# Patient Record
Sex: MALE | Race: WHITE | NOT HISPANIC OR LATINO | Employment: OTHER | URBAN - METROPOLITAN AREA
[De-identification: names, ages, dates, MRNs, and addresses within clinical notes are randomized per-mention and may not be internally consistent; named-entity substitution may affect disease eponyms.]

---

## 2017-06-20 ENCOUNTER — TRANSCRIBE ORDERS (OUTPATIENT)
Dept: ADMINISTRATIVE | Facility: HOSPITAL | Age: 62
End: 2017-06-20

## 2017-06-20 DIAGNOSIS — R68.89 EXERCISE INTOLERANCE: Primary | ICD-10-CM

## 2017-06-21 ENCOUNTER — LAB REQUISITION (OUTPATIENT)
Dept: LAB | Facility: HOSPITAL | Age: 62
End: 2017-06-21

## 2017-06-21 DIAGNOSIS — C43.59 MALIGNANT MELANOMA OF OTHER PART OF TRUNK (HCC): ICD-10-CM

## 2017-06-21 LAB — POTASSIUM BLDA-SCNC: 3.81 MMOL/L (ref 3.5–5.3)

## 2017-06-21 PROCEDURE — 88305 TISSUE EXAM BY PATHOLOGIST: CPT | Performed by: SURGERY

## 2017-06-21 PROCEDURE — 84132 ASSAY OF SERUM POTASSIUM: CPT | Performed by: SURGERY

## 2017-06-28 ENCOUNTER — APPOINTMENT (OUTPATIENT)
Dept: CARDIOLOGY | Facility: HOSPITAL | Age: 62
End: 2017-06-28

## 2017-06-29 ENCOUNTER — HOSPITAL ENCOUNTER (OUTPATIENT)
Dept: CARDIOLOGY | Facility: HOSPITAL | Age: 62
Discharge: HOME | End: 2017-06-29
Admitting: GENERAL PRACTICE

## 2017-06-29 VITALS
HEIGHT: 69 IN | HEART RATE: 85 BPM | WEIGHT: 198 LBS | BODY MASS INDEX: 29.33 KG/M2 | DIASTOLIC BLOOD PRESSURE: 80 MMHG | SYSTOLIC BLOOD PRESSURE: 158 MMHG | OXYGEN SATURATION: 97 %

## 2017-06-29 DIAGNOSIS — R68.89 EXERCISE INTOLERANCE: ICD-10-CM

## 2017-06-29 LAB
BH CV STRESS BP STAGE 1: NORMAL
BH CV STRESS DURATION MIN STAGE 1: 3
BH CV STRESS DURATION MIN STAGE 2: 2
BH CV STRESS DURATION SEC STAGE 1: 0
BH CV STRESS DURATION SEC STAGE 2: 0
BH CV STRESS GRADE STAGE 1: 10
BH CV STRESS GRADE STAGE 2: 12
BH CV STRESS HR STAGE 1: 121
BH CV STRESS HR STAGE 2: 137
BH CV STRESS METS STAGE 1: 5
BH CV STRESS METS STAGE 2: 7.5
BH CV STRESS O2 STAGE 1: 97
BH CV STRESS O2 STAGE 2: 97
BH CV STRESS PROTOCOL 1: NORMAL
BH CV STRESS RECOVERY BP: NORMAL MMHG
BH CV STRESS RECOVERY HR: 97 BPM
BH CV STRESS RECOVERY O2: 97 %
BH CV STRESS SPEED STAGE 1: 1.7
BH CV STRESS SPEED STAGE 2: 2.5
BH CV STRESS STAGE 1: 1
BH CV STRESS STAGE 2: 2
MAXIMAL PREDICTED HEART RATE: 160 BPM
PERCENT MAX PREDICTED HR: 85.63 %
STRESS BASELINE BP: NORMAL MMHG
STRESS BASELINE HR: 85 BPM
STRESS O2 SAT REST: 97 %
STRESS PERCENT HR: 101 %
STRESS POST ESTIMATED WORKLOAD: 7 METS
STRESS POST EXERCISE DUR MIN: 5 MIN
STRESS POST EXERCISE DUR SEC: 0 SEC
STRESS POST O2 SAT PEAK: 97 %
STRESS POST PEAK BP: NORMAL MMHG
STRESS POST PEAK HR: 137 BPM
STRESS TARGET HR: 136 BPM

## 2017-06-29 PROCEDURE — 93018 CV STRESS TEST I&R ONLY: CPT | Performed by: INTERNAL MEDICINE

## 2017-06-29 PROCEDURE — 93017 CV STRESS TEST TRACING ONLY: CPT

## 2017-06-29 RX ORDER — FLUOXETINE HYDROCHLORIDE 20 MG/1
20 CAPSULE ORAL DAILY
COMMUNITY
End: 2020-05-01

## 2017-06-29 RX ORDER — OXYCODONE AND ACETAMINOPHEN 10; 325 MG/1; MG/1
1 TABLET ORAL EVERY 6 HOURS PRN
COMMUNITY
End: 2017-10-02

## 2017-06-29 RX ORDER — DOXEPIN HYDROCHLORIDE 100 MG/1
150 CAPSULE ORAL NIGHTLY
COMMUNITY
End: 2017-07-12

## 2017-06-29 RX ORDER — GABAPENTIN 600 MG/1
600 TABLET ORAL 3 TIMES DAILY
COMMUNITY
End: 2020-05-01

## 2017-06-29 RX ORDER — TRIAMTERENE CAPSULES 50 MG/1
50 CAPSULE ORAL 2 TIMES DAILY
COMMUNITY
End: 2017-07-12

## 2017-06-29 RX ORDER — TIZANIDINE 4 MG/1
4 TABLET ORAL 3 TIMES DAILY
COMMUNITY
End: 2020-05-01

## 2017-06-30 LAB
CYTO UR: NORMAL
LAB AP CASE REPORT: NORMAL
LAB AP CLINICAL INFORMATION: NORMAL
LAB AP DIAGNOSIS COMMENT: NORMAL
Lab: NORMAL
PATH REPORT.FINAL DX SPEC: NORMAL
PATH REPORT.GROSS SPEC: NORMAL

## 2017-07-12 ENCOUNTER — CONSULT (OUTPATIENT)
Dept: CARDIOLOGY | Facility: CLINIC | Age: 62
End: 2017-07-12

## 2017-07-12 VITALS
WEIGHT: 199.6 LBS | BODY MASS INDEX: 29.56 KG/M2 | DIASTOLIC BLOOD PRESSURE: 90 MMHG | SYSTOLIC BLOOD PRESSURE: 140 MMHG | HEART RATE: 72 BPM | HEIGHT: 69 IN

## 2017-07-12 DIAGNOSIS — R00.2 PALPITATIONS: ICD-10-CM

## 2017-07-12 DIAGNOSIS — R55 SYNCOPE, UNSPECIFIED SYNCOPE TYPE: Primary | ICD-10-CM

## 2017-07-12 DIAGNOSIS — Z72.0 NICOTINE ABUSE: ICD-10-CM

## 2017-07-12 PROCEDURE — 99214 OFFICE O/P EST MOD 30 MIN: CPT | Performed by: INTERNAL MEDICINE

## 2017-07-12 PROCEDURE — 93000 ELECTROCARDIOGRAM COMPLETE: CPT | Performed by: INTERNAL MEDICINE

## 2017-07-12 RX ORDER — TRIAMTERENE AND HYDROCHLOROTHIAZIDE 37.5; 25 MG/1; MG/1
TABLET ORAL AS NEEDED
COMMUNITY
Start: 2017-06-28 | End: 2018-01-23 | Stop reason: SDUPTHER

## 2017-07-12 RX ORDER — TEMAZEPAM 30 MG/1
CAPSULE ORAL DAILY
COMMUNITY
Start: 2017-06-20 | End: 2020-05-01

## 2017-07-12 RX ORDER — DOXEPIN HYDROCHLORIDE 150 MG/1
CAPSULE ORAL DAILY
COMMUNITY
Start: 2017-05-04 | End: 2020-03-17

## 2017-07-12 RX ORDER — FLUTICASONE PROPIONATE 50 MCG
SPRAY, SUSPENSION (ML) NASAL AS NEEDED
COMMUNITY
Start: 2017-04-23 | End: 2020-05-01

## 2017-07-12 RX ORDER — BETAMETHASONE DIPROPIONATE 0.5 MG/ML
LOTION, AUGMENTED TOPICAL 2 TIMES DAILY PRN
COMMUNITY
Start: 2017-07-01 | End: 2020-05-01
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
## 2017-08-15 DIAGNOSIS — R55 SYNCOPE, UNSPECIFIED SYNCOPE TYPE: Primary | ICD-10-CM

## 2017-10-02 ENCOUNTER — OFFICE VISIT (OUTPATIENT)
Dept: CARDIOLOGY | Facility: CLINIC | Age: 62
End: 2017-10-02

## 2017-10-02 VITALS
BODY MASS INDEX: 28.85 KG/M2 | HEIGHT: 68 IN | SYSTOLIC BLOOD PRESSURE: 116 MMHG | HEART RATE: 85 BPM | WEIGHT: 190.4 LBS | DIASTOLIC BLOOD PRESSURE: 78 MMHG

## 2017-10-02 DIAGNOSIS — I47.1 SVT (SUPRAVENTRICULAR TACHYCARDIA) (HCC): ICD-10-CM

## 2017-10-02 DIAGNOSIS — R55 SYNCOPE, UNSPECIFIED SYNCOPE TYPE: Primary | ICD-10-CM

## 2017-10-02 DIAGNOSIS — Z72.0 NICOTINE ABUSE: ICD-10-CM

## 2017-10-02 PROBLEM — I47.10 SVT (SUPRAVENTRICULAR TACHYCARDIA): Status: ACTIVE | Noted: 2017-10-02

## 2017-10-02 PROCEDURE — 93000 ELECTROCARDIOGRAM COMPLETE: CPT | Performed by: INTERNAL MEDICINE

## 2017-10-02 PROCEDURE — 99243 OFF/OP CNSLTJ NEW/EST LOW 30: CPT | Performed by: INTERNAL MEDICINE

## 2017-10-02 RX ORDER — FLECAINIDE ACETATE 50 MG/1
50 TABLET ORAL 2 TIMES DAILY
Qty: 60 TABLET | Refills: 6 | Status: SHIPPED | OUTPATIENT
Start: 2017-10-02 | End: 2017-11-27 | Stop reason: SDUPTHER

## 2017-10-02 RX ORDER — HYDROCODONE BITARTRATE AND ACETAMINOPHEN 10; 325 MG/1; MG/1
0.5 TABLET ORAL EVERY 6 HOURS PRN
COMMUNITY
End: 2020-05-01
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
## 2017-10-04 ENCOUNTER — CLINICAL SUPPORT (OUTPATIENT)
Dept: CARDIOLOGY | Facility: CLINIC | Age: 62
End: 2017-10-04

## 2017-10-04 DIAGNOSIS — R55 SYNCOPE, UNSPECIFIED SYNCOPE TYPE: Primary | ICD-10-CM

## 2017-10-04 PROCEDURE — 93000 ELECTROCARDIOGRAM COMPLETE: CPT | Performed by: INTERNAL MEDICINE

## 2017-11-27 RX ORDER — FLECAINIDE ACETATE 50 MG/1
50 TABLET ORAL 2 TIMES DAILY
Qty: 60 TABLET | Refills: 6 | Status: SHIPPED | OUTPATIENT
Start: 2017-11-27 | End: 2018-01-08 | Stop reason: SDUPTHER

## 2018-01-08 RX ORDER — FLECAINIDE ACETATE 50 MG/1
50 TABLET ORAL EVERY 12 HOURS SCHEDULED
Qty: 60 TABLET | Refills: 6 | Status: SHIPPED | OUTPATIENT
Start: 2018-01-08 | End: 2018-01-23 | Stop reason: SDUPTHER

## 2018-01-23 RX ORDER — FLECAINIDE ACETATE 50 MG/1
50 TABLET ORAL EVERY 12 HOURS SCHEDULED
Qty: 60 TABLET | Refills: 6 | Status: SHIPPED | OUTPATIENT
Start: 2018-01-23 | End: 2018-12-11 | Stop reason: SDUPTHER

## 2018-01-23 RX ORDER — TRIAMTERENE AND HYDROCHLOROTHIAZIDE 37.5; 25 MG/1; MG/1
1 TABLET ORAL AS NEEDED
Qty: 30 TABLET | Refills: 6 | Status: SHIPPED | OUTPATIENT
Start: 2018-01-23 | End: 2021-12-16 | Stop reason: SDUPTHER

## 2018-12-11 RX ORDER — FLECAINIDE ACETATE 50 MG/1
50 TABLET ORAL EVERY 12 HOURS SCHEDULED
Qty: 60 TABLET | Refills: 6 | Status: SHIPPED | OUTPATIENT
Start: 2018-12-11 | End: 2019-01-10 | Stop reason: SDUPTHER

## 2019-01-10 RX ORDER — FLECAINIDE ACETATE 50 MG/1
50 TABLET ORAL EVERY 12 HOURS SCHEDULED
Qty: 60 TABLET | Refills: 6 | Status: SHIPPED | OUTPATIENT
Start: 2019-01-10 | End: 2020-05-01

## 2020-03-16 ENCOUNTER — TELEPHONE (OUTPATIENT)
Dept: CARDIOLOGY | Facility: CLINIC | Age: 65
End: 2020-03-16

## 2020-03-16 NOTE — TELEPHONE ENCOUNTER
Patient called requesting refills on Metoprolol and Flecainide. He has not been seen since 2017 by . I told him that he would have to schedule an appointment for refills.

## 2020-03-17 ENCOUNTER — HOSPITAL ENCOUNTER (EMERGENCY)
Facility: HOSPITAL | Age: 65
Discharge: HOME | End: 2020-03-17
Attending: EMERGENCY MEDICINE | Admitting: EMERGENCY MEDICINE

## 2020-03-17 ENCOUNTER — APPOINTMENT (OUTPATIENT)
Dept: GENERAL RADIOLOGY | Facility: HOSPITAL | Age: 65
End: 2020-03-17

## 2020-03-17 VITALS
TEMPERATURE: 98.1 F | DIASTOLIC BLOOD PRESSURE: 87 MMHG | BODY MASS INDEX: 19.99 KG/M2 | SYSTOLIC BLOOD PRESSURE: 139 MMHG | HEIGHT: 69 IN | WEIGHT: 135 LBS | RESPIRATION RATE: 18 BRPM | HEART RATE: 85 BPM | OXYGEN SATURATION: 97 %

## 2020-03-17 DIAGNOSIS — R00.2 PALPITATIONS: Primary | ICD-10-CM

## 2020-03-17 DIAGNOSIS — Z76.0 ENCOUNTER FOR MEDICATION REFILL: ICD-10-CM

## 2020-03-17 DIAGNOSIS — F41.0 ANXIETY ATTACK: ICD-10-CM

## 2020-03-17 DIAGNOSIS — I47.1 SVT (SUPRAVENTRICULAR TACHYCARDIA) (HCC): ICD-10-CM

## 2020-03-17 LAB
ALBUMIN SERPL-MCNC: 4.7 G/DL (ref 3.5–5.2)
ALBUMIN/GLOB SERPL: 1.3 G/DL
ALP SERPL-CCNC: 97 U/L (ref 39–117)
ALT SERPL W P-5'-P-CCNC: 30 U/L (ref 1–41)
ANION GAP SERPL CALCULATED.3IONS-SCNC: 14 MMOL/L (ref 5–15)
AST SERPL-CCNC: 44 U/L (ref 1–40)
BASOPHILS # BLD AUTO: 0.13 10*3/MM3 (ref 0–0.2)
BASOPHILS NFR BLD AUTO: 1.3 % (ref 0–1.5)
BILIRUB SERPL-MCNC: 0.3 MG/DL (ref 0.2–1.2)
BUN BLD-MCNC: 7 MG/DL (ref 8–23)
BUN/CREAT SERPL: 9 (ref 7–25)
CALCIUM SPEC-SCNC: 9.1 MG/DL (ref 8.6–10.5)
CHLORIDE SERPL-SCNC: 99 MMOL/L (ref 98–107)
CO2 SERPL-SCNC: 24 MMOL/L (ref 22–29)
CREAT BLD-MCNC: 0.78 MG/DL (ref 0.76–1.27)
DEPRECATED RDW RBC AUTO: 45.5 FL (ref 37–54)
EOSINOPHIL # BLD AUTO: 0.23 10*3/MM3 (ref 0–0.4)
EOSINOPHIL NFR BLD AUTO: 2.3 % (ref 0.3–6.2)
ERYTHROCYTE [DISTWIDTH] IN BLOOD BY AUTOMATED COUNT: 12.7 % (ref 12.3–15.4)
GFR SERPL CREATININE-BSD FRML MDRD: 101 ML/MIN/1.73
GLOBULIN UR ELPH-MCNC: 3.6 GM/DL
GLUCOSE BLD-MCNC: 144 MG/DL (ref 65–99)
HCT VFR BLD AUTO: 46.9 % (ref 37.5–51)
HGB BLD-MCNC: 15.7 G/DL (ref 13–17.7)
HOLD SPECIMEN: NORMAL
HOLD SPECIMEN: NORMAL
IMM GRANULOCYTES # BLD AUTO: 0.04 10*3/MM3 (ref 0–0.05)
IMM GRANULOCYTES NFR BLD AUTO: 0.4 % (ref 0–0.5)
LYMPHOCYTES # BLD AUTO: 2.84 10*3/MM3 (ref 0.7–3.1)
LYMPHOCYTES NFR BLD AUTO: 28.1 % (ref 19.6–45.3)
MAGNESIUM SERPL-MCNC: 2 MG/DL (ref 1.6–2.4)
MCH RBC QN AUTO: 32.8 PG (ref 26.6–33)
MCHC RBC AUTO-ENTMCNC: 33.5 G/DL (ref 31.5–35.7)
MCV RBC AUTO: 97.9 FL (ref 79–97)
MONOCYTES # BLD AUTO: 0.89 10*3/MM3 (ref 0.1–0.9)
MONOCYTES NFR BLD AUTO: 8.8 % (ref 5–12)
NEUTROPHILS # BLD AUTO: 5.99 10*3/MM3 (ref 1.7–7)
NEUTROPHILS NFR BLD AUTO: 59.1 % (ref 42.7–76)
NRBC BLD AUTO-RTO: 0 /100 WBC (ref 0–0.2)
NT-PROBNP SERPL-MCNC: 172.5 PG/ML (ref 5–900)
PLATELET # BLD AUTO: 227 10*3/MM3 (ref 140–450)
PMV BLD AUTO: 9.9 FL (ref 6–12)
POTASSIUM BLD-SCNC: 3.4 MMOL/L (ref 3.5–5.2)
PROT SERPL-MCNC: 8.3 G/DL (ref 6–8.5)
RBC # BLD AUTO: 4.79 10*6/MM3 (ref 4.14–5.8)
SODIUM BLD-SCNC: 137 MMOL/L (ref 136–145)
TROPONIN T SERPL-MCNC: <0.01 NG/ML (ref 0–0.03)
TSH SERPL DL<=0.05 MIU/L-ACNC: 0.32 UIU/ML (ref 0.27–4.2)
WBC NRBC COR # BLD: 10.12 10*3/MM3 (ref 3.4–10.8)
WHOLE BLOOD HOLD SPECIMEN: NORMAL
WHOLE BLOOD HOLD SPECIMEN: NORMAL

## 2020-03-17 PROCEDURE — 80053 COMPREHEN METABOLIC PANEL: CPT | Performed by: EMERGENCY MEDICINE

## 2020-03-17 PROCEDURE — 83880 ASSAY OF NATRIURETIC PEPTIDE: CPT | Performed by: EMERGENCY MEDICINE

## 2020-03-17 PROCEDURE — 84484 ASSAY OF TROPONIN QUANT: CPT | Performed by: EMERGENCY MEDICINE

## 2020-03-17 PROCEDURE — 84443 ASSAY THYROID STIM HORMONE: CPT | Performed by: EMERGENCY MEDICINE

## 2020-03-17 PROCEDURE — 83735 ASSAY OF MAGNESIUM: CPT | Performed by: EMERGENCY MEDICINE

## 2020-03-17 PROCEDURE — 93005 ELECTROCARDIOGRAM TRACING: CPT

## 2020-03-17 PROCEDURE — 71045 X-RAY EXAM CHEST 1 VIEW: CPT

## 2020-03-17 PROCEDURE — 93005 ELECTROCARDIOGRAM TRACING: CPT | Performed by: EMERGENCY MEDICINE

## 2020-03-17 PROCEDURE — 99284 EMERGENCY DEPT VISIT MOD MDM: CPT

## 2020-03-17 PROCEDURE — 85025 COMPLETE CBC W/AUTO DIFF WBC: CPT | Performed by: EMERGENCY MEDICINE

## 2020-03-17 RX ORDER — SODIUM CHLORIDE 0.9 % (FLUSH) 0.9 %
10 SYRINGE (ML) INJECTION AS NEEDED
Status: DISCONTINUED | OUTPATIENT
Start: 2020-03-17 | End: 2020-03-17 | Stop reason: HOSPADM

## 2020-03-17 RX ORDER — HYDROXYZINE PAMOATE 50 MG/1
50 CAPSULE ORAL NIGHTLY PRN
Qty: 30 CAPSULE | Refills: 0 | Status: SHIPPED | OUTPATIENT
Start: 2020-03-17 | End: 2020-05-01

## 2020-03-17 NOTE — ED PROVIDER NOTES
"Subjective   Stepan Gaines is a 64 y.o.male who presents to the emergency department with complaints of rapid heart rate. The patient confirms that he has SVT (\"2-3 times a week since ") and multiple anxiety attacks (not diagnosed with anxiety attacks). His accompanying symptoms include sleep disturbance and appetite change. He denies shortness of breath or cough. The patient notes that his wife  in February and has \"been down\" since then. He has stopped taking his hypertension medications because \"I stopped going to the office\". He usually takes Flecainide and Metoprolol. He additionally notes that he has a history of taking Doxepin. The patient denies a history of coronary artery disease, but confirms a history of COPD. He notes that he was smoking one pack of cigarettes a day, but has cut down to two cigarettes a day. In addition to the hypertension and COPD, his past medical history includes melanoma and arthritis. His past surgical history includes arthroscopy of right knee, left elbow, and right shoulder. He does smoke and does not drink alcohol. The patient does not have any additional acute complaints at this time.      History provided by:  Patient  Palpitations   Palpitations quality:  Fast  Onset quality:  Sudden  Duration: \"This morning\"  Timing:  Constant  Progression:  Unchanged  Chronicity:  Recurrent  Context: anxiety    Associated symptoms: no cough and no shortness of breath        Review of Systems   Constitutional: Positive for appetite change.   Respiratory: Negative for cough and shortness of breath.    Cardiovascular: Positive for palpitations.   Psychiatric/Behavioral: Positive for sleep disturbance. The patient is nervous/anxious.    All other systems reviewed and are negative.      Past Medical History:   Diagnosis Date   • Anxiety    • Arthritis    • Cancer (CMS/HCC)     melanoma back   • COPD (chronic obstructive pulmonary disease) (CMS/HCC)    • Fainting    • " Hypertension        No Known Allergies    Past Surgical History:   Procedure Laterality Date   • CATARACT EXTRACTION      right eye   • ELBOW ARTHROSCOPY Left     left elbow twice   • KNEE ARTHROSCOPY Right    • SHOULDER ARTHROSCOPY Right     left and right but had left shoulder surgery twice   • TOE ARTHROPLASTY Right        Family History   Problem Relation Age of Onset   • Hyperlipidemia Mother    • Lung cancer Father    • Diabetes Father    • Kidney failure Father        Social History     Socioeconomic History   • Marital status:      Spouse name: Not on file   • Number of children: Not on file   • Years of education: Not on file   • Highest education level: Not on file   Tobacco Use   • Smoking status: Current Every Day Smoker     Packs/day: 1.00   • Smokeless tobacco: Never Used   Substance and Sexual Activity   • Alcohol use: No   • Drug use: No   • Sexual activity: Defer         Objective   Physical Exam   Constitutional: He is oriented to person, place, and time. He appears well-developed and well-nourished. No distress.   HENT:   Head: Normocephalic and atraumatic.   Eyes: Conjunctivae are normal. No scleral icterus.   Neck: Normal range of motion. Neck supple.   Cardiovascular: Regular rhythm and normal heart sounds. Tachycardia present.   No murmur heard.  Heart rate is borderline tachycardic. (90s)   Pulmonary/Chest: Effort normal and breath sounds normal. No respiratory distress.   Abdominal: Soft. Bowel sounds are normal. There is no tenderness. There is no rebound and no guarding.   Musculoskeletal: Normal range of motion. He exhibits no edema.   Neurological: He is alert and oriented to person, place, and time.   Skin: Skin is warm and dry.   Psychiatric: He has a normal mood and affect. His behavior is normal.   Nursing note and vitals reviewed.      Procedures         ED Course     Recent Results (from the past 24 hour(s))   Comprehensive Metabolic Panel    Collection Time: 03/17/20 11:21  AM   Result Value Ref Range    Glucose 144 (H) 65 - 99 mg/dL    BUN 7 (L) 8 - 23 mg/dL    Creatinine 0.78 0.76 - 1.27 mg/dL    Sodium 137 136 - 145 mmol/L    Potassium 3.4 (L) 3.5 - 5.2 mmol/L    Chloride 99 98 - 107 mmol/L    CO2 24.0 22.0 - 29.0 mmol/L    Calcium 9.1 8.6 - 10.5 mg/dL    Total Protein 8.3 6.0 - 8.5 g/dL    Albumin 4.70 3.50 - 5.20 g/dL    ALT (SGPT) 30 1 - 41 U/L    AST (SGOT) 44 (H) 1 - 40 U/L    Alkaline Phosphatase 97 39 - 117 U/L    Total Bilirubin 0.3 0.2 - 1.2 mg/dL    eGFR Non African Amer 101 >60 mL/min/1.73    Globulin 3.6 gm/dL    A/G Ratio 1.3 g/dL    BUN/Creatinine Ratio 9.0 7.0 - 25.0    Anion Gap 14.0 5.0 - 15.0 mmol/L   Magnesium    Collection Time: 03/17/20 11:21 AM   Result Value Ref Range    Magnesium 2.0 1.6 - 2.4 mg/dL   Troponin    Collection Time: 03/17/20 11:21 AM   Result Value Ref Range    Troponin T <0.010 0.000 - 0.030 ng/mL   TSH    Collection Time: 03/17/20 11:21 AM   Result Value Ref Range    TSH 0.315 0.270 - 4.200 uIU/mL   BNP    Collection Time: 03/17/20 11:21 AM   Result Value Ref Range    proBNP 172.5 5.0 - 900.0 pg/mL   Light Blue Top    Collection Time: 03/17/20 11:21 AM   Result Value Ref Range    Extra Tube hold for add-on    Green Top (Gel)    Collection Time: 03/17/20 11:21 AM   Result Value Ref Range    Extra Tube Hold for add-ons.    Lavender Top    Collection Time: 03/17/20 11:21 AM   Result Value Ref Range    Extra Tube hold for add-on    Gold Top - SST    Collection Time: 03/17/20 11:21 AM   Result Value Ref Range    Extra Tube Hold for add-ons.    CBC Auto Differential    Collection Time: 03/17/20 11:21 AM   Result Value Ref Range    WBC 10.12 3.40 - 10.80 10*3/mm3    RBC 4.79 4.14 - 5.80 10*6/mm3    Hemoglobin 15.7 13.0 - 17.7 g/dL    Hematocrit 46.9 37.5 - 51.0 %    MCV 97.9 (H) 79.0 - 97.0 fL    MCH 32.8 26.6 - 33.0 pg    MCHC 33.5 31.5 - 35.7 g/dL    RDW 12.7 12.3 - 15.4 %    RDW-SD 45.5 37.0 - 54.0 fl    MPV 9.9 6.0 - 12.0 fL    Platelets 227 140  "- 450 10*3/mm3    Neutrophil % 59.1 42.7 - 76.0 %    Lymphocyte % 28.1 19.6 - 45.3 %    Monocyte % 8.8 5.0 - 12.0 %    Eosinophil % 2.3 0.3 - 6.2 %    Basophil % 1.3 0.0 - 1.5 %    Immature Grans % 0.4 0.0 - 0.5 %    Neutrophils, Absolute 5.99 1.70 - 7.00 10*3/mm3    Lymphocytes, Absolute 2.84 0.70 - 3.10 10*3/mm3    Monocytes, Absolute 0.89 0.10 - 0.90 10*3/mm3    Eosinophils, Absolute 0.23 0.00 - 0.40 10*3/mm3    Basophils, Absolute 0.13 0.00 - 0.20 10*3/mm3    Immature Grans, Absolute 0.04 0.00 - 0.05 10*3/mm3    nRBC 0.0 0.0 - 0.2 /100 WBC     Note: In addition to lab results from this visit, the labs listed above may include labs taken at another facility or during a different encounter within the last 24 hours. Please correlate lab times with ED admission and discharge times for further clarification of the services performed during this visit.    XR Chest 1 View   Final Result   Chronic changes seen within the lung fields bilaterally with   no evidence of acute parenchymal disease.       D:  03/17/2020   E:  03/17/2020       This report was finalized on 3/17/2020 12:33 PM by Dr. Sobia Villavicencio MD.            Vitals:    03/17/20 1118 03/17/20 1202 03/17/20 1203 03/17/20 1230   BP: 132/92 150/93  139/87   Pulse:   94 85   Resp:       Temp:       SpO2:   93% 97%   Weight: 61.2 kg (135 lb)      Height: 175.3 cm (69\")        Medications - No data to display  ECG/EMG Results (last 24 hours)     ** No results found for the last 24 hours. **        ECG 12 Lead    (Results Pending)                                              MDM  Number of Diagnoses or Management Options  Anxiety attack: new and requires workup  Encounter for medication refill: new and requires workup  Palpitations: new and requires workup  SVT (supraventricular tachycardia) (CMS/HCC): new and requires workup     Amount and/or Complexity of Data Reviewed  Clinical lab tests: reviewed and ordered  Tests in the radiology section of CPT®: reviewed " and ordered  Tests in the medicine section of CPT®: reviewed and ordered  Discuss the patient with other providers: yes    Patient Progress  Patient progress: stable      Final diagnoses:   Palpitations   SVT (supraventricular tachycardia) (CMS/HCC)   Anxiety attack   Encounter for medication refill       Documentation assistance provided by nimesh Grant.  Information recorded by the nimesh was done at my direction and has been verified and validated by me.     Hugo Grant  03/17/20 4505       Tanvir Rios PA  03/17/20 0130

## 2020-03-18 ENCOUNTER — TELEPHONE (OUTPATIENT)
Dept: CARDIOLOGY | Facility: HOSPITAL | Age: 65
End: 2020-03-18

## 2020-03-18 NOTE — TELEPHONE ENCOUNTER
Patient was referred to Heart and Vascular by [facility]. I have attempt to contact the patient 3 time with no success.Paatient was sent a letter to call the office to schedule.

## 2020-05-01 ENCOUNTER — TELEMEDICINE (OUTPATIENT)
Dept: CARDIOLOGY | Facility: HOSPITAL | Age: 65
End: 2020-05-01

## 2020-05-01 VITALS
BODY MASS INDEX: 20.73 KG/M2 | HEART RATE: 90 BPM | SYSTOLIC BLOOD PRESSURE: 122 MMHG | OXYGEN SATURATION: 97 % | DIASTOLIC BLOOD PRESSURE: 78 MMHG | WEIGHT: 140 LBS | HEIGHT: 69 IN

## 2020-05-01 DIAGNOSIS — I10 ESSENTIAL HYPERTENSION: ICD-10-CM

## 2020-05-01 DIAGNOSIS — I47.1 SVT (SUPRAVENTRICULAR TACHYCARDIA) (HCC): Primary | ICD-10-CM

## 2020-05-01 DIAGNOSIS — F41.9 ANXIETY: ICD-10-CM

## 2020-05-01 PROCEDURE — 99213 OFFICE O/P EST LOW 20 MIN: CPT | Performed by: NURSE PRACTITIONER

## 2020-05-12 ENCOUNTER — TELEPHONE (OUTPATIENT)
Dept: CARDIOLOGY | Facility: HOSPITAL | Age: 65
End: 2020-05-12

## 2020-05-12 ENCOUNTER — HOSPITAL ENCOUNTER (OUTPATIENT)
Dept: CARDIOLOGY | Facility: HOSPITAL | Age: 65
Discharge: HOME | End: 2020-05-12

## 2020-05-12 DIAGNOSIS — I47.1 SVT (SUPRAVENTRICULAR TACHYCARDIA) (HCC): Primary | ICD-10-CM

## 2020-05-12 DIAGNOSIS — R00.2 PALPITATIONS: ICD-10-CM

## 2020-05-12 DIAGNOSIS — I47.1 SVT (SUPRAVENTRICULAR TACHYCARDIA) (HCC): ICD-10-CM

## 2020-05-12 NOTE — TELEPHONE ENCOUNTER
"Called patient and he reports that he is taking metoprolol 25mg BID, most of the HRs are 70s-80s. He has taken an extra metoprolol a few days due to elevated HR. He does admit to increased stress. He BP monitor is showing an irregular heart beat. Currently his BP is 138/98 and HR is 79 and BP monitor says he is \"irregular\". He does note some fatigue and increased stress. Advised to increase metoprolol to 50mg BID and will send event monitor to make sure he is not having afib. If symptoms persist, he will need to come in for EKG prior to resuming flecainide. Referral to EP is pending  "

## 2020-05-12 NOTE — TELEPHONE ENCOUNTER
Patient is calling regarding metoprolol he states that most of the time it works, but on today his pulse is 111 after taking it this morning. He states he is still having some issues wit it not working for him.  He would Like to talk you about about flecainide.

## 2020-05-18 ENCOUNTER — TELEPHONE (OUTPATIENT)
Dept: CARDIOLOGY | Facility: HOSPITAL | Age: 65
End: 2020-05-18

## 2020-05-18 ENCOUNTER — HOSPITAL ENCOUNTER (EMERGENCY)
Facility: HOSPITAL | Age: 65
Discharge: HOME | End: 2020-05-18
Attending: EMERGENCY MEDICINE | Admitting: EMERGENCY MEDICINE

## 2020-05-18 ENCOUNTER — APPOINTMENT (OUTPATIENT)
Dept: GENERAL RADIOLOGY | Facility: HOSPITAL | Age: 65
End: 2020-05-18

## 2020-05-18 VITALS
SYSTOLIC BLOOD PRESSURE: 133 MMHG | HEIGHT: 68 IN | WEIGHT: 140 LBS | RESPIRATION RATE: 18 BRPM | TEMPERATURE: 98 F | HEART RATE: 61 BPM | OXYGEN SATURATION: 97 % | DIASTOLIC BLOOD PRESSURE: 101 MMHG | BODY MASS INDEX: 21.22 KG/M2

## 2020-05-18 DIAGNOSIS — R00.2 PALPITATIONS: ICD-10-CM

## 2020-05-18 DIAGNOSIS — I48.92 ATRIAL FLUTTER, UNSPECIFIED TYPE (HCC): Primary | ICD-10-CM

## 2020-05-18 LAB
ALBUMIN SERPL-MCNC: 4.5 G/DL (ref 3.5–5.2)
ALBUMIN/GLOB SERPL: 1.5 G/DL
ALP SERPL-CCNC: 85 U/L (ref 39–117)
ALT SERPL W P-5'-P-CCNC: 29 U/L (ref 1–41)
ANION GAP SERPL CALCULATED.3IONS-SCNC: 15 MMOL/L (ref 5–15)
AST SERPL-CCNC: 52 U/L (ref 1–40)
BASOPHILS # BLD AUTO: 0.08 10*3/MM3 (ref 0–0.2)
BASOPHILS NFR BLD AUTO: 1.1 % (ref 0–1.5)
BILIRUB SERPL-MCNC: 0.4 MG/DL (ref 0.2–1.2)
BUN BLD-MCNC: 6 MG/DL (ref 8–23)
BUN/CREAT SERPL: 7.3 (ref 7–25)
CALCIUM SPEC-SCNC: 8.6 MG/DL (ref 8.6–10.5)
CHLORIDE SERPL-SCNC: 97 MMOL/L (ref 98–107)
CO2 SERPL-SCNC: 20 MMOL/L (ref 22–29)
CREAT BLD-MCNC: 0.82 MG/DL (ref 0.76–1.27)
DEPRECATED RDW RBC AUTO: 44.7 FL (ref 37–54)
EOSINOPHIL # BLD AUTO: 0.35 10*3/MM3 (ref 0–0.4)
EOSINOPHIL NFR BLD AUTO: 4.7 % (ref 0.3–6.2)
ERYTHROCYTE [DISTWIDTH] IN BLOOD BY AUTOMATED COUNT: 12.4 % (ref 12.3–15.4)
GFR SERPL CREATININE-BSD FRML MDRD: 95 ML/MIN/1.73
GLOBULIN UR ELPH-MCNC: 3.1 GM/DL
GLUCOSE BLD-MCNC: 115 MG/DL (ref 65–99)
HCT VFR BLD AUTO: 41.5 % (ref 37.5–51)
HGB BLD-MCNC: 14.4 G/DL (ref 13–17.7)
HOLD SPECIMEN: NORMAL
HOLD SPECIMEN: NORMAL
IMM GRANULOCYTES # BLD AUTO: 0.02 10*3/MM3 (ref 0–0.05)
IMM GRANULOCYTES NFR BLD AUTO: 0.3 % (ref 0–0.5)
LYMPHOCYTES # BLD AUTO: 3.5 10*3/MM3 (ref 0.7–3.1)
LYMPHOCYTES NFR BLD AUTO: 46.6 % (ref 19.6–45.3)
MAGNESIUM SERPL-MCNC: 2.1 MG/DL (ref 1.6–2.4)
MCH RBC QN AUTO: 33.6 PG (ref 26.6–33)
MCHC RBC AUTO-ENTMCNC: 34.7 G/DL (ref 31.5–35.7)
MCV RBC AUTO: 96.7 FL (ref 79–97)
MONOCYTES # BLD AUTO: 0.99 10*3/MM3 (ref 0.1–0.9)
MONOCYTES NFR BLD AUTO: 13.2 % (ref 5–12)
NEUTROPHILS # BLD AUTO: 2.57 10*3/MM3 (ref 1.7–7)
NEUTROPHILS NFR BLD AUTO: 34.1 % (ref 42.7–76)
NRBC BLD AUTO-RTO: 0 /100 WBC (ref 0–0.2)
NT-PROBNP SERPL-MCNC: 291.8 PG/ML (ref 5–900)
PLATELET # BLD AUTO: 161 10*3/MM3 (ref 140–450)
PMV BLD AUTO: 9.4 FL (ref 6–12)
POTASSIUM BLD-SCNC: 3.5 MMOL/L (ref 3.5–5.2)
PROT SERPL-MCNC: 7.6 G/DL (ref 6–8.5)
RBC # BLD AUTO: 4.29 10*6/MM3 (ref 4.14–5.8)
SODIUM BLD-SCNC: 132 MMOL/L (ref 136–145)
TROPONIN T SERPL-MCNC: <0.01 NG/ML (ref 0–0.03)
TSH SERPL DL<=0.05 MIU/L-ACNC: 0.96 UIU/ML (ref 0.27–4.2)
WBC NRBC COR # BLD: 7.51 10*3/MM3 (ref 3.4–10.8)
WHOLE BLOOD HOLD SPECIMEN: NORMAL
WHOLE BLOOD HOLD SPECIMEN: NORMAL

## 2020-05-18 PROCEDURE — 99284 EMERGENCY DEPT VISIT MOD MDM: CPT

## 2020-05-18 PROCEDURE — 83735 ASSAY OF MAGNESIUM: CPT | Performed by: EMERGENCY MEDICINE

## 2020-05-18 PROCEDURE — 71045 X-RAY EXAM CHEST 1 VIEW: CPT

## 2020-05-18 PROCEDURE — 93005 ELECTROCARDIOGRAM TRACING: CPT | Performed by: EMERGENCY MEDICINE

## 2020-05-18 PROCEDURE — 84443 ASSAY THYROID STIM HORMONE: CPT | Performed by: EMERGENCY MEDICINE

## 2020-05-18 PROCEDURE — 83880 ASSAY OF NATRIURETIC PEPTIDE: CPT | Performed by: EMERGENCY MEDICINE

## 2020-05-18 PROCEDURE — 80053 COMPREHEN METABOLIC PANEL: CPT | Performed by: EMERGENCY MEDICINE

## 2020-05-18 PROCEDURE — 85025 COMPLETE CBC W/AUTO DIFF WBC: CPT | Performed by: EMERGENCY MEDICINE

## 2020-05-18 PROCEDURE — 84484 ASSAY OF TROPONIN QUANT: CPT | Performed by: EMERGENCY MEDICINE

## 2020-05-18 RX ORDER — SODIUM CHLORIDE 0.9 % (FLUSH) 0.9 %
10 SYRINGE (ML) INJECTION AS NEEDED
Status: DISCONTINUED | OUTPATIENT
Start: 2020-05-18 | End: 2020-05-18 | Stop reason: HOSPADM

## 2020-05-18 NOTE — DISCHARGE INSTRUCTIONS
Avoid all stimulants to include decongestants which can be found in over-the-counter medications.    Avoid caffeine and other stimulants.    If you find that you have a sustained increase in your heart rate after taking your metoprolol dose, please repeat that dose.  Do not take more than 3 total doses in 1 day.    Please review the medications you are supposed to be taking according to prior physician recommendations. I have not changed your home medications during this visit. If your discharge instructions indicate that I have changed your home medications, this is not the case, and you should disregard. If you have any questions about the medication you should be taking at home, please call your physician.

## 2020-05-18 NOTE — TELEPHONE ENCOUNTER
Patient called and stated he is coming to [facility] ER he stated he is not feeling right. He just wanted to let us know

## 2020-05-18 NOTE — ED PROVIDER NOTES
Subjective   Stepan Gaines is a 64 y.o. male who presents to the ED with c/o palpitations. The patient reports his original cardiologist was Dr. Hernandez years ago but switched to Dr. Reilly who saw him 3 years ago. After wearing a heart monitor he was informed that he had SVT and started taking 12.5 mg of Metoprolol twice daily along with his 50 mg of Flecainide twice daily. The patient has since stopped taking the Flecainide medication and his Metoprolol was increased to 50 mg twice daily approximately 1 month ago. He denies missing any dosages in the past week. The patient recently was located in [place] for his occupation and has not seen a cardiologist recently due to this situation. He began experiencing irregular palpitations yesterday but it was not severe enough to prompt him to seek medical attention. The patient reports waking up this morning at 0500 with palpitations, shortness of breath, and a heart rate of 155 beats per minute. He took his Metoprolol dosage at 0530 but it did not alleviate his symptoms. The patient currently feels like he is out of rhythm. He complains of decreased sleep, bleeding easily, and increased stress in his life but denies chest pain, fever, chills, and loss of taste or smell. He is currently taking a Zyrtec decongestant stimulant but he is not anticoagulated. The patient denies exposure to suspected or confirmed COVID-19 patiens. He is a current everyday smoker with approximately 0.5 packs of cigarettes. The patient is currently wearing a [employer] Mini heart monitor that is black with teal-colored spots. He had a cardiac stress test 3 years ago that was normal. The patient has a past medical history of hypertension, COPD, melanoma of the back, anxiety, and arthritis. His surgical history includes right knee arthroscopy, left elbow arthroscopy, right shoulder arthroscopy, and right toe arthroplasty. There are no other acute complaints at this time.      History provided  by:  Patient  Palpitations   Palpitations quality:  Irregular  Onset quality:  Sudden  Duration:  1 day  Timing:  Constant  Progression:  Worsening  Chronicity:  New  Context: stimulant use    Relieved by:  Nothing  Worsened by:  Nothing  Ineffective treatments:  Breathing exercises (Metoprolol)  Associated symptoms: shortness of breath    Associated symptoms: no chest pain and no cough    Risk factors: OTC sinus medications and stress    Risk factors: no diabetes mellitus, no hx of DVT and no hx of PE        Review of Systems   Constitutional: Negative for chills and fever.   HENT:        The patient denies loss of taste or smell.   Respiratory: Positive for shortness of breath. Negative for cough.         He complains of shortness of breath during palpitations episodes.   Cardiovascular: Positive for palpitations. Negative for chest pain.   Hematological: Bruises/bleeds easily.        The patient complains of bleeding easily.   Psychiatric/Behavioral: Positive for sleep disturbance.        The patient complains of decreased sleep and recent stress in his life.   All other systems reviewed and are negative.      Past Medical History:   Diagnosis Date   • Anxiety    • Arthritis    • Cancer (CMS/HCC)     melanoma back   • COPD (chronic obstructive pulmonary disease) (CMS/HCC)    • Fainting    • Hypertension        No Known Allergies    Past Surgical History:   Procedure Laterality Date   • CATARACT EXTRACTION      right eye   • ELBOW ARTHROSCOPY Left     left elbow twice   • KNEE ARTHROSCOPY Right    • SHOULDER ARTHROSCOPY Right     left and right but had left shoulder surgery twice   • TOE ARTHROPLASTY Right        Family History   Problem Relation Age of Onset   • Hyperlipidemia Mother    • Lung cancer Father    • Diabetes Father    • Kidney failure Father        Social History     Socioeconomic History   • Marital status:      Spouse name: Not on file   • Number of children: Not on file   • Years of  education: Not on file   • Highest education level: Not on file   Tobacco Use   • Smoking status: Current Every Day Smoker     Packs/day: 1.00   • Smokeless tobacco: Never Used   Substance and Sexual Activity   • Alcohol use: No   • Drug use: No   • Sexual activity: Defer   Social History Narrative    caffeine use: 1-2 servings of coffee in the am occasionally coke          Objective   Physical Exam   Constitutional: He is oriented to person, place, and time. He appears well-developed and well-nourished.  Non-toxic appearance. No distress.   The patient is pleasant, thin, non-toxic appearing, and in no distress.   HENT:   Head: Normocephalic and atraumatic.   Eyes: Conjunctivae are normal. No scleral icterus.   Neck: Normal range of motion. Neck supple.   Cardiovascular: Normal rate and normal heart sounds. An irregular rhythm present.   No murmur heard.  Pulses:       Radial pulses are 2+ on the right side, and 2+ on the left side.   Somewhat irregular rhythm.   Pulmonary/Chest: Effort normal and breath sounds normal. No respiratory distress.   Abdominal: Soft. There is no tenderness.   Musculoskeletal: Normal range of motion. He exhibits no edema.   No pretibial edema to the bilateral lower extremities.   Neurological: He is alert and oriented to person, place, and time.   Skin: Skin is warm and dry.   Psychiatric: His behavior is normal. His mood appears anxious.   Mildly anxious.   Nursing note and vitals reviewed.      Procedures         ED Course  ED Course as of May 19 0047   Mon May 18, 2020   1656 Dr. Ibrahim has paged Dr. Corado, cardiology.    [BS]   1955 I spoke with Dr. Vidal, on-call cardiology who recommends close outpatient follow-up.  I spoke with the patient in detail about need to start anticoagulation with Eliquis as well as follow other discharge instructions.  He is quite comfortable with this.  He is extremely anxious to be discharged soon as possible.  We will comply.    [MS]      ED  Course User Index  [BS] Taras Galindo  [MS] Travis Ibrahim MD     Recent Results (from the past 24 hour(s))   Comprehensive Metabolic Panel    Collection Time: 05/18/20  4:23 PM   Result Value Ref Range    Glucose 115 (H) 65 - 99 mg/dL    BUN 6 (L) 8 - 23 mg/dL    Creatinine 0.82 0.76 - 1.27 mg/dL    Sodium 132 (L) 136 - 145 mmol/L    Potassium 3.5 3.5 - 5.2 mmol/L    Chloride 97 (L) 98 - 107 mmol/L    CO2 20.0 (L) 22.0 - 29.0 mmol/L    Calcium 8.6 8.6 - 10.5 mg/dL    Total Protein 7.6 6.0 - 8.5 g/dL    Albumin 4.50 3.50 - 5.20 g/dL    ALT (SGPT) 29 1 - 41 U/L    AST (SGOT) 52 (H) 1 - 40 U/L    Alkaline Phosphatase 85 39 - 117 U/L    Total Bilirubin 0.4 0.2 - 1.2 mg/dL    eGFR Non African Amer 95 >60 mL/min/1.73    Globulin 3.1 gm/dL    A/G Ratio 1.5 g/dL    BUN/Creatinine Ratio 7.3 7.0 - 25.0    Anion Gap 15.0 5.0 - 15.0 mmol/L   Magnesium    Collection Time: 05/18/20  4:23 PM   Result Value Ref Range    Magnesium 2.1 1.6 - 2.4 mg/dL   Troponin    Collection Time: 05/18/20  4:23 PM   Result Value Ref Range    Troponin T <0.010 0.000 - 0.030 ng/mL   TSH    Collection Time: 05/18/20  4:23 PM   Result Value Ref Range    TSH 0.959 0.270 - 4.200 uIU/mL   BNP    Collection Time: 05/18/20  4:23 PM   Result Value Ref Range    proBNP 291.8 5.0 - 900.0 pg/mL   Light Blue Top    Collection Time: 05/18/20  4:23 PM   Result Value Ref Range    Extra Tube hold for add-on    Green Top (Gel)    Collection Time: 05/18/20  4:23 PM   Result Value Ref Range    Extra Tube Hold for add-ons.    Lavender Top    Collection Time: 05/18/20  4:23 PM   Result Value Ref Range    Extra Tube hold for add-on    Gold Top - SST    Collection Time: 05/18/20  4:23 PM   Result Value Ref Range    Extra Tube Hold for add-ons.    CBC Auto Differential    Collection Time: 05/18/20  4:23 PM   Result Value Ref Range    WBC 7.51 3.40 - 10.80 10*3/mm3    RBC 4.29 4.14 - 5.80 10*6/mm3    Hemoglobin 14.4 13.0 - 17.7 g/dL    Hematocrit 41.5 37.5 - 51.0 %    MCV  96.7 79.0 - 97.0 fL    MCH 33.6 (H) 26.6 - 33.0 pg    MCHC 34.7 31.5 - 35.7 g/dL    RDW 12.4 12.3 - 15.4 %    RDW-SD 44.7 37.0 - 54.0 fl    MPV 9.4 6.0 - 12.0 fL    Platelets 161 140 - 450 10*3/mm3    Neutrophil % 34.1 (L) 42.7 - 76.0 %    Lymphocyte % 46.6 (H) 19.6 - 45.3 %    Monocyte % 13.2 (H) 5.0 - 12.0 %    Eosinophil % 4.7 0.3 - 6.2 %    Basophil % 1.1 0.0 - 1.5 %    Immature Grans % 0.3 0.0 - 0.5 %    Neutrophils, Absolute 2.57 1.70 - 7.00 10*3/mm3    Lymphocytes, Absolute 3.50 (H) 0.70 - 3.10 10*3/mm3    Monocytes, Absolute 0.99 (H) 0.10 - 0.90 10*3/mm3    Eosinophils, Absolute 0.35 0.00 - 0.40 10*3/mm3    Basophils, Absolute 0.08 0.00 - 0.20 10*3/mm3    Immature Grans, Absolute 0.02 0.00 - 0.05 10*3/mm3    nRBC 0.0 0.0 - 0.2 /100 WBC     Note: In addition to lab results from this visit, the labs listed above may include labs taken at another facility or during a different encounter within the last 24 hours. Please correlate lab times with ED admission and discharge times for further clarification of the services performed during this visit.    XR Chest 1 View   Preliminary Result   No acute cardiopulmonary process. Electronic device   rectangular in shape overlies the upper mediastinum and trachea,   indeterminate location, but likely extrathoracic or outside of the   patient ultimately indeterminate on the single view.       D:  05/18/2020   E:  05/18/2020                Vitals:    05/18/20 1800 05/18/20 1851 05/18/20 1900 05/18/20 1930   BP: 101/64 109/76 116/72 (!) 133/101   Pulse: 72 81 81 61   Resp: 20 18     Temp:       TempSrc:       SpO2: 95% 98% 98% 97%   Weight:       Height:         Medications - No data to display  ECG/EMG Results (last 24 hours)     Procedure Component Value Units Date/Time    ECG 12 Lead [[id number]] Collected:  05/18/20 1601     Updated:  05/18/20 1731    Narrative:       Test Reason : Dysrhythmia triage protocol  Blood Pressure : **/** mmHG  Vent. Rate : 077 BPM     Atrial  Rate : 077 BPM     P-R Int : 254 ms          QRS Dur : 094 ms      QT Int : 378 ms       P-R-T Axes : -27 -26 150 degrees     QTc Int : 427 ms    Atrial flutter with 4:1 conduction  Abnormal ECG  No previous ECGs available  Confirmed by GISELL IBRAHIM MD ([id number]) on 5/18/2020 5:31:48 PM    Referred By:  MICHAEL           Confirmed By:GISELL IBRAHIM MD        ECG 12 Lead   Final Result   Test Reason : Dysrhythmia triage protocol   Blood Pressure : **/** mmHG   Vent. Rate : 077 BPM     Atrial Rate : 077 BPM      P-R Int : 254 ms          QRS Dur : 094 ms       QT Int : 378 ms       P-R-T Axes : -27 -26 150 degrees      QTc Int : 427 ms      Atrial flutter with 4:1 conduction   Abnormal ECG   No previous ECGs available   Confirmed by GISELL IBRAHIM MD ([id number]) on 5/18/2020 5:31:48 PM      Referred By:  EDMD           Confirmed By:GISELL IBRAHIM MD                                                   MDM    Final diagnoses:   Atrial flutter, unspecified type (CMS/HCC)   Palpitations       Documentation assistance provided by nimesh Galindo.  Information recorded by the scribe was done at my direction and has been verified and validated by me.     Taras Galindo  05/18/20 0016       Gisell Ibrahim MD  05/19/20 4292

## 2020-05-18 NOTE — TELEPHONE ENCOUNTER
Called patient and he says he is very scared and is feeling worse. He took 50mg of metoprolol without improvement. He notes SOB, lightheadedness, and feeling like he is going to die. I reviewed his event monitor strip from this morning which showed a new diagnosis of afib. Patient is already about to the ED. Due to the severity of his reported symptoms, he will proceed with ED evaluation.

## 2020-05-21 ENCOUNTER — CONSULT (OUTPATIENT)
Dept: CARDIOLOGY | Facility: CLINIC | Age: 65
End: 2020-05-21

## 2020-05-21 VITALS
HEART RATE: 70 BPM | WEIGHT: 151.2 LBS | TEMPERATURE: 97.6 F | SYSTOLIC BLOOD PRESSURE: 138 MMHG | HEIGHT: 68 IN | OXYGEN SATURATION: 99 % | DIASTOLIC BLOOD PRESSURE: 60 MMHG | BODY MASS INDEX: 22.91 KG/M2

## 2020-05-21 DIAGNOSIS — Z72.0 NICOTINE ABUSE: ICD-10-CM

## 2020-05-21 DIAGNOSIS — R55 VASOVAGAL SYNCOPE: ICD-10-CM

## 2020-05-21 DIAGNOSIS — I47.1 SVT (SUPRAVENTRICULAR TACHYCARDIA) (HCC): Primary | ICD-10-CM

## 2020-05-21 DIAGNOSIS — I48.3 TYPICAL ATRIAL FLUTTER (HCC): ICD-10-CM

## 2020-05-21 DIAGNOSIS — R00.2 PALPITATIONS: ICD-10-CM

## 2020-05-21 PROCEDURE — 99245 OFF/OP CONSLTJ NEW/EST HI 55: CPT | Performed by: INTERNAL MEDICINE

## 2020-05-21 NOTE — PROGRESS NOTES
Cardiac Electrophysiology Outpatient Consult Note            [facility]     Consult Note     Stepan Gaines  [id number]  2020       Primary Care Physician: Zeferino Diallo MD    Referred By: Earnestine Hernandez A*    Subjective     Chief Complaint:   Chief Complaint   Patient presents with   • SVT (supraventricular tachycardia     Consult   • Atrial Fibrillation       History of Present Illness:   Mr. Stepan Gaines is a 64 y.o. male who presents to my electrophysiology clinic for evaluation of atrial flutter.  He had been seen several years ago for SVT which by my review seems to be short runs of atrial tachycardia.  Since then life has been cruel.  Initially he moved to [place] to take care of his mother-in-law who .  10 months ago his wife was diagnosed with lung cancer.  She  in 2020 thousand and 20.  He is mourning.  He is in significant emotional pain.  He is under a lot of stress additionally as he is also caring for now 1 of his parents who is quite ill.  He tells me that he has palpitations really at night especially when his heart rate gets fast.  When it does not fast he still feels short of breath and has difficulty ambulating with breathing.  He knows that the flutter is making him feel bad.  He does note that he has no issue with the blood thinner.  He started this last night.    He has no chest pain nausea vomiting fevers or chills or COVID-19 exposure.    Review of Systems:   Constitutional: No fevers or chills, no recent weight gain or weight loss or fatigue  Eyes: No visual loss, blurred vision, double vision, yellow sclerae.  ENT: No headaches, hearing loss, vertigo, congestion or sore throat.   Cardiovascular: Per HPI  Respiratory: No cough or wheezing, no sputum production, no hematemesis   Gastrointestinal: No abdominal pain, no nausea, vomiting, constipation, diarrhea, melena.   Genitourinary: No dysuria, hematuria or  increased frequency.  Musculoskeletal:  No gait disturbance, weakness or joint pain or stiffness  Integumentary: No rashes, urticaria, ulcers or sores.   Neurological: No headache, dizziness, syncope, paralysis, ataxia, no prior CVA/TIA  Psychiatric: No anxiety, or depression  Endocrine: No diaphoresis, cold or heat intolerance. No polyuria or polydipsia.   Hematologic/Lymphatic: No anemia, abnormal bruising or bleeding. No history of DVT/PE.        Past Medical History:   Past Medical History:   Diagnosis Date   • Anxiety    • Arthritis    • Cancer (CMS/HCC)     melanoma back   • COPD (chronic obstructive pulmonary disease) (CMS/HCC)    • Fainting    • Hypertension        Past Surgical History:   Past Surgical History:   Procedure Laterality Date   • CATARACT EXTRACTION      right eye   • ELBOW ARTHROSCOPY Left     left elbow twice   • KNEE ARTHROSCOPY Right    • SHOULDER ARTHROSCOPY Right     left and right but had left shoulder surgery twice   • TOE ARTHROPLASTY Right        Family History:   Family History   Problem Relation Age of Onset   • Hyperlipidemia Mother    • Lung cancer Father    • Diabetes Father    • Kidney failure Father        Social History:   Social History     Socioeconomic History   • Marital status:      Spouse name: Not on file   • Number of children: Not on file   • Years of education: Not on file   • Highest education level: Not on file   Tobacco Use   • Smoking status: Current Every Day Smoker     Packs/day: 1.00     Types: Cigarettes   • Smokeless tobacco: Never Used   Substance and Sexual Activity   • Alcohol use: No   • Drug use: No   • Sexual activity: Defer   Social History Narrative    caffeine use: 1-2 servings of coffee in the am occasionally coke        Medications:     Current Outpatient Medications:   •  apixaban (ELIQUIS) 5 MG tablet tablet, Take 1 tablet by mouth 2 (Two) Times a Day., Disp: 60 tablet, Rfl: 0  •  metoprolol tartrate (LOPRESSOR) 25 MG tablet, Take 1  "tablet by mouth Every 12 (Twelve) Hours. May take extra tablet as needed for tachycardia (Patient taking differently: Take 50 mg by mouth Every 12 (Twelve) Hours. May take extra tablet as needed for tachycardia), Disp: 90 tablet, Rfl: 1  •  triamterene-hydrochlorothiazide (MAXZIDE-25) 37.5-25 MG per tablet, Take 1 tablet by mouth As Needed (Daily as  needed for systolic BP greater than 140 mmhg.)., Disp: 30 tablet, Rfl: 6    Allergies:   No Known Allergies    Objective     Physical Exam:  Vital Signs:   Vitals:    05/21/20 1100   BP: 138/60   BP Location: Left arm   Patient Position: Sitting   Pulse: 70   Temp: 97.6 °F (36.4 °C)   SpO2: 99%   Weight: 68.6 kg (151 lb 3.2 oz)   Height: 172.7 cm (68\")     GEN: Well nourished, well-developed, no acute distress  HEENT: Normocephalic, atraumatic, moist mucous membranes  NECK: Supple, no JVD, no thyromegaly, no lymphadenopathy  CARD: Regular rate and rhythm, normal S1 & S2 are present.  No murmur, gallop or rubs are appreciated.  LUNGS: Clear to auscultation bilateraly, normal respiratory effort  ABDOMEN: Soft, nontender, normal bowel sounds  EXTREMITIES: No gross deformities, no clubbing, cyanosis.  Edema none  SKIN: Warm, dry  NEURO: No focal deficits, alert and oriented x 3  PSYCHIATRIC: Normal affect and mood, appropriate use of semantics and logic.      Lab Results   Component Value Date    GLUCOSE 115 (H) 05/18/2020    CALCIUM 8.6 05/18/2020     (L) 05/18/2020    K 3.5 05/18/2020    CO2 20.0 (L) 05/18/2020    CL 97 (L) 05/18/2020    BUN 6 (L) 05/18/2020    CREATININE 0.82 05/18/2020    EGFRIFNONA 95 05/18/2020    BCR 7.3 05/18/2020    ANIONGAP 15.0 05/18/2020     Lab Results   Component Value Date    WBC 7.51 05/18/2020    HGB 14.4 05/18/2020    HCT 41.5 05/18/2020    MCV 96.7 05/18/2020     05/18/2020     No results found for: INR, PROTIME  Lab Results   Component Value Date    TSH 0.959 05/18/2020       Cardiac Testing:      I personally viewed and " interpreted the patient's EKG/Telemetry/lab data    Procedures    Tobacco Cessation: N/A /he is quitting smoking.  He has not smoked in a while.  He is using a nicotine patch.  Obstructive Sleep Apnea Screening: N/A    Assessment & Plan      Very pleasant gentleman 64 years of age with a chads vas score of 1 due to essential hypertension now with what appears to be typical counterclockwise right atrial flutter.  He had been seen 7 years ago with a history of SVT which by my review appears to be atrial tachycardia.  He has no known history of A. fib however I did obviously discussed with him that he has a approximately 20 to 25% chance of having concomitant occult paroxysmal atrial fibrillation.  This gentleman is an emotional pain.  His wife of many years  from lung cancer in February.  He also is taking care of F parent in [facility] who has some cognitive senescent issues.    His flutter is symptomatic.  It does not make him feel good.  He reminds me that he has significant issues with intubation.  He is apparently a lot of scar tissue in his trachea from prior traumatic intubations.  His strong preference is not to have any manipulation or intubation with a IMELDA probe or endotracheal road.    I discussed the option of catheter ablation for typical flutter.  I reviewed with him the mechanism of typical flutter.  Reviewed with him that the procedure is done under moderate sedation.  I reviewed with him also that we would be able to do this procedure with a approximately 1% chance of complication.  The procedure is greater than 97% of the time able to eliminate typical flutter.  Of course if he has occult concomitant paroxysmal atrial fibrillation then this procedure would only unmask this and would do nothing to reduce A. fib.  In the absence of A. fib however I would advocate that 30 days after his ablation procedure we stop his anticoagulation.    He wishes to schedule an ablation procedure.  We will do so in the  near future.  We will use the [employer] mapping system.  We will also schedule the procedure to be performed in combination with intracardiac echo to rule out left atrial appendage clot.    He will take an extra dose of metoprolol at night as this is when his palpitations seem to be worse.  Schedule him as soon as possible for his ablation.    Stepan was seen today for svt (supraventricular tachycardia and atrial fibrillation.    Diagnoses and all orders for this visit:    SVT (supraventricular tachycardia) (CMS/[facility])        Follow Up:       Thank you for allowing me to participate in the care of your patient. Please to not hesitate to contact me with additional questions or concerns.        Beto Solomon, DO, [facility], [facility]  Cardiac Electrophysiologist            Answers for HPI/ROS submitted by the patient on 5/21/2020   What is the primary reason for your visit?: Other  Please describe your symptoms.: a-fib  Have you had these symptoms before?: Yes  How long have you been having these symptoms?: Greater than 2 weeks  Please list any medications you are currently taking for this condition.: metaprolol 50 mg x 2.       triamterene 37.5-25 as needed.  elequis 5 mg x2

## 2020-05-28 ENCOUNTER — TELEPHONE (OUTPATIENT)
Dept: CARDIOLOGY | Facility: HOSPITAL | Age: 65
End: 2020-05-28

## 2020-05-28 RX ORDER — METOPROLOL TARTRATE 50 MG/1
50 TABLET, FILM COATED ORAL EVERY 12 HOURS
Qty: 90 TABLET | Refills: 2 | Status: SHIPPED | OUTPATIENT
Start: 2020-05-28 | End: 2020-06-24 | Stop reason: SDUPTHER

## 2020-06-07 ENCOUNTER — APPOINTMENT (OUTPATIENT)
Dept: PREADMISSION TESTING | Facility: HOSPITAL | Age: 65
End: 2020-06-07

## 2020-06-07 PROCEDURE — U0004 COV-19 TEST NON-CDC HGH THRU: HCPCS

## 2020-06-07 PROCEDURE — C9803 HOPD COVID-19 SPEC COLLECT: HCPCS

## 2020-06-07 PROCEDURE — U0002 COVID-19 LAB TEST NON-CDC: HCPCS

## 2020-06-08 ENCOUNTER — OFFICE VISIT (OUTPATIENT)
Dept: FAMILY MEDICINE CLINIC | Facility: CLINIC | Age: 65
End: 2020-06-08

## 2020-06-08 VITALS
BODY MASS INDEX: 22.43 KG/M2 | WEIGHT: 148 LBS | OXYGEN SATURATION: 99 % | HEART RATE: 71 BPM | RESPIRATION RATE: 16 BRPM | TEMPERATURE: 97.8 F | SYSTOLIC BLOOD PRESSURE: 130 MMHG | DIASTOLIC BLOOD PRESSURE: 70 MMHG | HEIGHT: 68 IN

## 2020-06-08 DIAGNOSIS — F51.01 PRIMARY INSOMNIA: Primary | ICD-10-CM

## 2020-06-08 DIAGNOSIS — R21 RASH: ICD-10-CM

## 2020-06-08 LAB
REF LAB TEST METHOD: NORMAL
SARS-COV-2 RNA RESP QL NAA+PROBE: NOT DETECTED

## 2020-06-08 PROCEDURE — 99213 OFFICE O/P EST LOW 20 MIN: CPT | Performed by: PHYSICIAN ASSISTANT

## 2020-06-08 RX ORDER — FLUOCINONIDE TOPICAL SOLUTION USP, 0.05% 0.5 MG/ML
SOLUTION TOPICAL 2 TIMES DAILY
Qty: 20 ML | Refills: 11 | Status: SHIPPED | OUTPATIENT
Start: 2020-06-08 | End: 2020-12-01

## 2020-06-08 RX ORDER — TRAZODONE HYDROCHLORIDE 50 MG/1
50 TABLET ORAL NIGHTLY
Qty: 30 TABLET | Refills: 11 | Status: SHIPPED | OUTPATIENT
Start: 2020-06-08 | End: 2020-09-10 | Stop reason: ALTCHOICE

## 2020-06-08 NOTE — PROGRESS NOTES
I have reviewed the notes, assessments, and/or procedures performed by Garret Argueta PA-C, I concur with his documentation of Stepan Gaines.

## 2020-06-08 NOTE — PROGRESS NOTES
Subjective   Stepan Gaines is a 64 y.o. male  Establish Care (No recent PCP. Sees cardio-scheduled for ablation 6/10)      History of Present Illness  Patient new patient 64-year-old white male who comes in complaining of insomnia trouble falling asleep staying asleep he states he will sleep 4 to 5 hours per night he most recently had a heart ablation patient states he feels fine but he is having trouble falling asleep staying asleep like to have something to help him sleep no SI/HIAnswers for HPI/ROS submitted by the patient on 6/1/2020   Patient also has rash on scalp scaly like something for it patient states he is been told he has psoriasis of the scalp itchy irritated been on for last couple of months    The following portions of the patient's history were reviewed and updated as appropriate: allergies, current medications, past social history and problem list    Review of Systems   Constitutional: Negative for appetite change, diaphoresis, fatigue and unexpected weight change.   Eyes: Negative for visual disturbance.   Respiratory: Negative for cough, chest tightness and shortness of breath.    Cardiovascular: Negative for chest pain, palpitations and leg swelling.   Gastrointestinal: Negative for diarrhea, nausea and vomiting.   Endocrine: Negative for polydipsia, polyphagia and polyuria.   Skin: Positive for rash. Negative for color change.   Neurological: Negative for dizziness, syncope, weakness, light-headedness, numbness and headaches.       Objective     Vitals:    06/08/20 1117   BP: 130/70   Pulse: 71   Resp: 16   Temp: 97.8 °F (36.6 °C)   SpO2: 99%       Physical Exam   Constitutional: He appears well-developed and well-nourished.   Neck: Neck supple. No JVD present. No thyromegaly present.   Cardiovascular: Normal rate, regular rhythm, normal heart sounds, intact distal pulses and normal pulses.   No murmur heard.  Pulmonary/Chest: Effort normal and breath sounds normal. No respiratory distress.    Abdominal: Soft. Bowel sounds are normal. There is no hepatosplenomegaly. There is no tenderness.   Musculoskeletal: He exhibits no edema.   Lymphadenopathy:     He has no cervical adenopathy.   Neurological: No sensory deficit.   Skin: Skin is warm and dry. He is not diaphoretic.        Nursing note and vitals reviewed.      Assessment/Plan     Stepan was seen today for establish care.    Diagnoses and all orders for this visit:    Primary insomnia  -     traZODone (DESYREL) 50 MG tablet; Take 1 tablet by mouth Every Night.    Rash  -     fluocinonide (LIDEX) 0.05 % external solution; Apply  topically to the appropriate area as directed 2 (Two) Times a Day.    Follow-up as needed

## 2020-06-09 ENCOUNTER — PREP FOR SURGERY (OUTPATIENT)
Dept: OTHER | Facility: HOSPITAL | Age: 65
End: 2020-06-09

## 2020-06-09 DIAGNOSIS — I48.3 TYPICAL ATRIAL FLUTTER (HCC): Primary | ICD-10-CM

## 2020-06-09 RX ORDER — ACETAMINOPHEN 325 MG/1
650 TABLET ORAL EVERY 4 HOURS PRN
Status: CANCELLED | OUTPATIENT
Start: 2020-06-09

## 2020-06-09 RX ORDER — NITROGLYCERIN 0.4 MG/1
0.4 TABLET SUBLINGUAL
Status: CANCELLED | OUTPATIENT
Start: 2020-06-09

## 2020-06-09 RX ORDER — ONDANSETRON 2 MG/ML
4 INJECTION INTRAMUSCULAR; INTRAVENOUS EVERY 6 HOURS PRN
Status: CANCELLED | OUTPATIENT
Start: 2020-06-09

## 2020-06-09 RX ORDER — SODIUM CHLORIDE 0.9 % (FLUSH) 0.9 %
3 SYRINGE (ML) INJECTION EVERY 12 HOURS SCHEDULED
Status: CANCELLED | OUTPATIENT
Start: 2020-06-09

## 2020-06-09 RX ORDER — SODIUM CHLORIDE 0.9 % (FLUSH) 0.9 %
10 SYRINGE (ML) INJECTION AS NEEDED
Status: CANCELLED | OUTPATIENT
Start: 2020-06-09

## 2020-06-10 ENCOUNTER — HOSPITAL ENCOUNTER (OUTPATIENT)
Facility: HOSPITAL | Age: 65
Discharge: HOME | End: 2020-06-10
Attending: INTERNAL MEDICINE | Admitting: INTERNAL MEDICINE

## 2020-06-10 VITALS
HEIGHT: 68 IN | HEART RATE: 60 BPM | TEMPERATURE: 98.3 F | RESPIRATION RATE: 14 BRPM | SYSTOLIC BLOOD PRESSURE: 143 MMHG | WEIGHT: 149.47 LBS | BODY MASS INDEX: 22.65 KG/M2 | DIASTOLIC BLOOD PRESSURE: 74 MMHG | OXYGEN SATURATION: 98 %

## 2020-06-10 DIAGNOSIS — I47.1 SVT (SUPRAVENTRICULAR TACHYCARDIA) (HCC): ICD-10-CM

## 2020-06-10 DIAGNOSIS — I48.3 TYPICAL ATRIAL FLUTTER (HCC): ICD-10-CM

## 2020-06-10 LAB
ANION GAP SERPL CALCULATED.3IONS-SCNC: 12 MMOL/L (ref 5–15)
BUN BLD-MCNC: 5 MG/DL (ref 8–23)
BUN/CREAT SERPL: 6.4 (ref 7–25)
CALCIUM SPEC-SCNC: 8.3 MG/DL (ref 8.6–10.5)
CHLORIDE SERPL-SCNC: 105 MMOL/L (ref 98–107)
CO2 SERPL-SCNC: 22 MMOL/L (ref 22–29)
CREAT BLD-MCNC: 0.78 MG/DL (ref 0.76–1.27)
DEPRECATED RDW RBC AUTO: 47.2 FL (ref 37–54)
ERYTHROCYTE [DISTWIDTH] IN BLOOD BY AUTOMATED COUNT: 12.5 % (ref 12.3–15.4)
GFR SERPL CREATININE-BSD FRML MDRD: 101 ML/MIN/1.73
GLUCOSE BLD-MCNC: 108 MG/DL (ref 65–99)
HCT VFR BLD AUTO: 40.7 % (ref 37.5–51)
HGB BLD-MCNC: 13.6 G/DL (ref 13–17.7)
MCH RBC QN AUTO: 34.2 PG (ref 26.6–33)
MCHC RBC AUTO-ENTMCNC: 33.4 G/DL (ref 31.5–35.7)
MCV RBC AUTO: 102.3 FL (ref 79–97)
PLATELET # BLD AUTO: 130 10*3/MM3 (ref 140–450)
PMV BLD AUTO: 9.9 FL (ref 6–12)
POTASSIUM BLD-SCNC: 4.3 MMOL/L (ref 3.5–5.2)
RBC # BLD AUTO: 3.98 10*6/MM3 (ref 4.14–5.8)
SODIUM BLD-SCNC: 139 MMOL/L (ref 136–145)
WBC NRBC COR # BLD: 6.07 10*3/MM3 (ref 3.4–10.8)

## 2020-06-10 PROCEDURE — 93653 COMPRE EP EVAL TX SVT: CPT | Performed by: INTERNAL MEDICINE

## 2020-06-10 PROCEDURE — 99152 MOD SED SAME PHYS/QHP 5/>YRS: CPT | Performed by: INTERNAL MEDICINE

## 2020-06-10 PROCEDURE — 85027 COMPLETE CBC AUTOMATED: CPT | Performed by: NURSE PRACTITIONER

## 2020-06-10 PROCEDURE — 93621 COMP EP EVL L PAC&REC C SINS: CPT | Performed by: INTERNAL MEDICINE

## 2020-06-10 PROCEDURE — C1894 INTRO/SHEATH, NON-LASER: HCPCS | Performed by: INTERNAL MEDICINE

## 2020-06-10 PROCEDURE — 93613 INTRACARDIAC EPHYS 3D MAPG: CPT | Performed by: INTERNAL MEDICINE

## 2020-06-10 PROCEDURE — 25010000002 FENTANYL CITRATE (PF) 100 MCG/2ML SOLUTION: Performed by: INTERNAL MEDICINE

## 2020-06-10 PROCEDURE — 36415 COLL VENOUS BLD VENIPUNCTURE: CPT

## 2020-06-10 PROCEDURE — C1732 CATH, EP, DIAG/ABL, 3D/VECT: HCPCS | Performed by: INTERNAL MEDICINE

## 2020-06-10 PROCEDURE — C1730 CATH, EP, 19 OR FEW ELECT: HCPCS | Performed by: INTERNAL MEDICINE

## 2020-06-10 PROCEDURE — 25010000003 LIDOCAINE 1 % SOLUTION: Performed by: INTERNAL MEDICINE

## 2020-06-10 PROCEDURE — 93662 INTRACARDIAC ECG (ICE): CPT | Performed by: INTERNAL MEDICINE

## 2020-06-10 PROCEDURE — 93623 PRGRMD STIMJ&PACG IV RX NFS: CPT | Performed by: INTERNAL MEDICINE

## 2020-06-10 PROCEDURE — 25010000002 MIDAZOLAM PER 1 MG: Performed by: INTERNAL MEDICINE

## 2020-06-10 PROCEDURE — C1759 CATH, INTRA ECHOCARDIOGRAPHY: HCPCS | Performed by: INTERNAL MEDICINE

## 2020-06-10 PROCEDURE — 99153 MOD SED SAME PHYS/QHP EA: CPT | Performed by: INTERNAL MEDICINE

## 2020-06-10 PROCEDURE — 25010000002 ADENOSINE PER 6 MG: Performed by: INTERNAL MEDICINE

## 2020-06-10 PROCEDURE — 80048 BASIC METABOLIC PNL TOTAL CA: CPT | Performed by: NURSE PRACTITIONER

## 2020-06-10 PROCEDURE — C1766 INTRO/SHEATH,STRBLE,NON-PEEL: HCPCS | Performed by: INTERNAL MEDICINE

## 2020-06-10 PROCEDURE — 25010000002 PROMETHAZINE PER 50 MG: Performed by: INTERNAL MEDICINE

## 2020-06-10 RX ORDER — LIDOCAINE HYDROCHLORIDE 10 MG/ML
INJECTION, SOLUTION INFILTRATION; PERINEURAL AS NEEDED
Status: DISCONTINUED | OUTPATIENT
Start: 2020-06-10 | End: 2020-06-10 | Stop reason: HOSPADM

## 2020-06-10 RX ORDER — SODIUM CHLORIDE 0.9 % (FLUSH) 0.9 %
3 SYRINGE (ML) INJECTION EVERY 12 HOURS SCHEDULED
Status: DISCONTINUED | OUTPATIENT
Start: 2020-06-10 | End: 2020-06-10 | Stop reason: HOSPADM

## 2020-06-10 RX ORDER — PROMETHAZINE HYDROCHLORIDE 25 MG/ML
INJECTION, SOLUTION INTRAMUSCULAR; INTRAVENOUS AS NEEDED
Status: DISCONTINUED | OUTPATIENT
Start: 2020-06-10 | End: 2020-06-10 | Stop reason: HOSPADM

## 2020-06-10 RX ORDER — SODIUM CHLORIDE 9 MG/ML
INJECTION, SOLUTION INTRAVENOUS CONTINUOUS PRN
Status: COMPLETED | OUTPATIENT
Start: 2020-06-10 | End: 2020-06-10

## 2020-06-10 RX ORDER — NITROGLYCERIN 0.4 MG/1
0.4 TABLET SUBLINGUAL
Status: DISCONTINUED | OUTPATIENT
Start: 2020-06-10 | End: 2020-06-10 | Stop reason: HOSPADM

## 2020-06-10 RX ORDER — FENTANYL CITRATE 50 UG/ML
INJECTION, SOLUTION INTRAMUSCULAR; INTRAVENOUS AS NEEDED
Status: DISCONTINUED | OUTPATIENT
Start: 2020-06-10 | End: 2020-06-10 | Stop reason: HOSPADM

## 2020-06-10 RX ORDER — SODIUM CHLORIDE 0.9 % (FLUSH) 0.9 %
10 SYRINGE (ML) INJECTION AS NEEDED
Status: DISCONTINUED | OUTPATIENT
Start: 2020-06-10 | End: 2020-06-10 | Stop reason: HOSPADM

## 2020-06-10 RX ORDER — MIDAZOLAM HYDROCHLORIDE 1 MG/ML
INJECTION INTRAMUSCULAR; INTRAVENOUS AS NEEDED
Status: DISCONTINUED | OUTPATIENT
Start: 2020-06-10 | End: 2020-06-10 | Stop reason: HOSPADM

## 2020-06-10 RX ORDER — ACETAMINOPHEN 325 MG/1
650 TABLET ORAL EVERY 4 HOURS PRN
Status: DISCONTINUED | OUTPATIENT
Start: 2020-06-10 | End: 2020-06-10 | Stop reason: HOSPADM

## 2020-06-10 RX ORDER — ONDANSETRON 2 MG/ML
4 INJECTION INTRAMUSCULAR; INTRAVENOUS EVERY 6 HOURS PRN
Status: DISCONTINUED | OUTPATIENT
Start: 2020-06-10 | End: 2020-06-10 | Stop reason: HOSPADM

## 2020-06-10 RX ORDER — ADENOSINE 3 MG/ML
INJECTION, SOLUTION INTRAVENOUS AS NEEDED
Status: DISCONTINUED | OUTPATIENT
Start: 2020-06-10 | End: 2020-06-10 | Stop reason: HOSPADM

## 2020-06-10 RX ORDER — BUPIVACAINE HYDROCHLORIDE 5 MG/ML
INJECTION, SOLUTION PERINEURAL AS NEEDED
Status: DISCONTINUED | OUTPATIENT
Start: 2020-06-10 | End: 2020-06-10 | Stop reason: HOSPADM

## 2020-06-24 RX ORDER — METOPROLOL TARTRATE 50 MG/1
50 TABLET, FILM COATED ORAL EVERY 12 HOURS
Qty: 30 TABLET | Refills: 11 | Status: SHIPPED | OUTPATIENT
Start: 2020-06-24 | End: 2020-07-13 | Stop reason: SDUPTHER

## 2020-07-13 ENCOUNTER — TELEPHONE (OUTPATIENT)
Dept: FAMILY MEDICINE CLINIC | Facility: CLINIC | Age: 65
End: 2020-07-13

## 2020-07-13 ENCOUNTER — OFFICE VISIT (OUTPATIENT)
Dept: CARDIOLOGY | Facility: CLINIC | Age: 65
End: 2020-07-13

## 2020-07-13 VITALS
TEMPERATURE: 97.8 F | WEIGHT: 153 LBS | SYSTOLIC BLOOD PRESSURE: 110 MMHG | OXYGEN SATURATION: 97 % | BODY MASS INDEX: 22.66 KG/M2 | DIASTOLIC BLOOD PRESSURE: 60 MMHG | HEIGHT: 69 IN | HEART RATE: 68 BPM

## 2020-07-13 DIAGNOSIS — I48.3 TYPICAL ATRIAL FLUTTER (HCC): Primary | ICD-10-CM

## 2020-07-13 DIAGNOSIS — R00.2 PALPITATIONS: ICD-10-CM

## 2020-07-13 DIAGNOSIS — G47.00 INSOMNIA, UNSPECIFIED TYPE: ICD-10-CM

## 2020-07-13 PROCEDURE — 93000 ELECTROCARDIOGRAM COMPLETE: CPT | Performed by: NURSE PRACTITIONER

## 2020-07-13 PROCEDURE — 99213 OFFICE O/P EST LOW 20 MIN: CPT | Performed by: NURSE PRACTITIONER

## 2020-07-13 RX ORDER — METOPROLOL TARTRATE 50 MG/1
50 TABLET, FILM COATED ORAL EVERY 12 HOURS
Qty: 30 TABLET | Refills: 11 | Status: SHIPPED | OUTPATIENT
Start: 2020-07-13 | End: 2021-01-21 | Stop reason: SDUPTHER

## 2020-07-13 RX ORDER — GENTAMICIN SULFATE 3 MG/ML
1 SOLUTION/ DROPS OPHTHALMIC 4 TIMES DAILY
Qty: 15 ML | Refills: 0 | Status: SHIPPED | OUTPATIENT
Start: 2020-07-13 | End: 2020-12-01

## 2020-07-13 NOTE — TELEPHONE ENCOUNTER
Patient is calling stating that his eye is very red and itchy also has drainage coming out of it as well. Patient would like some sort of a eye drop to help with this. Please advise and call back    [phone]     Confirmed pharmacy

## 2020-07-13 NOTE — PROGRESS NOTES
Cardiac Electrophysiology Outpatient Follow Up Note            [facility]    Follow Up Office Visit      Stepan Gaines  [id number]  07/13/2020    Primary Care Physician: John Argueta PA    Referred By: No ref. provider found    Subjective     Chief Complaint:   Chief Complaint   Patient presents with   • ABLASION F/U       History of Present Illness:   Mr. Stepan Gaines is a 64 y.o. male who presents to my electrophysiology clinic for follow up of his electrophysiology study on 6/10/2020 with RFA of typical right atrial flutter.  Upon presentation today patient reports a significant improvement in his symptomology after his ablation.  He denies palpitations, chest pain, shortness of air, dizziness, or presyncope.  He reports compliance with his anticoagulation therapy 30 days after procedure but has since discontinued his Eliquis as instructed.  Patient denies complications with Eliquis while taking post procedure.  He denies history of atrial fibrillation.  Patient denies a history of sleep apnea but reports very poor sleep habits only getting approximately 3 hours of sleep on average per night.     Review of Systems:   Constitutional: No fevers or chills, no recent weight gain or weight loss or fatigue  Eyes: No visual loss, blurred vision, double vision, yellow sclerae.  ENT: No headaches, hearing loss, vertigo, congestion or sore throat.   Cardiovascular: Per HPI  Respiratory: No cough or wheezing, no sputum production, no hematemesis   Gastrointestinal: No abdominal pain, no nausea, vomiting, constipation, diarrhea, melena.   Genitourinary: No dysuria, hematuria or increased frequency.  Musculoskeletal:  No gait disturbance, weakness or joint pain or stiffness  Integumentary: No rashes, urticaria, ulcers or sores.   Neurological: No headache, dizziness, syncope, paralysis, ataxia, no prior CVA/TIA  Psychiatric: No anxiety, or  depression  Endocrine: No diaphoresis, cold or heat intolerance. No polyuria or polydipsia.   Hematologic/Lymphatic: No anemia, abnormal bruising or bleeding. No history of DVT/PE.      Past Medical History:   Past Medical History:   Diagnosis Date   • A-fib (CMS/HCC)    • Anxiety    • Arthritis    • Cancer (CMS/HCC)     melanoma back   • Cataract    • COPD (chronic obstructive pulmonary disease) (CMS/HCC)    • Fainting    • Hypertension        Past Surgical History:   Past Surgical History:   Procedure Laterality Date   • CARDIAC ELECTROPHYSIOLOGY PROCEDURE N/A 6/10/2020    Procedure: EP/Ablation Atypical AFL w ICE & SJM or Carto mapping, ASAP, pt has no meds that need held;  Surgeon: Beto Solomon DO;  Location:  [facility];  Service: Cardiology;  Laterality: N/A;   • CATARACT EXTRACTION      right eye   • ELBOW ARTHROSCOPY Left     left elbow twice   • KNEE ARTHROSCOPY Right    • SHOULDER ARTHROSCOPY Right     left and right but had left shoulder surgery twice   • TOE ARTHROPLASTY Right        Family History:   Family History   Problem Relation Age of Onset   • Hyperlipidemia Mother    • Lung cancer Father    • Diabetes Father    • Kidney failure Father        Social History:   Social History     Socioeconomic History   • Marital status:      Spouse name: Not on file   • Number of children: Not on file   • Years of education: Not on file   • Highest education level: Not on file   Tobacco Use   • Smoking status: Current Every Day Smoker     Packs/day: 0.50     Types: Cigarettes   • Smokeless tobacco: Never Used   Substance and Sexual Activity   • Alcohol use: Yes     Comment: 7-10 drinks/week   • Drug use: Never   • Sexual activity: Defer   Social History Narrative    caffeine use: 1-2 servings of coffee in the am occasionally coke        Medications:     Current Outpatient Medications:   •  fluocinonide (LIDEX) 0.05 % external solution, Apply  topically to the appropriate area as directed  "2 (Two) Times a Day., Disp: 20 mL, Rfl: 11  •  metoprolol tartrate (LOPRESSOR) 50 MG tablet, Take 1 tablet by mouth Every 12 (Twelve) Hours. May take extra tablet as needed for tachycardia. Further refills per Dr. Solomon, Disp: 30 tablet, Rfl: 11  •  traZODone (DESYREL) 50 MG tablet, Take 1 tablet by mouth Every Night., Disp: 30 tablet, Rfl: 11  •  triamterene-hydrochlorothiazide (MAXZIDE-25) 37.5-25 MG per tablet, Take 1 tablet by mouth As Needed (Daily as  needed for systolic BP greater than 140 mmhg.)., Disp: 30 tablet, Rfl: 6    Allergies:   No Known Allergies    Objective     Physical Exam:  Vital Signs:   Vitals:    07/13/20 1051   BP: 110/60   BP Location: Right arm   Patient Position: Sitting   Pulse: 68   Temp: 97.8 °F (36.6 °C)   SpO2: 97%   Weight: 69.4 kg (153 lb)   Height: 175.3 cm (69\")     GEN: Well nourished, well-developed, no acute distress  HEENT: Normocephalic, atraumatic, moist mucous membranes  NECK: Supple, no JVD, no thyromegaly, no lymphadenopathy  CARD: Regular rate and rhythm, normal S1 & S2 are present.  No murmur, gallop or rubs are appreciated.  LUNGS: Clear to auscultation bilateraly, normal respiratory effort  ABDOMEN: Soft, nontender, normal bowel sounds  EXTREMITIES: No gross deformities, no clubbing, cyanosis.  No edema   SKIN: Warm, dry  NEURO: No focal deficits, alert and oriented x 3  PSYCHIATRIC: Normal affect and mood, appropriate use of semantics and logic.        Lab Results   Component Value Date    GLUCOSE 108 (H) 06/10/2020    CALCIUM 8.3 (L) 06/10/2020     06/10/2020    K 4.3 06/10/2020    CO2 22.0 06/10/2020     06/10/2020    BUN 5 (L) 06/10/2020    CREATININE 0.78 06/10/2020    EGFRIFNONA 101 06/10/2020    BCR 6.4 (L) 06/10/2020    ANIONGAP 12.0 06/10/2020     Lab Results   Component Value Date    WBC 6.07 06/10/2020    HGB 13.6 06/10/2020    HCT 40.7 06/10/2020    .3 (H) 06/10/2020     (L) 06/10/2020     No results found for: INR, PROTIME  Lab " Results   Component Value Date    TSH 0.959 05/18/2020       Cardiac Testing:     I personally viewed and interpreted the patient's EKG/Telemetry/lab data      ECG 12 Lead  Date/Time: 7/13/2020 11:15 AM  Performed by: Evan Slade APRN  Authorized by: Evan Slade APRN   Comparison: compared with previous ECG from 5/18/2020  Comparison to previous ECG: Sinus rhythm has replaced atrial flutter  Rhythm: sinus rhythm  BPM: 60              Assessment & Plan        Stepan was seen today for ablasion f/u.    Diagnoses and all orders for this visit:    Typical atrial flutter (CMS/HCC)  -     ECG 12 Lead    Insomnia, unspecified type  -     Ambulatory Referral to Sleep Medicine    Palpitations    Other orders  -     metoprolol tartrate (LOPRESSOR) 50 MG tablet; Take 1 tablet by mouth Every 12 (Twelve) Hours. May take extra tablet as needed for tachycardia. Further refills per Dr. Solomon      Plan: Mr. Santiago is seen in EP follow-up status post typical right atrial flutter ablation.  Upon evaluation today patient reports feeling significantly better following his ablation without recurrent palpitations, fatigue, shortness of breath, or dizziness that he felt prior to his procedure.  Patient's Eliquis therapy was discontinued 30 days post procedure without documented atrial fibrillation in the past.  Patient does report concerning symptoms for sleep apnea with poor sleep habits and daytime sleepiness.  With patient's history of arrhythmias would favor sleep medicine consultation to evaluate for potential sleep apnea.  We will follow-up in 6 months to reevaluate.  We will continue metoprolol therapy 50 mg twice daily and consider decreasing on follow-up.    Follow Up: 6 Months      Thank you for allowing me to participate in the care of your patient. Please to not hesitate to contact me with additional questions or concerns.        Electronically signed by SHAW Doe, 07/13/20, 11:17  AM.

## 2020-08-11 ENCOUNTER — TELEPHONE (OUTPATIENT)
Dept: FAMILY MEDICINE CLINIC | Facility: CLINIC | Age: 65
End: 2020-08-11

## 2020-08-11 NOTE — TELEPHONE ENCOUNTER
PT CALLED STATED THAT DR HAS PRESCRIBED HIM RX  traZODone (DESYREL) 50 MG tablet, TO HELP HIM SLEEP. PT STATED THAT HE RX HELPS HIM GO TO SLEEP BUT ONLY SLEEPS FOR 2 HOURS AND HE IS BACK UP.    PLEASE ADVISE.  CALL BACK:[phone]     [facility]ILDA [address] - [address] AT [facility] & (JUWAN

## 2020-08-18 ENCOUNTER — TELEPHONE (OUTPATIENT)
Dept: FAMILY MEDICINE CLINIC | Facility: CLINIC | Age: 65
End: 2020-08-18

## 2020-08-18 RX ORDER — HYDROXYZINE PAMOATE 50 MG/1
50 CAPSULE ORAL NIGHTLY
Qty: 30 CAPSULE | Refills: 1 | Status: SHIPPED | OUTPATIENT
Start: 2020-08-18 | End: 2020-12-01

## 2020-09-09 ENCOUNTER — TELEPHONE (OUTPATIENT)
Dept: FAMILY MEDICINE CLINIC | Facility: CLINIC | Age: 65
End: 2020-09-09

## 2020-09-09 NOTE — TELEPHONE ENCOUNTER
PATIENT CALLED AND STATED THAT HE HS NOT BEEN FELLING WELL LATELY. HE HAS BEEN SNEEZING AND IS CHILLING. HE IS GOING TO GO GET COVID TESTED.    HE  STATED THE PROVIDER SWITCHED HIS MEDICATION FROM TRAZODONE TO VISTARIL IT IS NOT HELPING HE IS ONLY SLEEPING A FEW HOURS A NIGHT. HE CAN FALL ASLEEP BUT CANT STAY ASLEEP.     PLEASE CALL AND ADVISE -[phone]

## 2020-09-10 ENCOUNTER — OFFICE VISIT (OUTPATIENT)
Dept: FAMILY MEDICINE CLINIC | Facility: CLINIC | Age: 65
End: 2020-09-10

## 2020-09-10 VITALS
TEMPERATURE: 97.2 F | BODY MASS INDEX: 22.36 KG/M2 | OXYGEN SATURATION: 96 % | HEART RATE: 74 BPM | SYSTOLIC BLOOD PRESSURE: 130 MMHG | WEIGHT: 151 LBS | DIASTOLIC BLOOD PRESSURE: 68 MMHG | HEIGHT: 69 IN

## 2020-09-10 DIAGNOSIS — G44.319 ACUTE POST-TRAUMATIC HEADACHE, NOT INTRACTABLE: ICD-10-CM

## 2020-09-10 DIAGNOSIS — S09.90XA TRAUMATIC INJURY OF HEAD, INITIAL ENCOUNTER: Primary | ICD-10-CM

## 2020-09-10 DIAGNOSIS — G47.00 INSOMNIA, UNSPECIFIED TYPE: ICD-10-CM

## 2020-09-10 PROCEDURE — 99214 OFFICE O/P EST MOD 30 MIN: CPT | Performed by: PHYSICIAN ASSISTANT

## 2020-09-10 RX ORDER — AMITRIPTYLINE HYDROCHLORIDE 25 MG/1
25 TABLET, FILM COATED ORAL NIGHTLY
Qty: 30 TABLET | Refills: 1 | Status: SHIPPED | OUTPATIENT
Start: 2020-09-10 | End: 2020-12-01 | Stop reason: ALTCHOICE

## 2020-09-10 NOTE — PROGRESS NOTES
Subjective   Stepan Gaines is a 64 y.o. male  Head Injury (head injury due to fall x1 week, now having headaches and tenderness ) and Insomnia      History of Present Illness  Patient is a pleasant 64-year-old white male who presents today for evaluation treatment 2 separate problems.  He states that he fell off his porch last Tuesday hitting his forehead in front of his face on the sidewalk.  He is having pain over his right eye and around his right eye ever since.  He states that he feels some slight decrease in his vision as well.  He just saw his eye doctor 3 weeks ago.  He is dealing with some problems regarding some infection in his eye and has been using prescription gentamicin eyedrops from his eye doctor currently.  Patient denies loss of consciousness denies any dizziness nausea or vomiting denies any double vision.  Additionally continues to struggle with insomnia.  Has trouble falling asleep and staying asleep.  He was prescribed trazodone which did not work, hydroxyzine did not work and he states that he does  a prescription for amitriptyline today but has not tried it yet.  He is concerned that this may not work.  He is been a lot difficulty going to sleep since his wife passed away earlier this year.  He denies any depression to states that his routine got off while taking care of his wife with her terminal illness  The following portions of the patient's history were reviewed and updated as appropriate: allergies, current medications, past social history and problem list    Review of Systems   Constitutional: Negative for fatigue and unexpected weight change.   HENT: Negative for congestion, dental problem, postnasal drip, sinus pressure and sore throat.    Eyes: Positive for visual disturbance. Negative for photophobia and pain.   Gastrointestinal: Negative for nausea and vomiting.   Neurological: Positive for headaches. Negative for dizziness, tremors, syncope, facial asymmetry, speech  difficulty, weakness, light-headedness and numbness.   Psychiatric/Behavioral: Positive for sleep disturbance. Negative for agitation, behavioral problems, confusion, decreased concentration, dysphoric mood and hallucinations. The patient is not nervous/anxious and is not hyperactive.        Objective     Vitals:    09/10/20 1606   BP: 130/68   Pulse: 74   Temp: 97.2 °F (36.2 °C)   SpO2: 96%       Physical Exam   Constitutional: He is oriented to person, place, and time. He appears well-developed and well-nourished. No distress.   Eyes: Pupils are equal, round, and reactive to light. Conjunctivae and EOM are normal.   Neck: Normal range of motion. Neck supple.   Cardiovascular: Normal rate and regular rhythm.   Pulmonary/Chest: Effort normal. No respiratory distress.   Neurological: He is alert and oriented to person, place, and time. No cranial nerve deficit or sensory deficit. Coordination normal.   Skin: He is not diaphoretic. There is erythema.   Mild ecchymosis above right eye and around right side of nose   Psychiatric: He has a normal mood and affect. His speech is normal and behavior is normal. Judgment and thought content normal. Cognition and memory are normal.   Nursing note and vitals reviewed.      Assessment/Plan     Stepan was seen today for head injury and insomnia.    Diagnoses and all orders for this visit:    Traumatic injury of head, initial encounter  -     CT Head Without Contrast; Future    Acute post-traumatic headache, not intractable  -     CT Head Without Contrast; Future    Insomnia, unspecified type    Prescription given for Restoril 15 mg take 1-2 nightly for insomnia as needed #30 with no refills.  Discussed abuse potential and controlled substance as of this medication.  Started patient trying to clean first as a dog and symptoms pharmacy he picked it up that if it was not working into the weekend he could add on the Restoril.  We will follow-up with Garret for further treatment if  symptoms persist.  I will contact him with CT results are received will get in for the CT scan tomorrow.

## 2020-09-11 ENCOUNTER — APPOINTMENT (OUTPATIENT)
Dept: CT IMAGING | Facility: HOSPITAL | Age: 65
End: 2020-09-11

## 2020-09-12 ENCOUNTER — APPOINTMENT (OUTPATIENT)
Dept: GENERAL RADIOLOGY | Facility: HOSPITAL | Age: 65
End: 2020-09-12

## 2020-09-12 ENCOUNTER — HOSPITAL ENCOUNTER (EMERGENCY)
Facility: HOSPITAL | Age: 65
Discharge: HOME | End: 2020-09-12
Attending: EMERGENCY MEDICINE | Admitting: EMERGENCY MEDICINE

## 2020-09-12 ENCOUNTER — APPOINTMENT (OUTPATIENT)
Dept: CT IMAGING | Facility: HOSPITAL | Age: 65
End: 2020-09-12

## 2020-09-12 VITALS
RESPIRATION RATE: 20 BRPM | DIASTOLIC BLOOD PRESSURE: 65 MMHG | HEART RATE: 65 BPM | WEIGHT: 150 LBS | OXYGEN SATURATION: 97 % | SYSTOLIC BLOOD PRESSURE: 107 MMHG | BODY MASS INDEX: 22.22 KG/M2 | TEMPERATURE: 96.2 F | HEIGHT: 69 IN

## 2020-09-12 DIAGNOSIS — S06.0X0A CONCUSSION WITHOUT LOSS OF CONSCIOUSNESS, INITIAL ENCOUNTER: ICD-10-CM

## 2020-09-12 DIAGNOSIS — R05.9 COUGH: Primary | ICD-10-CM

## 2020-09-12 DIAGNOSIS — Z20.822 PERSON UNDER INVESTIGATION FOR COVID-19: ICD-10-CM

## 2020-09-12 DIAGNOSIS — J02.9 SORE THROAT: ICD-10-CM

## 2020-09-12 LAB
ALBUMIN SERPL-MCNC: 4.3 G/DL (ref 3.5–5.2)
ALBUMIN/GLOB SERPL: 1.3 G/DL
ALP SERPL-CCNC: 88 U/L (ref 39–117)
ALT SERPL W P-5'-P-CCNC: 37 U/L (ref 1–41)
ANION GAP SERPL CALCULATED.3IONS-SCNC: 18 MMOL/L (ref 5–15)
AST SERPL-CCNC: 66 U/L (ref 1–40)
BASOPHILS # BLD AUTO: 0.09 10*3/MM3 (ref 0–0.2)
BASOPHILS NFR BLD AUTO: 1.1 % (ref 0–1.5)
BILIRUB SERPL-MCNC: 0.6 MG/DL (ref 0–1.2)
BUN SERPL-MCNC: 9 MG/DL (ref 8–23)
BUN/CREAT SERPL: 11.1 (ref 7–25)
CALCIUM SPEC-SCNC: 8.4 MG/DL (ref 8.6–10.5)
CHLORIDE SERPL-SCNC: 99 MMOL/L (ref 98–107)
CO2 SERPL-SCNC: 20 MMOL/L (ref 22–29)
CREAT SERPL-MCNC: 0.81 MG/DL (ref 0.76–1.27)
DEPRECATED RDW RBC AUTO: 43.2 FL (ref 37–54)
EOSINOPHIL # BLD AUTO: 0.32 10*3/MM3 (ref 0–0.4)
EOSINOPHIL NFR BLD AUTO: 3.8 % (ref 0.3–6.2)
ERYTHROCYTE [DISTWIDTH] IN BLOOD BY AUTOMATED COUNT: 11.9 % (ref 12.3–15.4)
GFR SERPL CREATININE-BSD FRML MDRD: 96 ML/MIN/1.73
GLOBULIN UR ELPH-MCNC: 3.2 GM/DL
GLUCOSE SERPL-MCNC: 70 MG/DL (ref 65–99)
HCT VFR BLD AUTO: 41.6 % (ref 37.5–51)
HGB BLD-MCNC: 14.2 G/DL (ref 13–17.7)
IMM GRANULOCYTES # BLD AUTO: 0.02 10*3/MM3 (ref 0–0.05)
IMM GRANULOCYTES NFR BLD AUTO: 0.2 % (ref 0–0.5)
LYMPHOCYTES # BLD AUTO: 3.83 10*3/MM3 (ref 0.7–3.1)
LYMPHOCYTES NFR BLD AUTO: 45.5 % (ref 19.6–45.3)
MCH RBC QN AUTO: 33.3 PG (ref 26.6–33)
MCHC RBC AUTO-ENTMCNC: 34.1 G/DL (ref 31.5–35.7)
MCV RBC AUTO: 97.4 FL (ref 79–97)
MONOCYTES # BLD AUTO: 0.72 10*3/MM3 (ref 0.1–0.9)
MONOCYTES NFR BLD AUTO: 8.6 % (ref 5–12)
NEUTROPHILS NFR BLD AUTO: 3.44 10*3/MM3 (ref 1.7–7)
NEUTROPHILS NFR BLD AUTO: 40.8 % (ref 42.7–76)
NRBC BLD AUTO-RTO: 0 /100 WBC (ref 0–0.2)
PLATELET # BLD AUTO: 146 10*3/MM3 (ref 140–450)
PMV BLD AUTO: 9.7 FL (ref 6–12)
POTASSIUM SERPL-SCNC: 3.9 MMOL/L (ref 3.5–5.2)
PROT SERPL-MCNC: 7.5 G/DL (ref 6–8.5)
RBC # BLD AUTO: 4.27 10*6/MM3 (ref 4.14–5.8)
SODIUM SERPL-SCNC: 137 MMOL/L (ref 136–145)
TROPONIN T SERPL-MCNC: <0.01 NG/ML (ref 0–0.03)
WBC # BLD AUTO: 8.42 10*3/MM3 (ref 3.4–10.8)

## 2020-09-12 PROCEDURE — 93005 ELECTROCARDIOGRAM TRACING: CPT | Performed by: EMERGENCY MEDICINE

## 2020-09-12 PROCEDURE — 71045 X-RAY EXAM CHEST 1 VIEW: CPT

## 2020-09-12 PROCEDURE — 99283 EMERGENCY DEPT VISIT LOW MDM: CPT

## 2020-09-12 PROCEDURE — 70450 CT HEAD/BRAIN W/O DYE: CPT

## 2020-09-12 PROCEDURE — 84484 ASSAY OF TROPONIN QUANT: CPT | Performed by: EMERGENCY MEDICINE

## 2020-09-12 PROCEDURE — U0004 COV-19 TEST NON-CDC HGH THRU: HCPCS | Performed by: EMERGENCY MEDICINE

## 2020-09-12 PROCEDURE — 80053 COMPREHEN METABOLIC PANEL: CPT | Performed by: EMERGENCY MEDICINE

## 2020-09-12 PROCEDURE — 85025 COMPLETE CBC W/AUTO DIFF WBC: CPT | Performed by: EMERGENCY MEDICINE

## 2020-09-12 RX ORDER — PREDNISONE 50 MG/1
50 TABLET ORAL DAILY
Qty: 5 TABLET | Refills: 0 | Status: SHIPPED | OUTPATIENT
Start: 2020-09-12 | End: 2020-09-17

## 2020-09-12 RX ORDER — AZITHROMYCIN 500 MG/1
500 TABLET, FILM COATED ORAL DAILY
Qty: 5 TABLET | Refills: 0 | Status: SHIPPED | OUTPATIENT
Start: 2020-09-12 | End: 2020-09-17

## 2020-09-13 LAB — SARS-COV-2 RNA NOSE QL NAA+PROBE: NOT DETECTED

## 2020-09-13 NOTE — ED PROVIDER NOTES
EMERGENCY DEPARTMENT ENCOUNTER      Pt Name: Stepan Gaines  MRN: [id number]  YOB: 1955  Date of evaluation: 9/12/2020  Provider: Alex Tarango MD    CHIEF COMPLAINT       Chief Complaint   Patient presents with   • Sore Throat   • multiple complaints         HISTORY OF PRESENT ILLNESS  (Location/Symptom, Timing/Onset, Context/Setting, Quality, Duration, Modifying Factors, Severity.)   Stpean Gaines is a 64 y.o. male who presents to the emergency department due to concern for COVID as he was recently exposed to somebody known to have it.  States that he has had some scratchiness in his throat, mild dry cough, but no fever, chills, headache, abdominal pain, vomiting, diarrhea, urinary symptoms, or back pain.  He is a smoker but has no other known chronic medical conditions.  He denies any IV drug use.  He describes his symptoms very mild.  States that he simply came to be tested.      Nursing notes were reviewed.    REVIEW OF SYSTEMS    (2-9 systems for level 4, 10 or more for level 5)   ROS:  General:  No fevers, no chills, no weakness  Cardiovascular:  No chest pain, no palpitations  Respiratory: Cough  Gastrointestinal:  No pain, no nausea, no vomiting, no diarrhea  Musculoskeletal:  No muscle pain, no joint pain  Skin:  No rash, no easy bruising  Neurologic:  No speech problems, no headache, no extremity numbness, no extremity tingling, no extremity weakness  Psychiatric:  No anxiety  Genitourinary:  No dysuria, no hematuria    Except as noted above the remainder of the review of systems was reviewed and negative.       PAST MEDICAL HISTORY     Past Medical History:   Diagnosis Date   • A-fib (CMS/[facility]ly [facility])    • Anxiety    • Arthritis    • Cancer (CMS/Formerly [facility])     melanoma back   • Cataract    • COPD (chronic obstructive pulmonary disease) (CMS/Formerly [facility])    • Fainting    • Hypertension          SURGICAL HISTORY       Past Surgical History:   Procedure Laterality Date   • CARDIAC ELECTROPHYSIOLOGY  PROCEDURE N/A 6/10/2020    Procedure: EP/Ablation Atypical AFL w ICE & SJM or Carto mapping, ASAP, pt has no meds that need held;  Surgeon: Beto Solomon DO;  Location: [facility] INVASIVE LOCATION;  Service: Cardiology;  Laterality: N/A;   • CATARACT EXTRACTION      right eye   • ELBOW ARTHROSCOPY Left     left elbow twice   • KNEE ARTHROSCOPY Right    • SHOULDER ARTHROSCOPY Right     left and right but had left shoulder surgery twice   • TOE ARTHROPLASTY Right          CURRENT MEDICATIONS     No current facility-administered medications for this encounter.     Current Outpatient Medications:   •  amitriptyline (ELAVIL) 25 MG tablet, Take 1 tablet by mouth Every Night., Disp: 30 tablet, Rfl: 1  •  azithromycin (ZITHROMAX) 500 MG tablet, Take 1 tablet by mouth Daily for 5 days., Disp: 5 tablet, Rfl: 0  •  fluocinonide (LIDEX) 0.05 % external solution, Apply  topically to the appropriate area as directed 2 (Two) Times a Day., Disp: 20 mL, Rfl: 11  •  gentamicin (GARAMYCIN) 0.3 % ophthalmic solution, Apply 1 drop to eye(s) as directed by provider 4 (Four) Times a Day., Disp: 15 mL, Rfl: 0  •  hydrOXYzine pamoate (Vistaril) 50 MG capsule, Take 1 capsule by mouth Every Night., Disp: 30 capsule, Rfl: 1  •  metoprolol tartrate (LOPRESSOR) 50 MG tablet, Take 1 tablet by mouth Every 12 (Twelve) Hours. May take extra tablet as needed for tachycardia. Further refills per Dr. Solomon, Disp: 30 tablet, Rfl: 11  •  predniSONE (DELTASONE) 50 MG tablet, Take 1 tablet by mouth Daily for 5 days., Disp: 5 tablet, Rfl: 0  •  triamterene-hydrochlorothiazide (MAXZIDE-25) 37.5-25 MG per tablet, Take 1 tablet by mouth As Needed (Daily as  needed for systolic BP greater than 140 mmhg.)., Disp: 30 tablet, Rfl: 6    ALLERGIES     Patient has no known allergies.    FAMILY HISTORY       Family History   Problem Relation Age of Onset   • Hyperlipidemia Mother    • Lung cancer Father    • Diabetes Father    • Kidney failure Father            SOCIAL HISTORY       Social History     Socioeconomic History   • Marital status:      Spouse name: Not on file   • Number of children: Not on file   • Years of education: Not on file   • Highest education level: Not on file   Tobacco Use   • Smoking status: Current Every Day Smoker     Packs/day: 0.50     Types: Cigarettes   • Smokeless tobacco: Never Used   Substance and Sexual Activity   • Alcohol use: Yes     Comment: 7-10 drinks/week   • Drug use: Never   • Sexual activity: Defer   Social History Narrative    caffeine use: 1-2 servings of coffee in the am occasionally coke          PHYSICAL EXAM    (up to 7 for level 4, 8 or more for level 5)     Vitals:    09/12/20 2023 09/12/20 2024 09/12/20 2030 09/12/20 2100   BP: 127/80  113/69 107/65   BP Location:       Patient Position:       Pulse:       Resp:       Temp:       TempSrc:       SpO2:  95% 93% 97%   Weight:       Height:           Physical Exam  General: Awake, alert, no acute distress.  HEENT: Conjunctiva normal.  Neck: Trachea midline.  Cardiac: Heart regular rate, rhythm, no murmurs, rubs, or gallops  Lungs: Lungs are clear to auscultation, there is no wheezing, rhonchi, or rales. There is no use of accessory muscles.  Chest wall: There is no tenderness to palpation over the chest wall or over ribs  Abdomen: Abdomen is soft, nontender, nondistended. There is no firm or pulsatile masses, no rebound rigidity or guarding.   Musculoskeletal: No deformity.  Neuro: Alert and oriented x 4.  Dermatology: Skin is warm and dry  Psych: Mentation is grossly normal, cognition is grossly normal. Affect is appropriate.        DIAGNOSTIC RESULTS     EKG: All EKG's are interpreted by the Emergency Department Physician who either signs or Co-signs this chart in the absence of a cardiologist.    Sinus rhythm rate of 62, no acute ST segment or T wave changes, normal intervals, no ectopy    RADIOLOGY:   Non-plain film images such as CT, Ultrasound and MRI are  read by the radiologist. Plain radiographic images are visualized and preliminarily interpreted by the emergency physician with the below findings:      [x] Radiologist's Report Reviewed:  XR Chest 1 View   Final Result   1. Negative chest.      Signer Name: Mamadou Rosa MD    Signed: 9/12/2020 9:51 PM    Workstation Name: [facility]      CT Head Without Contrast   Final Result   Senescent changes without acute abnormality.               Signer Name: Mamadou Rosa MD    Signed: 9/12/2020 10:09 PM    Workstation Name: [facility]              LABS:    I have reviewed and interpreted all of the currently available lab results from this visit (if applicable):  Results for orders placed or performed during the hospital encounter of 09/12/20   Comprehensive Metabolic Panel    Specimen: Blood   Result Value Ref Range    Glucose 70 65 - 99 mg/dL    BUN 9 8 - 23 mg/dL    Creatinine 0.81 0.76 - 1.27 mg/dL    Sodium 137 136 - 145 mmol/L    Potassium 3.9 3.5 - 5.2 mmol/L    Chloride 99 98 - 107 mmol/L    CO2 20.0 (L) 22.0 - 29.0 mmol/L    Calcium 8.4 (L) 8.6 - 10.5 mg/dL    Total Protein 7.5 6.0 - 8.5 g/dL    Albumin 4.30 3.50 - 5.20 g/dL    ALT (SGPT) 37 1 - 41 U/L    AST (SGOT) 66 (H) 1 - 40 U/L    Alkaline Phosphatase 88 39 - 117 U/L    Total Bilirubin 0.6 0.0 - 1.2 mg/dL    eGFR Non African Amer 96 >60 mL/min/1.73    Globulin 3.2 gm/dL    A/G Ratio 1.3 g/dL    BUN/Creatinine Ratio 11.1 7.0 - 25.0    Anion Gap 18.0 (H) 5.0 - 15.0 mmol/L   Troponin    Specimen: Blood   Result Value Ref Range    Troponin T <0.010 0.000 - 0.030 ng/mL   CBC Auto Differential    Specimen: Blood   Result Value Ref Range    WBC 8.42 3.40 - 10.80 10*3/mm3    RBC 4.27 4.14 - 5.80 10*6/mm3    Hemoglobin 14.2 13.0 - 17.7 g/dL    Hematocrit 41.6 37.5 - 51.0 %    MCV 97.4 (H) 79.0 - 97.0 fL    MCH 33.3 (H) 26.6 - 33.0 pg    MCHC 34.1 31.5 - 35.7 g/dL    RDW 11.9 (L) 12.3  - 15.4 %    RDW-SD 43.2 37.0 - 54.0 fl    MPV 9.7 6.0 - 12.0 fL    Platelets 146 140 - 450 10*3/mm3    Neutrophil % 40.8 (L) 42.7 - 76.0 %    Lymphocyte % 45.5 (H) 19.6 - 45.3 %    Monocyte % 8.6 5.0 - 12.0 %    Eosinophil % 3.8 0.3 - 6.2 %    Basophil % 1.1 0.0 - 1.5 %    Immature Grans % 0.2 0.0 - 0.5 %    Neutrophils, Absolute 3.44 1.70 - 7.00 10*3/mm3    Lymphocytes, Absolute 3.83 (H) 0.70 - 3.10 10*3/mm3    Monocytes, Absolute 0.72 0.10 - 0.90 10*3/mm3    Eosinophils, Absolute 0.32 0.00 - 0.40 10*3/mm3    Basophils, Absolute 0.09 0.00 - 0.20 10*3/mm3    Immature Grans, Absolute 0.02 0.00 - 0.05 10*3/mm3    nRBC 0.0 0.0 - 0.2 /100 WBC        All other labs were within normal range or not returned as of this dictation.      EMERGENCY DEPARTMENT COURSE and DIFFERENTIAL DIAGNOSIS/MDM:   Vitals:    Vitals:    09/12/20 2023 09/12/20 2024 09/12/20 2030 09/12/20 2100   BP: 127/80  113/69 107/65   BP Location:       Patient Position:       Pulse:       Resp:       Temp:       TempSrc:       SpO2:  95% 93% 97%   Weight:       Height:           ED Course as of Sep 13 0017   Sat Sep 12, 2020   2205 CXR personally reviewed and negative for acute process    [NS]   2236 All results reviewed and reviewed with the patient. I have discussed isolation, return precautions, and follow up instructions.    [NS]      ED Course User Index  [NS] Alex Tarango MD       Patient well-appearing throughout the duration of his stay in the emergency department.  I performed CT head as he had head injury 1 week ago and wanted to ensure he had no acute intracranial injury and this was noted to be negative.  His chest x-ray demonstrates no evidence of pneumonia, thorax, or other acute thoracic process.  Laboratory evaluation demonstrates no evidence of significant leukocytosis or electrolyte derangement.  Do not feel this represents atypical ACS and patient's ECG and troponin demonstrate no evidence of myocardial injury.  COVID swab was  performed and patient will be discharged with instructions for self isolation.    I had a discussion with the patient/family regarding diagnosis, diagnostic results, treatment plan, and medications.  The patient/family indicated understanding of these instructions.  I spent adequate time at the bedside proceeding discharge necessary to personally discuss the aftercare instructions, giving patient education, providing explanations of the results of our evaluations/findings, and my decision making to assure that the patient/family understand the plan of care.  Time was allotted to answer questions at that time and throughout the ED course.  Emphasis was placed on timely follow-up after discharge.  I also discussed the potential for the development of an acute emergent condition requiring further evaluation, admission, or even surgical intervention. I discussed that we found nothing during the visit today indicating the need for further workup, admission, or the presence of an unstable medical condition.  I encouraged the patient to return to the emergency department immediately for ANY concerns, worsening, new complaints, or if symptoms persist and unable to seek follow-up in a timely fashion.  The patient/family expressed understanding and agreement with this plan.  The patient will follow-up with their PCP in 1-2 days for reevaluation.         FINAL IMPRESSION      1. Cough    2. Sore throat    3. Concussion without loss of consciousness, initial encounter    4. Person under investigation for COVID-19          DISPOSITION/PLAN     ED Disposition     ED Disposition Condition Comment    Discharge Stable           PATIENT REFERRED TO:  John Argueta PA  [address]  [place] [id number]  [phone]    In 1 week      [facility] Emergency Department  [address] [address] [phone]    If symptoms worsen      DISCHARGE MEDICATIONS:      Medication List      START taking these medications    azithromycin 500 MG tablet  Commonly known as: ZITHROMAX  Take 1 tablet by mouth Daily for 5 days.     predniSONE 50 MG tablet  Commonly known as: DELTASONE  Take 1 tablet by mouth Daily for 5 days.        CONTINUE taking these medications    amitriptyline 25 MG tablet  Commonly known as: ELAVIL  Take 1 tablet by mouth Every Night.     fluocinonide 0.05 % external solution  Commonly known as: LIDEX  Apply  topically to the appropriate area as directed 2 (Two) Times a Day.     gentamicin 0.3 % ophthalmic solution  Commonly known as: GARAMYCIN  Apply 1 drop to eye(s) as directed by provider 4 (Four) Times a Day.     hydrOXYzine pamoate 50 MG capsule  Commonly known as: Vistaril  Take 1 capsule by mouth Every Night.     metoprolol tartrate 50 MG tablet  Commonly known as: LOPRESSOR  Take 1 tablet by mouth Every 12 (Twelve) Hours. May take extra tablet as needed for tachycardia. Further refills per Dr. Solomon     triamterene-hydrochlorothiazide 37.5-25 MG per tablet  Commonly known as: MAXZIDE-25  Take 1 tablet by mouth As Needed (Daily as  needed for systolic BP greater than 140 mmhg.).           Where to Get Your Medications      You can get these medications from any pharmacy    Bring a paper prescription for each of these medications  · azithromycin 500 MG tablet  · predniSONE 50 MG tablet             Comment: Please note this report has been produced using speech recognition software.      Alex Tarango MD  Attending Emergency Physician               Alex Tarango MD  09/13/20 0017

## 2020-09-14 ENCOUNTER — TELEPHONE (OUTPATIENT)
Dept: EMERGENCY DEPT | Facility: HOSPITAL | Age: 65
End: 2020-09-14

## 2020-09-30 PROCEDURE — 93005 ELECTROCARDIOGRAM TRACING: CPT

## 2020-09-30 PROCEDURE — 99284 EMERGENCY DEPT VISIT MOD MDM: CPT

## 2020-09-30 PROCEDURE — 99285 EMERGENCY DEPT VISIT HI MDM: CPT

## 2020-09-30 RX ORDER — ASPIRIN 81 MG/1
324 TABLET, CHEWABLE ORAL ONCE
Status: DISCONTINUED | OUTPATIENT
Start: 2020-09-30 | End: 2020-10-01 | Stop reason: HOSPADM

## 2020-09-30 RX ORDER — SODIUM CHLORIDE 0.9 % (FLUSH) 0.9 %
10 SYRINGE (ML) INJECTION AS NEEDED
Status: DISCONTINUED | OUTPATIENT
Start: 2020-09-30 | End: 2020-10-01 | Stop reason: HOSPADM

## 2020-10-01 ENCOUNTER — HOSPITAL ENCOUNTER (EMERGENCY)
Facility: HOSPITAL | Age: 65
Discharge: HOME | End: 2020-10-01
Attending: EMERGENCY MEDICINE | Admitting: EMERGENCY MEDICINE

## 2020-10-01 ENCOUNTER — APPOINTMENT (OUTPATIENT)
Dept: GENERAL RADIOLOGY | Facility: HOSPITAL | Age: 65
End: 2020-10-01

## 2020-10-01 VITALS
HEART RATE: 79 BPM | WEIGHT: 148 LBS | SYSTOLIC BLOOD PRESSURE: 115 MMHG | RESPIRATION RATE: 16 BRPM | HEIGHT: 69 IN | DIASTOLIC BLOOD PRESSURE: 76 MMHG | BODY MASS INDEX: 21.92 KG/M2 | OXYGEN SATURATION: 98 % | TEMPERATURE: 98 F

## 2020-10-01 DIAGNOSIS — S50.02XA CONTUSION OF LEFT ELBOW, INITIAL ENCOUNTER: Primary | ICD-10-CM
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
## 2020-10-01 DIAGNOSIS — S51.012A SKIN TEAR OF LEFT ELBOW WITHOUT COMPLICATION, INITIAL ENCOUNTER: ICD-10-CM

## 2020-10-01 LAB
ALBUMIN SERPL-MCNC: 4.3 G/DL (ref 3.5–5.2)
ALBUMIN/GLOB SERPL: 1.3 G/DL
ALP SERPL-CCNC: 106 U/L (ref 39–117)
ALT SERPL W P-5'-P-CCNC: 34 U/L (ref 1–41)
ANION GAP SERPL CALCULATED.3IONS-SCNC: 16 MMOL/L (ref 5–15)
AST SERPL-CCNC: 52 U/L (ref 1–40)
BASOPHILS # BLD AUTO: 0.08 10*3/MM3 (ref 0–0.2)
BASOPHILS NFR BLD AUTO: 0.8 % (ref 0–1.5)
BILIRUB SERPL-MCNC: 0.4 MG/DL (ref 0–1.2)
BUN SERPL-MCNC: 7 MG/DL (ref 8–23)
BUN/CREAT SERPL: 8.8 (ref 7–25)
CALCIUM SPEC-SCNC: 8.8 MG/DL (ref 8.6–10.5)
CHLORIDE SERPL-SCNC: 105 MMOL/L (ref 98–107)
CO2 SERPL-SCNC: 23 MMOL/L (ref 22–29)
CREAT SERPL-MCNC: 0.8 MG/DL (ref 0.76–1.27)
DEPRECATED RDW RBC AUTO: 45.3 FL (ref 37–54)
EOSINOPHIL # BLD AUTO: 0.23 10*3/MM3 (ref 0–0.4)
EOSINOPHIL NFR BLD AUTO: 2.2 % (ref 0.3–6.2)
ERYTHROCYTE [DISTWIDTH] IN BLOOD BY AUTOMATED COUNT: 12.4 % (ref 12.3–15.4)
GFR SERPL CREATININE-BSD FRML MDRD: 98 ML/MIN/1.73
GLOBULIN UR ELPH-MCNC: 3.2 GM/DL
GLUCOSE SERPL-MCNC: 87 MG/DL (ref 65–99)
HCT VFR BLD AUTO: 40.5 % (ref 37.5–51)
HGB BLD-MCNC: 13.4 G/DL (ref 13–17.7)
HOLD SPECIMEN: NORMAL
HOLD SPECIMEN: NORMAL
IMM GRANULOCYTES # BLD AUTO: 0.04 10*3/MM3 (ref 0–0.05)
IMM GRANULOCYTES NFR BLD AUTO: 0.4 % (ref 0–0.5)
LIPASE SERPL-CCNC: 52 U/L (ref 13–60)
LYMPHOCYTES # BLD AUTO: 4.37 10*3/MM3 (ref 0.7–3.1)
LYMPHOCYTES NFR BLD AUTO: 42.1 % (ref 19.6–45.3)
MCH RBC QN AUTO: 33.1 PG (ref 26.6–33)
MCHC RBC AUTO-ENTMCNC: 33.1 G/DL (ref 31.5–35.7)
MCV RBC AUTO: 100 FL (ref 79–97)
MONOCYTES # BLD AUTO: 0.79 10*3/MM3 (ref 0.1–0.9)
MONOCYTES NFR BLD AUTO: 7.6 % (ref 5–12)
NEUTROPHILS NFR BLD AUTO: 4.88 10*3/MM3 (ref 1.7–7)
NEUTROPHILS NFR BLD AUTO: 46.9 % (ref 42.7–76)
NRBC BLD AUTO-RTO: 0 /100 WBC (ref 0–0.2)
NT-PROBNP SERPL-MCNC: 188 PG/ML (ref 0–900)
PLATELET # BLD AUTO: 149 10*3/MM3 (ref 140–450)
PMV BLD AUTO: 10.2 FL (ref 6–12)
POTASSIUM SERPL-SCNC: 3.8 MMOL/L (ref 3.5–5.2)
PROT SERPL-MCNC: 7.5 G/DL (ref 6–8.5)
RBC # BLD AUTO: 4.05 10*6/MM3 (ref 4.14–5.8)
SODIUM SERPL-SCNC: 144 MMOL/L (ref 136–145)
TROPONIN T SERPL-MCNC: <0.01 NG/ML (ref 0–0.03)
TROPONIN T SERPL-MCNC: <0.01 NG/ML (ref 0–0.03)
WBC # BLD AUTO: 10.39 10*3/MM3 (ref 3.4–10.8)
WHOLE BLOOD HOLD SPECIMEN: NORMAL
WHOLE BLOOD HOLD SPECIMEN: NORMAL

## 2020-10-01 PROCEDURE — 73070 X-RAY EXAM OF ELBOW: CPT

## 2020-10-01 PROCEDURE — 80053 COMPREHEN METABOLIC PANEL: CPT

## 2020-10-01 PROCEDURE — 83690 ASSAY OF LIPASE: CPT

## 2020-10-01 PROCEDURE — 85025 COMPLETE CBC W/AUTO DIFF WBC: CPT

## 2020-10-01 PROCEDURE — 93005 ELECTROCARDIOGRAM TRACING: CPT

## 2020-10-01 PROCEDURE — 83880 ASSAY OF NATRIURETIC PEPTIDE: CPT

## 2020-10-01 PROCEDURE — 84484 ASSAY OF TROPONIN QUANT: CPT

## 2020-10-01 PROCEDURE — 71045 X-RAY EXAM CHEST 1 VIEW: CPT

## 2020-10-01 RX ORDER — OXYCODONE AND ACETAMINOPHEN 10; 325 MG/1; MG/1
1 TABLET ORAL ONCE
Status: COMPLETED | OUTPATIENT
Start: 2020-10-01 | End: 2020-10-01

## 2020-10-01 RX ORDER — HYDROCODONE BITARTRATE AND ACETAMINOPHEN 5; 325 MG/1; MG/1
1 TABLET ORAL EVERY 4 HOURS PRN
Qty: 8 TABLET | Refills: 0 | Status: SHIPPED | OUTPATIENT
Start: 2020-10-01 | End: 2020-12-01

## 2020-10-01 RX ORDER — CYCLOBENZAPRINE HCL 10 MG
10 TABLET ORAL 3 TIMES DAILY PRN
Qty: 12 TABLET | Refills: 0 | Status: SHIPPED | OUTPATIENT
Start: 2020-10-01 | End: 2020-12-01

## 2020-10-01 RX ADMIN — OXYCODONE HYDROCHLORIDE AND ACETAMINOPHEN 1 TABLET: 10; 325 TABLET ORAL at 04:29

## 2020-10-04 NOTE — ED PROVIDER NOTES
Subjective   Patient is a pleasant 64-year-old male who presents today following a fall off of his bed.  He states that he sleeps on the edge of his bed and his dog sleeps in the bed also.  The dog pushed him off the edge resulting in an injury to the left elbow.  He notes significant pain in the left elbow since the fall and bleeding from this same location.  He denies other injuries from the fall off the bed.  Denies head trauma.  Denies headache, chest pain, shortness of breath, abdominal pain, nausea, vomiting, diarrhea, loss of taste or smell, or coronavirus exposure.  He does state that he has had some intermittent palpitations over the past several days.  He has history of atrial fibrillation which was controlled with ablation in the past.  Regarding the palpitations, he does note that he was more active from an exertion standpoint as he recently met a new female partner.  His wife unfortunately passed away at the beginning of this year.    He denies chest pain associated with these spells.  He denies lightheadedness or near syncope.  He denies other associated symptoms as noted above.      Fall  Mechanism of injury: fall    Injury location:  Shoulder/arm  Shoulder/arm injury location:  L elbow  Incident location:  Home  Arrived directly from scene: yes    Fall:     Fall occurred:  From a bed    Impact surface:  Hard floor    Point of impact: Left arm/elbow.    Entrapped after fall: no    Suspicion of alcohol use: no    Suspicion of drug use: no    Prior to arrival data:     Bystander interventions:  None    Patient ambulatory at scene: no      Blood loss:  None    Responsiveness at scene:  Alert    Orientation at scene:  Person, place, situation and time    Loss of consciousness: no      Amnesic to event: no      Airway interventions:  None  Associated symptoms: no abdominal pain, no back pain, no blindness, no chest pain, no difficulty breathing, no headaches, no loss of consciousness, no nausea, no neck  pain and no vomiting        Review of Systems   Constitutional: Negative for fever.   Eyes: Negative for blindness.   Cardiovascular: Negative for chest pain.   Gastrointestinal: Negative for abdominal pain, nausea and vomiting.   Musculoskeletal: Negative for back pain and neck pain.   Neurological: Negative for loss of consciousness and headaches.   All other systems reviewed and are negative.      Past Medical History:   Diagnosis Date   • A-fib (CMS/HCC)    • Anxiety    • Arthritis    • Cancer (CMS/HCC)     melanoma back   • Cataract    • COPD (chronic obstructive pulmonary disease) (CMS/HCC)    • Fainting    • Hypertension        No Known Allergies    Past Surgical History:   Procedure Laterality Date   • CARDIAC ELECTROPHYSIOLOGY PROCEDURE N/A 6/10/2020    Procedure: EP/Ablation Atypical AFL w ICE & SJM or Carto mapping, ASAP, pt has no meds that need held;  Surgeon: Beto Solomon DO;  Location: [facility] INVASIVE LOCATION;  Service: Cardiology;  Laterality: N/A;   • CATARACT EXTRACTION      right eye   • ELBOW ARTHROSCOPY Left     left elbow twice   • KNEE ARTHROSCOPY Right    • SHOULDER ARTHROSCOPY Right     left and right but had left shoulder surgery twice   • TOE ARTHROPLASTY Right        Family History   Problem Relation Age of Onset   • Hyperlipidemia Mother    • Lung cancer Father    • Diabetes Father    • Kidney failure Father        Social History     Socioeconomic History   • Marital status:      Spouse name: Not on file   • Number of children: Not on file   • Years of education: Not on file   • Highest education level: Not on file   Tobacco Use   • Smoking status: Current Every Day Smoker     Packs/day: 0.50     Types: Cigarettes   • Smokeless tobacco: Never Used   Substance and Sexual Activity   • Alcohol use: Yes     Comment: 7-10 drinks/week   • Drug use: Never   • Sexual activity: Defer   Social History Narrative    caffeine use: 1-2 servings of coffee in the am occasionally coke             Objective   Physical Exam  Vitals signs and nursing note reviewed.   Constitutional:       Appearance: He is well-developed.   HENT:      Head: Normocephalic and atraumatic.   Eyes:      Extraocular Movements: Extraocular movements intact.      Pupils: Pupils are equal, round, and reactive to light.   Neck:      Musculoskeletal: Normal range of motion.   Cardiovascular:      Rate and Rhythm: Normal rate and regular rhythm.      Heart sounds: Normal heart sounds.   Pulmonary:      Effort: Pulmonary effort is normal. No tachypnea, accessory muscle usage or respiratory distress.      Breath sounds: Normal breath sounds. No stridor. No decreased breath sounds, wheezing, rhonchi or rales.   Chest:      Chest wall: No deformity, tenderness, crepitus or edema.   Abdominal:      General: Bowel sounds are normal.      Palpations: Abdomen is soft.      Tenderness: There is no abdominal tenderness. There is no guarding.   Musculoskeletal: Normal range of motion.      Comments: Mild swelling and skin tear over the extensor surface of the left elbow.  No repairable lacerations appreciated.  Patient does have generalized tenderness to palpation throughout the area of the elbow.  Neurovascularly intact distal to the injury.   Skin:     General: Skin is warm.      Capillary Refill: Capillary refill takes less than 2 seconds.      Comments: Skin tear to left elbow.  The tear is approximately 4 cm in diameter.  The majority of the skin well approximated there is approximately a 2 cm x 1 cm area where the skin appears to have avulsed.   Neurological:      General: No focal deficit present.      Mental Status: He is alert and oriented to person, place, and time.   Psychiatric:         Mood and Affect: Mood normal.         Behavior: Behavior normal.         Procedures           ED Course  ED Course as of Oct 04 0430   Thu Oct 01, 2020   0414 Patient states his tetanus is already up-to-date    [CP]   0428 Patient states he will  "be taking Uber home.    [CP]   Sun Oct 04, 2020   0429 Reassuringly, the patient experienced his palpitations while in the emergency department.  Throughout these episodes of palpitations, which were consistent with the palpitations he described in the HPI, he remained in normal sinus rhythm.  He never had an observed arrhythmia despite palpitations being present at that exact time.    [CP]      ED Course User Index  [CP] Prosper Crow, DO        XR Elbow 2 View Left   Final Result   Negative left elbow.      Signer Name: John Workman MD    Signed: 10/1/2020 3:57 AM    Workstation Name: [employer]     Radiology [facility]      XR Chest 1 View   Final Result   1. No active disease.   2. COPD. The lungs appear clear. No change since 9/12/2020.      Signer Name: John Workman MD    Signed: 10/1/2020 3:56 AM    Workstation Name: [employer]     [facility]        Vitals:    09/30/20 2347 10/01/20 0312 10/01/20 0318 10/01/20 0330   BP: 121/79 132/75  115/76   BP Location: Left arm      Patient Position: Sitting      Pulse: 89 82 79 79   Resp: 12  16    Temp: 98 °F (36.7 °C)      TempSrc: Temporal      SpO2: 95% 97% 97% 98%   Weight: 67.1 kg (148 lb)      Height: 175.3 cm (69\")        Medications   oxyCODONE-acetaminophen (PERCOCET)  MG per tablet 1 tablet (1 tablet Oral Given 10/1/20 0429)     ECG/EMG Results (last 24 hours)     ** No results found for the last 24 hours. **        ECG 12 Lead   Final Result   Test Reason : chest pain   Blood Pressure : **/** mmHG   Vent. Rate : 078 BPM     Atrial Rate : 078 BPM      P-R Int : 174 ms          QRS Dur : 080 ms       QT Int : 408 ms       P-R-T Axes : 070 -34 041 degrees      QTc Int : 465 ms      Normal sinus rhythm   Left axis deviation   Cannot rule out Anterior infarct (cited on or before 30-SEP-2020)   Abnormal ECG   When compared with ECG of 30-SEP-2020 23:53, (Unconfirmed)   QRS axis shifted left   Confirmed " by MD LAUREANO CORY ([id number]) on 10/2/2020 10:43:44 AM      Referred By:  MICHAEL           Confirmed By:BIJU LAUREANO MD      ECG 12 Lead   Final Result   Test Reason : CP   Blood Pressure : **/** mmHG   Vent. Rate : 087 BPM     Atrial Rate : 087 BPM      P-R Int : 162 ms          QRS Dur : 090 ms       QT Int : 378 ms       P-R-T Axes : -03 092 031 degrees      QTc Int : 454 ms      Normal sinus rhythm   Rightward axis   Possible Anterior infarct , age undetermined   Abnormal ECG   When compared with ECG of 12-SEP-2020 20:54,   QRS axis shifted right   Confirmed by MD LAUREANO CORY ([id number]) on 10/2/2020 10:43:26 AM      Referred By:  ASHLEE SHER           Confirmed By:BIJU LAUREANO MD        Results for EMILY MANCIA (MRN [id number]) as of 10/4/2020 04:33   Ref. Range 10/1/2020 00:07   Troponin T Latest Ref Range: 0.000 - 0.030 ng/mL <0.010   proBNP Latest Ref Range: 0.0 - 900.0 pg/mL 188.0   Glucose Latest Ref Range: 65 - 99 mg/dL 87   Sodium Latest Ref Range: 136 - 145 mmol/L 144   Potassium Latest Ref Range: 3.5 - 5.2 mmol/L 3.8   CO2 Latest Ref Range: 22.0 - 29.0 mmol/L 23.0   Chloride Latest Ref Range: 98 - 107 mmol/L 105   Anion Gap Latest Ref Range: 5.0 - 15.0 mmol/L 16.0 (H)   Creatinine Latest Ref Range: 0.76 - 1.27 mg/dL 0.80   BUN Latest Ref Range: 8 - 23 mg/dL 7 (L)   BUN/Creatinine Ratio Latest Ref Range: 7.0 - 25.0  8.8   Calcium Latest Ref Range: 8.6 - 10.5 mg/dL 8.8   eGFR Non  Am Latest Ref Range: >60 mL/min/1.73 98   Alkaline Phosphatase Latest Ref Range: 39 - 117 U/L 106   Total Protein Latest Ref Range: 6.0 - 8.5 g/dL 7.5   ALT (SGPT) Latest Ref Range: 1 - 41 U/L 34   AST (SGOT) Latest Ref Range: 1 - 40 U/L 52 (H)   Total Bilirubin Latest Ref Range: 0.0 - 1.2 mg/dL 0.4   Albumin Latest Ref Range: 3.50 - 5.20 g/dL 4.30   Globulin Latest Units: gm/dL 3.2   A/G Ratio Latest Units: g/dL 1.3   Lipase Latest Ref Range: 13 - 60 U/L 52   WBC Latest Ref Range: 3.40 - 10.80 10*3/mm3 10.39   RBC Latest Ref Range:  4.14 - 5.80 10*6/mm3 4.05 (L)   Hemoglobin Latest Ref Range: 13.0 - 17.7 g/dL 13.4   Hematocrit Latest Ref Range: 37.5 - 51.0 % 40.5   RDW Latest Ref Range: 12.3 - 15.4 % 12.4   MCV Latest Ref Range: 79.0 - 97.0 fL 100.0 (H)   MCH Latest Ref Range: 26.6 - 33.0 pg 33.1 (H)   MCHC Latest Ref Range: 31.5 - 35.7 g/dL 33.1   MPV Latest Ref Range: 6.0 - 12.0 fL 10.2   Platelets Latest Ref Range: 140 - 450 10*3/mm3 149   RDW-SD Latest Ref Range: 37.0 - 54.0 fl 45.3   Neutrophil Rel % Latest Ref Range: 42.7 - 76.0 % 46.9   Lymphocyte Rel % Latest Ref Range: 19.6 - 45.3 % 42.1   Monocyte Rel % Latest Ref Range: 5.0 - 12.0 % 7.6   Eosinophil Rel % Latest Ref Range: 0.3 - 6.2 % 2.2   Basophil Rel % Latest Ref Range: 0.0 - 1.5 % 0.8   Immature Granulocyte Rel % Latest Ref Range: 0.0 - 0.5 % 0.4   Neutrophils Absolute Latest Ref Range: 1.70 - 7.00 10*3/mm3 4.88   Lymphocytes Absolute Latest Ref Range: 0.70 - 3.10 10*3/mm3 4.37 (H)   Monocytes Absolute Latest Ref Range: 0.10 - 0.90 10*3/mm3 0.79   Eosinophils Absolute Latest Ref Range: 0.00 - 0.40 10*3/mm3 0.23   Basophils Absolute Latest Ref Range: 0.00 - 0.20 10*3/mm3 0.08   Immature Grans, Absolute Latest Ref Range: 0.00 - 0.05 10*3/mm3 0.04   nRBC Latest Ref Range: 0.0 - 0.2 /100 WBC 0.0               MDM    Final diagnoses:   Contusion of left elbow, initial encounter   Fall from bed, initial encounter   Skin tear of left elbow without complication, initial encounter            Prosper Crow,   10/04/20 0433

## 2020-10-06 ENCOUNTER — TELEPHONE (OUTPATIENT)
Dept: CARDIOLOGY | Facility: CLINIC | Age: 65
End: 2020-10-06

## 2020-10-06 NOTE — TELEPHONE ENCOUNTER
"Patient called to report that since the weekend when he checks his BP and HR he is having irregular HR at times and for the past several days he has had increased in palpitations. He is very anxious about this. He is currently taking his lopressor 50mg BID and is no longer taking Eliquis.     I asked why he cancelled his referral to sleep medicine and he stated his \"sleep problems cured themselves\". I explained that sleep apnea can be a trigger for arrhythmias and he should reconsider the consultation as you requested he do this at your last visit 7/13/20.   "

## 2020-10-12 NOTE — TELEPHONE ENCOUNTER
Patient notified. He says he will reschedule and keep a log of BP/HR and if irregular in the meantime.

## 2020-10-14 ENCOUNTER — OFFICE VISIT (OUTPATIENT)
Dept: FAMILY MEDICINE CLINIC | Facility: CLINIC | Age: 65
End: 2020-10-14

## 2020-10-14 VITALS
SYSTOLIC BLOOD PRESSURE: 122 MMHG | OXYGEN SATURATION: 99 % | DIASTOLIC BLOOD PRESSURE: 68 MMHG | HEIGHT: 69 IN | WEIGHT: 152 LBS | TEMPERATURE: 97.5 F | HEART RATE: 80 BPM | BODY MASS INDEX: 22.51 KG/M2

## 2020-10-14 DIAGNOSIS — F51.01 PRIMARY INSOMNIA: Primary | ICD-10-CM

## 2020-10-14 PROCEDURE — 99212 OFFICE O/P EST SF 10 MIN: CPT | Performed by: PHYSICIAN ASSISTANT

## 2020-10-14 RX ORDER — TEMAZEPAM 15 MG/1
CAPSULE ORAL
COMMUNITY
Start: 2020-09-14 | End: 2020-12-01 | Stop reason: SDUPTHER

## 2020-10-14 NOTE — PROGRESS NOTES
Subjective   Stepan Gaines is a 64 y.o. male  Insomnia (Follow up on insomnia, rf on Restoril, needing increase on dose )      History of Present Illness  Patient is a pleasant 63-year-old white male presents today for follow-up on insomnia.  Please see previous office notes.  He states the temazepam/Restoril has worked well for him at a dosage of 30 mg.  Patient is able to follow sleep and staying asleep.  With this dosage of temazepam is able to fall sleep and stay asleep throughout the night and wake up without having any grogginess or adverse effect from medication.  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  The following portions of the patient's history were reviewed and updated as appropriate: allergies, current medications, past social history and problem list    Review of Systems   Constitutional: Negative for fatigue and unexpected weight change.   Respiratory: Negative.    Cardiovascular: Negative.    Neurological: Negative for tremors, weakness, light-headedness and headaches.   Psychiatric/Behavioral: Positive for sleep disturbance. Negative for agitation, behavioral problems, confusion, decreased concentration, dysphoric mood and hallucinations. The patient is not nervous/anxious and is not hyperactive.        Objective     Vitals:    10/14/20 1044   BP: 122/68   Pulse: 80   Temp: 97.5 °F (36.4 °C)   SpO2: 99%       Physical Exam  Vitals signs and nursing note reviewed.   Constitutional:       General: He is not in acute distress.     Appearance: Normal appearance. He is well-developed. He is not ill-appearing, toxic-appearing or diaphoretic.   Eyes:      Conjunctiva/sclera: Conjunctivae normal.   Cardiovascular:      Rate and Rhythm: Normal rate and regular rhythm.   Pulmonary:      Effort: Pulmonary effort is normal. No respiratory distress.      Breath sounds: Normal breath sounds.    Neurological:      Mental Status: He is alert and oriented to person, place, and time.      Coordination: Coordination normal.   Psychiatric:         Attention and Perception: He is attentive.         Mood and Affect: Mood normal.         Behavior: Behavior normal.         Thought Content: Thought content normal.         Judgment: Judgment normal.         Assessment/Plan     Diagnoses and all orders for this visit:    1. Primary insomnia (Primary)    Prescription given of temazepam 30 mg nightly #30 with 5 refills.  Discussed abuse potential and controlled substance as of this medication.  Discussed need follow-up in 6 months for recheck and as needed.

## 2020-10-29 ENCOUNTER — TELEPHONE (OUTPATIENT)
Dept: FAMILY MEDICINE CLINIC | Facility: CLINIC | Age: 65
End: 2020-10-29

## 2020-12-01 ENCOUNTER — OFFICE VISIT (OUTPATIENT)
Dept: FAMILY MEDICINE CLINIC | Facility: CLINIC | Age: 65
End: 2020-12-01

## 2020-12-01 ENCOUNTER — CONSULT (OUTPATIENT)
Dept: SLEEP MEDICINE | Facility: HOSPITAL | Age: 65
End: 2020-12-01

## 2020-12-01 VITALS
HEART RATE: 75 BPM | BODY MASS INDEX: 22.51 KG/M2 | DIASTOLIC BLOOD PRESSURE: 70 MMHG | SYSTOLIC BLOOD PRESSURE: 126 MMHG | RESPIRATION RATE: 16 BRPM | HEIGHT: 69 IN | OXYGEN SATURATION: 96 % | WEIGHT: 152 LBS

## 2020-12-01 VITALS
RESPIRATION RATE: 16 BRPM | WEIGHT: 153.4 LBS | TEMPERATURE: 97.3 F | HEIGHT: 69 IN | OXYGEN SATURATION: 98 % | DIASTOLIC BLOOD PRESSURE: 74 MMHG | BODY MASS INDEX: 22.72 KG/M2 | HEART RATE: 80 BPM | SYSTOLIC BLOOD PRESSURE: 126 MMHG

## 2020-12-01 DIAGNOSIS — F51.04 PSYCHOPHYSIOLOGICAL INSOMNIA: Primary | ICD-10-CM

## 2020-12-01 DIAGNOSIS — G47.19 EXCESSIVE DAYTIME SLEEPINESS: ICD-10-CM

## 2020-12-01 DIAGNOSIS — N52.9 ERECTILE DYSFUNCTION, UNSPECIFIED ERECTILE DYSFUNCTION TYPE: ICD-10-CM

## 2020-12-01 DIAGNOSIS — J01.00 ACUTE NON-RECURRENT MAXILLARY SINUSITIS: Primary | ICD-10-CM

## 2020-12-01 PROCEDURE — 99244 OFF/OP CNSLTJ NEW/EST MOD 40: CPT | Performed by: INTERNAL MEDICINE

## 2020-12-01 PROCEDURE — 99214 OFFICE O/P EST MOD 30 MIN: CPT | Performed by: PHYSICIAN ASSISTANT

## 2020-12-01 RX ORDER — TEMAZEPAM 30 MG/1
15 CAPSULE ORAL
COMMUNITY
Start: 2020-11-11 | End: 2021-02-12

## 2020-12-01 RX ORDER — CEFDINIR 300 MG/1
300 CAPSULE ORAL 2 TIMES DAILY
Qty: 28 CAPSULE | Refills: 0 | Status: SHIPPED | OUTPATIENT
Start: 2020-12-01 | End: 2021-01-18

## 2020-12-01 RX ORDER — FLUTICASONE PROPIONATE 50 MCG
2 SPRAY, SUSPENSION (ML) NASAL DAILY
COMMUNITY
End: 2021-01-18

## 2020-12-01 RX ORDER — PREDNISONE 20 MG/1
20 TABLET ORAL 2 TIMES DAILY
Qty: 14 TABLET | Refills: 0 | Status: SHIPPED | OUTPATIENT
Start: 2020-12-01 | End: 2021-01-18

## 2020-12-01 NOTE — PROGRESS NOTES
Subjective   Stepan Gaines is a 65 y.o. male  Sinusitis  ED    Video visit    History of Present Illness  Patient is a pleasant 65-year-old white male who comes complaint of sinus pressure sinus congestion blowing green-yellow drainage mild sore throat chronic sinus pressure and congestion mild sore throat frontal headache    Patient planes of erectile dysfunction like to try something for erection has trouble getting an erection keeping erection has been gone for long period time  The following portions of the patient's history were reviewed and updated as appropriate: allergies, current medications, past social history and problem list    Review of Systems   Constitutional: Negative for appetite change, chills, diaphoresis, fatigue, fever and unexpected weight change.   HENT: Positive for congestion, ear pain, postnasal drip, rhinorrhea and sinus pressure. Negative for sore throat.    Eyes: Positive for pain. Negative for visual disturbance.   Respiratory: Positive for cough. Negative for chest tightness and shortness of breath.    Cardiovascular: Negative for chest pain, palpitations and leg swelling.   Gastrointestinal: Negative for diarrhea, nausea and vomiting.   Endocrine: Negative for polydipsia, polyphagia and polyuria.   Skin: Negative for color change.   Neurological: Positive for headaches. Negative for dizziness, weakness, light-headedness and numbness.   Hematological: Negative for adenopathy.       Objective     Vitals:    12/01/20 0926   BP: 126/74   Pulse: 80   Resp: 16   Temp: 97.3 °F (36.3 °C)   SpO2: 98%       Physical Exam  Vitals signs and nursing note reviewed.   Constitutional:       Appearance: He is well-developed.   HENT:      Head: Normocephalic and atraumatic.      Right Ear: Tympanic membrane and ear canal normal.      Left Ear: Tympanic membrane and ear canal normal.      Nose: Mucosal edema and rhinorrhea present.      Right Sinus: Maxillary sinus tenderness and frontal sinus  tenderness present.      Left Sinus: Maxillary sinus tenderness and frontal sinus tenderness present.      Mouth/Throat:      Pharynx: No oropharyngeal exudate.   Eyes:      Pupils: Pupils are equal, round, and reactive to light.   Cardiovascular:      Rate and Rhythm: Normal rate and regular rhythm.   Pulmonary:      Effort: Pulmonary effort is normal.      Breath sounds: Normal breath sounds.         Assessment/Plan     Diagnoses and all orders for this visit:    1. Acute non-recurrent maxillary sinusitis (Primary)  -     cefdinir (OMNICEF) 300 MG capsule; Take 1 capsule by mouth 2 (Two) Times a Day.  Dispense: 28 capsule; Refill: 0  -     predniSONE (DELTASONE) 20 MG tablet; Take 1 tablet by mouth 2 (Two) Times a Day.  Dispense: 14 tablet; Refill: 0    2. Erectile dysfunction, unspecified erectile dysfunction type    Sildenafil 100 mg 1 p.o. as directed dispense 30    Follow-up if no better  Answers for HPI/ROS submitted by the patient on 12/1/2020   Have you had these symptoms before?: Yes  How long have you been having these symptoms?: Greater than 2 weeks  What is the primary reason for your visit?: Other

## 2020-12-01 NOTE — PROGRESS NOTES
"  Stepan Gaines is a 65 y.o. male.   Chief Complaint   Patient presents with   • Sleeping Problem       HPI     65 y.o. male seen in consultation at the request of Beto Solomon DO for evaluation of the above.     He has a 2-year history of sleeping difficulties.  He can fairly accurately trace back his sleeping problems to his wife's recent prolonged illness and subsequent death from lung cancer.  He says that he would frequently awaken to help give her her medications or give her other care and even since her death this pattern has persisted.    To make matters worse, he now is dealing with caring for his mother with significant dementia.  He says he has to sleep \"with one eye open\" to keep her from wandering out of the house at night.    He says that his wife told him in the past that he snores but never described severe or loud snoring or apneas.    He basically goes to bed with the help of temazepam.  He has tried other sleep medications including trazodone and melatonin without effect.  Even the temazepam does not have much effect other than helping him sleep for several hours.  He will then typically awaken after 3 hours of sleep and then not be able to go back to sleep.  He will then sleep during the day intermittently.    He fairly clearly says that prior to his wife's illness he slept quite well and was not somnolent during the day.    Dr. Solomon was concerned about the possibility of sleep apnea given his atrial flutter.  He has had an RFA  which was successful.    Further details are as follows:    [place] Scale is: 3/24    Estimated average amount of sleep per night: 3 hours plus daytime naps  Number of times he wakes up at night: Multiple  Difficulty falling back asleep: yes  It usually takes variable amounts of time to go to sleep.  He feels sleepy upon waking up: yes  Rotating or night shift work: no    Drowsiness/Sleepiness:  He exhibits the following:  excessive daytime sleepiness  excessive " "daytime fatigue    Snoring/Breathing:  He exhibits the following:  Has been told he snored in the past but has slept alone since the death of his wife.    Reflux:  He describes the following:  none    Narcolepsy:  He exhibits the following:  none    RLS/PLMs:  He describes the following:  none    Insomnia:  He describes the following:  problems initiating sleep at night  frequent awakenings  restless sleep    Parasomnia:  He exhibits the following:  none    Weight:  Weight change in the last year:  Stable      The patient's relevant past medical, surgical, family, and social history reviewed and updated in Epic as appropriate.    Current medications are:   Current Outpatient Medications:   •  metoprolol tartrate (LOPRESSOR) 50 MG tablet, Take 1 tablet by mouth Every 12 (Twelve) Hours. May take extra tablet as needed for tachycardia. Further refills per Dr. Solomon, Disp: 30 tablet, Rfl: 11  •  temazepam (RESTORIL) 30 MG capsule, Take 15 mg by mouth every night at bedtime., Disp: , Rfl:   •  cefdinir (OMNICEF) 300 MG capsule, Take 1 capsule by mouth 2 (Two) Times a Day., Disp: 28 capsule, Rfl: 0  •  fluticasone (FLONASE) 50 MCG/ACT nasal spray, 2 sprays into the nostril(s) as directed by provider Daily., Disp: , Rfl:   •  predniSONE (DELTASONE) 20 MG tablet, Take 1 tablet by mouth 2 (Two) Times a Day., Disp: 14 tablet, Rfl: 0  •  triamterene-hydrochlorothiazide (MAXZIDE-25) 37.5-25 MG per tablet, Take 1 tablet by mouth As Needed (Daily as  needed for systolic BP greater than 140 mmhg.)., Disp: 30 tablet, Rfl: 6.    Review of Systems    Review of Systems  ROS documented in patient questionnaire ×14 systems.  Reviewed with patient.  Otherwise negative except as noted in HPI.    Physical Exam    Blood pressure 126/70, pulse 75, resp. rate 16, height 175.3 cm (69\"), weight 68.9 kg (152 lb), SpO2 96 %. Body mass index is 22.45 kg/m².    Physical Exam  Vitals signs and nursing note reviewed.   Constitutional:       " Appearance: Normal appearance. He is well-developed.   HENT:      Head: Normocephalic and atraumatic.      Nose: Nose normal.      Mouth/Throat:      Mouth: Mucous membranes are moist.      Pharynx: Oropharynx is clear. No oropharyngeal exudate.      Comments: Class II airway  Eyes:      General: No scleral icterus.     Conjunctiva/sclera: Conjunctivae normal.   Neck:      Thyroid: No thyromegaly.      Trachea: No tracheal deviation.   Cardiovascular:      Rate and Rhythm: Normal rate and regular rhythm.      Heart sounds: No murmur. No friction rub. No gallop.    Pulmonary:      Effort: Pulmonary effort is normal. No respiratory distress.      Breath sounds: No wheezing or rales.   Musculoskeletal: Normal range of motion.         General: No deformity.   Skin:     General: Skin is warm and dry.      Findings: No rash.   Neurological:      Mental Status: He is alert and oriented to person, place, and time.   Psychiatric:         Behavior: Behavior normal.         Thought Content: Thought content normal.         ASSESSMENT:    Patient Active Problem List   Diagnosis   • Syncope   • Palpitations   • Nicotine abuse   • SVT (supraventricular tachycardia) (CMS/HCC)   • Typical atrial flutter (CMS/HCC)   • Psychophysiological insomnia   • Excessive daytime sleepiness       65-year-old male with a complicated sleep presentation.  He fairly clearly has persistent insomnia and this seems to be traceable back to his wife's illness and death.  He said prior to her illness he slept quite well and was not somnolent during the day.  He has problems with sleep onset and sleep maintenance insomnia.  His problems are complicated by caring for his mother who has dementia and who he has to monitor at night.     The evidence for obstructive sleep apnea is not as convincing.  He does not have anybody watching him sleep but when his wife was alive she did not note loud snoring or witnessed apneas.  He did have some mild snoring,  however.    PLAN:    1. I would like to start with sleep hygiene and cognitive behavioral therapy for his persistent insomnia.  I spent a fair amount of time discussing this with him and gave him some options to complete his CBT and this would include online or locally with one of our sleep psychologists.  2. He can continue with temazepam for now to help with sleep onset  3. Could consider further work-up for obstructive sleep apnea in the future if appropriate.  At this point his sleep is so fragmented that would be difficult getting an adequate study.  4. Close sleep center follow-up in several months to assess progress    I have reviewed the results of my evaluation and impression and discussed my recommendations in detail with the patient.    Level of Risk Moderate due to: undiagnosed new problem    Signed by  Moody Bravo MD    December 1, 2020      CC: John Argueta PA Aasbo, Johan D, DO          Answers for HPI/ROS submitted by the patient on 12/1/2020   What is the primary reason for your visit?: Other  Please describe your symptoms.: Sleep issues  Have you had these symptoms before?: Yes  How long have you been having these symptoms?: Greater than 2 weeks  Please list any medications you are currently taking for this condition.: Temazapam 15 mg  Please describe any probable cause for these symptoms. : Stress

## 2020-12-08 ENCOUNTER — HOSPITAL ENCOUNTER (EMERGENCY)
Facility: HOSPITAL | Age: 65
Discharge: HOME | End: 2020-12-08
Attending: EMERGENCY MEDICINE | Admitting: EMERGENCY MEDICINE

## 2020-12-08 ENCOUNTER — APPOINTMENT (OUTPATIENT)
Dept: GENERAL RADIOLOGY | Facility: HOSPITAL | Age: 65
End: 2020-12-08

## 2020-12-08 VITALS
HEART RATE: 77 BPM | OXYGEN SATURATION: 98 % | SYSTOLIC BLOOD PRESSURE: 135 MMHG | HEIGHT: 69 IN | TEMPERATURE: 97.7 F | RESPIRATION RATE: 18 BRPM | BODY MASS INDEX: 21.92 KG/M2 | DIASTOLIC BLOOD PRESSURE: 78 MMHG | WEIGHT: 148 LBS

## 2020-12-08 DIAGNOSIS — Z87.81 HISTORY OF RIB FRACTURE: ICD-10-CM

## 2020-12-08 DIAGNOSIS — S20.212A RIB CONTUSION, LEFT, INITIAL ENCOUNTER: Primary | ICD-10-CM

## 2020-12-08 PROCEDURE — 71045 X-RAY EXAM CHEST 1 VIEW: CPT

## 2020-12-08 PROCEDURE — 93005 ELECTROCARDIOGRAM TRACING: CPT

## 2020-12-08 PROCEDURE — 99282 EMERGENCY DEPT VISIT SF MDM: CPT

## 2020-12-08 NOTE — ED PROVIDER NOTES
Subjective   Pt is a 64 yo male presenting to ED with complaints of rib pain after a fall. PMHx significant for COPD, HTN, Afib, Anxiety, Skin cancer and Arthritis. Pt reports falling yesterday while helping his mother and hit his left chest on bed frame. He has prior hx of multiple rib fractures on the left. He denies dizziness, fever, chills, cough, SOB, N/V/D, abdominal pain, or loss of taste/smell. He denies known Covid exposure. He hasn't taken anything for pain today. He uses tobacco, ETOH and MJ.       History provided by:  Patient and medical records      Review of Systems   Constitutional: Negative for appetite change, chills and fever.   HENT: Negative for congestion.    Respiratory: Negative for cough and shortness of breath.    Cardiovascular: Negative for chest pain.   Gastrointestinal: Negative for abdominal pain, diarrhea, nausea and vomiting.   Musculoskeletal: Negative for arthralgias, back pain and neck pain.        Left chest wall pain     Neurological: Negative for dizziness, weakness, numbness and headaches.   All other systems reviewed and are negative.      Past Medical History:   Diagnosis Date   • A-fib (CMS/HCC)    • Anxiety    • Arthritis    • Cancer (CMS/HCC)     melanoma back   • Cataract    • COPD (chronic obstructive pulmonary disease) (CMS/HCC)    • Fainting    • Hypertension        No Known Allergies    Past Surgical History:   Procedure Laterality Date   • CARDIAC ELECTROPHYSIOLOGY PROCEDURE N/A 6/10/2020    Procedure: EP/Ablation Atypical AFL w ICE & SJM or Carto mapping, ASAP, pt has no meds that need held;  Surgeon: Beto Solomon DO;  Location: [facility] INVASIVE LOCATION;  Service: Cardiology;  Laterality: N/A;   • CATARACT EXTRACTION      right eye   • ELBOW ARTHROSCOPY Left     left elbow twice   • KNEE ARTHROSCOPY Right    • SHOULDER ARTHROSCOPY Right     left and right but had left shoulder surgery twice   • TOE ARTHROPLASTY Right        Family History   Problem Relation  Age of Onset   • Hyperlipidemia Mother    • Lung cancer Father    • Diabetes Father    • Kidney failure Father        Social History     Socioeconomic History   • Marital status:      Spouse name: Not on file   • Number of children: Not on file   • Years of education: Not on file   • Highest education level: Not on file   Tobacco Use   • Smoking status: Current Every Day Smoker     Packs/day: 0.50     Types: Cigarettes   • Smokeless tobacco: Never Used   Substance and Sexual Activity   • Alcohol use: Yes     Comment: 7-10 drinks/week   • Drug use: Yes     Frequency: 7.0 times per week     Types: Marijuana     Comment: daily   • Sexual activity: Defer   Social History Narrative    caffeine use: 1-2 servings of coffee in the am occasionally coke            Objective   Physical Exam  Vitals signs and nursing note reviewed.   Constitutional:       General: He is not in acute distress.     Appearance: Normal appearance. He is normal weight.   HENT:      Head: Atraumatic.      Nose: Nose normal.   Eyes:      Extraocular Movements: Extraocular movements intact.      Conjunctiva/sclera: Conjunctivae normal.   Neck:      Musculoskeletal: Normal range of motion and neck supple.   Cardiovascular:      Rate and Rhythm: Normal rate and regular rhythm.   Pulmonary:      Effort: Pulmonary effort is normal. No respiratory distress.      Breath sounds: Normal breath sounds.   Chest:      Chest wall: Tenderness (Left anterior lateral chest wall) present. No deformity.   Musculoskeletal: Normal range of motion.   Skin:     General: Skin is warm.   Neurological:      General: No focal deficit present.      Mental Status: He is alert.   Psychiatric:         Mood and Affect: Mood normal.         Procedures           ED Course      Discussed results with patient and tx plan. CXR with old fractures, no new bony abnormality noted.     No results found for this or any previous visit (from the past 24 hour(s)).  Note: In addition to  "lab results from this visit, the labs listed above may include labs taken at another facility or during a different encounter within the last 24 hours. Please correlate lab times with ED admission and discharge times for further clarification of the services performed during this visit.    XR Chest 1 View   Final Result   Stable chest exam with old healed left-sided rib fractures.   No clearly acute bony abnormality is seen. No other evidence of active   chest pathology.       D:  12/08/2020   E:  12/08/2020            This report was finalized on 12/8/2020 9:09 PM by Dr. Isac Franco MD.            Vitals:    12/08/20 1258 12/08/20 1701   BP: 138/83 135/78   BP Location: Left arm    Patient Position: Sitting    Pulse: 78 77   Resp: 20 18   Temp: 97.7 °F (36.5 °C)    TempSrc: Infrared    SpO2: 97% 98%   Weight: 67.1 kg (148 lb)    Height: 175.3 cm (69\")      Medications - No data to display  ECG/EMG Results (last 24 hours)     Procedure Component Value Units Date/Time    ECG 12 Lead [[id number]] Collected: 12/08/20 1339     Updated: 12/08/20 1341        ECG 12 Lead               COVID-19 RISK SCREEN     1. Has the patient had close contact without PPE with a lab confirmed COVID-19 (+) person or a person under investigation (PUI) for COVID-19 infection?  -- No     2. Has the patient had respiratory symptoms, worsened/new cough and/or SOA, unexplained fever, or sudden loss of smell and/or taste in the past 7 days? --  No    3. Does the patient have baseline higher exposure risk such as working in healthcare field, currently residing in healthcare facility, or ongoing hemodialysis?  --  No           DISCHARGE    Patient discharged in stable condition.    Reviewed implications of results, diagnosis, meds, responsibility to follow up, warning signs and symptoms of possible worsening, potential complications and reasons to return to ER.    Patient/Family voiced understanding of above instructions.    Discussed plan for " discharge, as there is no emergent indication for admission.  Pt/family is agreeable and understands need for follow up and possible repeat testing.  Pt/family is aware that discharge does not mean that nothing is wrong but that it indicates no emergency is currently present that requires admission and they must continue care with follow-up as given below or with a physician of their choice.     FOLLOW-UP  John Argueta PA  [address]  [place] [address]  [phone]    Schedule an appointment as soon as possible for a visit       [facility] Emergency Department  [address] [address] [phone]    If symptoms worsen         Medication List      No changes were made to your prescriptions during this visit.                                         MDM    Final diagnoses:   Rib contusion, left, initial encounter   History of rib fracture            Fannie Hobbs PA  12/08/20 1742

## 2020-12-16 LAB
QT INTERVAL: 404 MS
QTC INTERVAL: 448 MS

## 2020-12-18 RX ORDER — AZITHROMYCIN 250 MG/1
TABLET, FILM COATED ORAL
Qty: 6 TABLET | Refills: 1 | Status: SHIPPED | OUTPATIENT
Start: 2020-12-18 | End: 2021-01-18

## 2020-12-30 ENCOUNTER — TELEPHONE (OUTPATIENT)
Dept: FAMILY MEDICINE CLINIC | Facility: CLINIC | Age: 65
End: 2020-12-30

## 2020-12-30 RX ORDER — IPRATROPIUM BROMIDE 21 UG/1
2 SPRAY, METERED NASAL EVERY 12 HOURS
Qty: 30 ML | Refills: 12 | Status: SHIPPED | OUTPATIENT
Start: 2020-12-30 | End: 2022-10-27

## 2020-12-30 NOTE — TELEPHONE ENCOUNTER
Caller: Stepan Gaines    Relationship: Self    Best call back number: [phone]    What are your current symptoms: NASAL DRIP    How long have you been experiencing symptoms: A FEW DAYS.     Have you had these symptoms before:    [x] Yes  [] No    Have you been treated for these symptoms before:   [x] Yes  [] No    If a prescription is needed, what is your preferred pharmacy and phone number:  [facility] [address] - [address] - [phone]  - [phone]   [phone]    Additional notes: PATIENT STATES HE HAS BEEN USING FLONASE. HE IS HAVING A CLEAR FLUID DRIP FROM HIS NOSE OFTEN. HE STATES THAT HE CANNOT TAKE OVER THE COUNTER MEDICATIONS.

## 2021-01-18 ENCOUNTER — OFFICE VISIT (OUTPATIENT)
Dept: CARDIOLOGY | Facility: CLINIC | Age: 66
End: 2021-01-18

## 2021-01-18 VITALS
WEIGHT: 151 LBS | DIASTOLIC BLOOD PRESSURE: 64 MMHG | BODY MASS INDEX: 22.36 KG/M2 | HEART RATE: 66 BPM | OXYGEN SATURATION: 98 % | HEIGHT: 69 IN | SYSTOLIC BLOOD PRESSURE: 128 MMHG

## 2021-01-18 DIAGNOSIS — R00.2 PALPITATIONS: ICD-10-CM

## 2021-01-18 DIAGNOSIS — I47.1 SVT (SUPRAVENTRICULAR TACHYCARDIA) (HCC): ICD-10-CM

## 2021-01-18 DIAGNOSIS — I48.3 TYPICAL ATRIAL FLUTTER (HCC): Primary | ICD-10-CM

## 2021-01-18 DIAGNOSIS — I10 ESSENTIAL HYPERTENSION: ICD-10-CM

## 2021-01-18 PROCEDURE — 99214 OFFICE O/P EST MOD 30 MIN: CPT | Performed by: INTERNAL MEDICINE

## 2021-01-18 NOTE — PROGRESS NOTES
Cardiac Electrophysiology Outpatient Follow Up Note            [facility]    Follow Up Office Visit      Stepan Gaines  [id number]  01/18/2021    Primary Care Physician: John Argueta PA    Referred By: No ref. provider found    Subjective     Chief Complaint:   Diagnoses and all orders for this visit:    1. Typical atrial flutter (CMS/HCC) (Primary)    2. Essential hypertension    3. Palpitations    4. SVT (supraventricular tachycardia) (CMS/HCC)      Chief Complaint   Patient presents with   • Typical atrial flutter     f/u     Problem List:      Typical atrial flutter  CHADSVASc = 1  EPS with catheter ablation of atrial flutter 6/10/2020 per Dr. Neha Tubbs discontinued in July 2020  Paroxysmal SVT  Documented short runs of atrial tachycardia ~ 2017  Normal treadmill stress 6/29/2017  Maintained on metoprolol therapy  Essential hypertension  Palpitations  Syncope  COPD  Chronic pain  Tobacco abuse  Insomnia      History of Present Illness:   Stepan Gaines is a 65 y.o. male who presents to my electrophysiology clinic for follow up of his history of documented typical atrial flutter now status post catheter ablation in June 2020.  Upon evaluation today patient reports he has been doing well overall from a cardiovascular standpoint.  Patient denies chest pain, dizziness, presyncope, or syncope.  Patient does report one brief episode of palpitations only lasting a few seconds in duration approximately 2 months ago.  Patient reports he was seen in evaluation by sleep medicine for suspected sleep apnea but was diagnosed with insomnia.  Patient's blood pressure is acceptable in office today with reported controlled BP on current medical therapy at home.    Review of Systems:   Constitutional: No fevers or chills, no recent weight gain or weight loss or fatigue  Eyes: No visual loss, blurred vision, double vision, yellow sclerae.  ENT: No  headaches, hearing loss, vertigo, congestion or sore throat.   Cardiovascular: Per HPI  Respiratory: No cough or wheezing, no sputum production, no hematemesis   Gastrointestinal: No abdominal pain, no nausea, vomiting, constipation, diarrhea, melena.   Genitourinary: No dysuria, hematuria or increased frequency.  Musculoskeletal:  No gait disturbance, weakness or joint pain or stiffness  Integumentary: No rashes, urticaria, ulcers or sores.   Neurological: No headache, dizziness, syncope, paralysis, ataxia, no prior CVA/TIA  Psychiatric: No anxiety, or depression  Endocrine: No diaphoresis, cold or heat intolerance. No polyuria or polydipsia.   Hematologic/Lymphatic: No anemia, abnormal bruising or bleeding. No history of DVT/PE.      Past Medical History:   Past Medical History:   Diagnosis Date   • A-fib (CMS/HCC)    • Anxiety    • Arthritis    • Cancer (CMS/HCC)     melanoma back   • Cataract    • COPD (chronic obstructive pulmonary disease) (CMS/HCC)    • Fainting    • Hypertension        Past Surgical History:   Past Surgical History:   Procedure Laterality Date   • CARDIAC ELECTROPHYSIOLOGY PROCEDURE N/A 6/10/2020    Procedure: EP/Ablation Atypical AFL w ICE & SJM or Carto mapping, ASAP, pt has no meds that need held;  Surgeon: Beto Solomon DO;  Location: [facility] INVASIVE LOCATION;  Service: Cardiology;  Laterality: N/A;   • CATARACT EXTRACTION      right eye   • ELBOW ARTHROSCOPY Left     left elbow twice   • KNEE ARTHROSCOPY Right    • SHOULDER ARTHROSCOPY Right     left and right but had left shoulder surgery twice   • TOE ARTHROPLASTY Right        Family History:   Family History   Problem Relation Age of Onset   • Hyperlipidemia Mother    • Lung cancer Father    • Diabetes Father    • Kidney failure Father        Social History:   Social History     Socioeconomic History   • Marital status:      Spouse name: Not on file   • Number of children: Not on file   • Years of education: Not on file  "  • Highest education level: Not on file   Tobacco Use   • Smoking status: Current Every Day Smoker     Packs/day: 0.50     Types: Cigarettes   • Smokeless tobacco: Never Used   Substance and Sexual Activity   • Alcohol use: Yes     Comment: 7-10 drinks/week   • Drug use: Yes     Frequency: 7.0 times per week     Types: Marijuana     Comment: daily   • Sexual activity: Defer   Social History Narrative    caffeine use: 1-2 servings of coffee in the am occasionally coke        Medications:     Current Outpatient Medications:   •  ipratropium (ATROVENT) 0.03 % nasal spray, 2 sprays into the nostril(s) as directed by provider Every 12 (Twelve) Hours., Disp: 30 mL, Rfl: 12  •  metoprolol tartrate (LOPRESSOR) 50 MG tablet, Take 1 tablet by mouth Every 12 (Twelve) Hours. May take extra tablet as needed for tachycardia. Further refills per Dr. Solomon, Disp: 30 tablet, Rfl: 11  •  temazepam (RESTORIL) 30 MG capsule, Take 15 mg by mouth every night at bedtime., Disp: , Rfl:   •  triamterene-hydrochlorothiazide (MAXZIDE-25) 37.5-25 MG per tablet, Take 1 tablet by mouth As Needed (Daily as  needed for systolic BP greater than 140 mmhg.)., Disp: 30 tablet, Rfl: 6    Allergies:   No Known Allergies    Objective   Vital Signs:   Vitals:    01/18/21 1002   BP: 128/64   BP Location: Right arm   Patient Position: Sitting   Pulse: 66   SpO2: 98%   Weight: 68.5 kg (151 lb)   Height: 175.3 cm (69\")       PHYSICAL EXAM  General appearance: Awake, alert, cooperative  Head: Normocephalic, without obvious abnormality, atraumatic  Eyes: Conjunctivae/corneas clear, EOMs intact  Neck: no adenopathy, no carotid bruit, no JVD and thyroid: not enlarged  Lungs: clear to auscultation bilaterally and no rhonchi or crackles\", ' symmetric  Heart: regular rate and rhythm, S1, S2 normal, no murmur, click, rub or gallop  Abdomen: Soft, non-tender, bowel sounds normal,  no organomegaly  Extremities: extremities normal, atraumatic, no cyanosis or " edema  Skin: Skin color, turgor normal, no rashes or lesions  Neurologic: Grossly normal     Lab Results   Component Value Date    GLUCOSE 87 10/01/2020    CALCIUM 8.8 10/01/2020     10/01/2020    K 3.8 10/01/2020    CO2 23.0 10/01/2020     10/01/2020    BUN 7 (L) 10/01/2020    CREATININE 0.80 10/01/2020    EGFRIFNONA 98 10/01/2020    BCR 8.8 10/01/2020    ANIONGAP 16.0 (H) 10/01/2020     Lab Results   Component Value Date    WBC 10.39 10/01/2020    HGB 13.4 10/01/2020    HCT 40.5 10/01/2020    .0 (H) 10/01/2020     10/01/2020     No results found for: INR, PROTIME  Lab Results   Component Value Date    TSH 0.959 05/18/2020       Assessment & Plan    Diagnoses and all orders for this visit:    1. Typical atrial flutter (CMS/HCC) (Primary)    2. Essential hypertension    3. Palpitations    4. SVT (supraventricular tachycardia) (CMS/HCC)         Diagnosis Plan   1. Typical atrial flutter (CMS/HCC)   status post catheter ablation June 2020  no recurrent sustained palpitations  Eliquis probably discontinued 30 days post procedure.     2. Essential hypertension   controlled on current therapy.  Continue metoprolol and Maxide as prescribed     3. Palpitations   no significant recurrence.  Continue metoprolol therapy     4. SVT (supraventricular tachycardia) (CMS/HCC)   no recurrent sustained palpitations  Continue metoprolol therapy       I spent 25 minutes in consultation with this patient which included more than 65% of this time in direct face-to-face counseling, physical examination and discussion of my assessment and findings and shared decision making with the patient.    Follow Up: 6-month follow-up with HEIKE Campos      Thank you for allowing me to participate in the care of your patient. Please to not hesitate to contact me with additional questions or concerns.        Electronically signed by SHAW Doe, 01/18/21, 10:41 AM EST.

## 2021-01-22 RX ORDER — METOPROLOL TARTRATE 50 MG/1
50 TABLET, FILM COATED ORAL EVERY 12 HOURS
Qty: 60 TABLET | Refills: 6 | Status: SHIPPED | OUTPATIENT
Start: 2021-01-22 | End: 2022-01-07

## 2021-01-28 ENCOUNTER — OFFICE VISIT (OUTPATIENT)
Dept: SLEEP MEDICINE | Facility: HOSPITAL | Age: 66
End: 2021-01-28

## 2021-01-28 VITALS
DIASTOLIC BLOOD PRESSURE: 69 MMHG | OXYGEN SATURATION: 97 % | HEIGHT: 69 IN | SYSTOLIC BLOOD PRESSURE: 140 MMHG | BODY MASS INDEX: 22.63 KG/M2 | HEART RATE: 69 BPM | WEIGHT: 152.8 LBS

## 2021-01-28 DIAGNOSIS — F51.04 PSYCHOPHYSIOLOGICAL INSOMNIA: ICD-10-CM

## 2021-01-28 DIAGNOSIS — G47.19 EXCESSIVE DAYTIME SLEEPINESS: Primary | ICD-10-CM

## 2021-01-28 PROCEDURE — 99214 OFFICE O/P EST MOD 30 MIN: CPT | Performed by: INTERNAL MEDICINE

## 2021-01-28 NOTE — PROGRESS NOTES
"Subjective:     Chief Complaint:   Chief Complaint   Patient presents with   • Follow-up       HPI:    Stepan Gaines is a 65 y.o. male here for follow-up of chronic insomnia    He has a 2-year history of sleeping difficulties.  He can fairly accurately trace back his sleeping problems to his wife's recent prolonged illness and subsequent death from lung cancer.  He says that he would frequently awaken to help give her her medications or give her other care and even since her death this pattern has persisted.     To make matters worse, he now is dealing with caring for his mother with significant dementia.  He says he has to sleep \"with one eye open\" to keep her from wandering out of the house at night.     He says that his wife told him in the past that he snores but never described severe or loud snoring or apneas.     He fairly clearly says that prior to his wife's illness he slept quite well and was not somnolent during the day.    At the time of my initial evaluation in December was not felt that sleep apnea was a likely diagnosis and that his insomnia was the major issue.  He was referred to Dr. Rivas and has undergone extensive evaluation.  I have reviewed his notes.    Dr. Rivas has recommended sleep restriction therapy as part of his treatment but the patient has found difficulty complying with this due to his mother's memory issues due to her severe dementia and nocturnal activities.  He was advised to try to avoid taking Restoril if possible but has found this difficult.    Anselmo Scale is: 4/24    Current medications are:   Current Outpatient Medications:   •  ipratropium (ATROVENT) 0.03 % nasal spray, 2 sprays into the nostril(s) as directed by provider Every 12 (Twelve) Hours., Disp: 30 mL, Rfl: 12  •  metoprolol tartrate (LOPRESSOR) 50 MG tablet, Take 1 tablet by mouth Every 12 (Twelve) Hours., Disp: 60 tablet, Rfl: 6  •  temazepam (RESTORIL) 30 MG capsule, Take 15 mg by mouth every night at bedtime., " Disp: , Rfl:   •  triamterene-hydrochlorothiazide (MAXZIDE-25) 37.5-25 MG per tablet, Take 1 tablet by mouth As Needed (Daily as  needed for systolic BP greater than 140 mmhg.)., Disp: 30 tablet, Rfl: 6.      The patient's relevant past medical, surgical, family and social history were reviewed and updated in Epic as appropriate.     ROS:    Review of Systems   Psychiatric/Behavioral: Positive for sleep disturbance.         Objective:    Physical Exam  Vitals signs reviewed.   Constitutional:       Appearance: He is well-developed.   HENT:      Head: Normocephalic and atraumatic.      Mouth/Throat:      Mouth: Mucous membranes are moist.      Pharynx: Oropharynx is clear.   Neck:      Musculoskeletal: Neck supple.      Thyroid: No thyromegaly.   Cardiovascular:      Rate and Rhythm: Normal rate and regular rhythm.      Heart sounds: No murmur. No friction rub. No gallop.    Pulmonary:      Effort: Pulmonary effort is normal. No respiratory distress.      Breath sounds: No wheezing or rales.   Skin:     General: Skin is warm and dry.   Neurological:      Mental Status: He is alert and oriented to person, place, and time.   Psychiatric:         Behavior: Behavior normal.         Thought Content: Thought content normal.         DATA:    Reviewed Dr. Rivas's notes from December 2000 20x2    Assessment:    Problem List Items Addressed This Visit     Psychophysiological insomnia    Excessive daytime sleepiness - Primary        1. Chronic/psychophysiologic insomnia: I think he has received excellent guidance from Dr. Rivas and I concur with treatment plan.  The problem is his inability to adequately comply on a regular basis with the treatment regimen due to his care of his mother at night.  2. Daytime somnolence: Macclenny scale only 4 at this point but clearly is an ongoing issue.  3. Anxiety: Exacerbated by the above      Plan:     1. I do not have any different recommendations than Dr. Rivas has given him and I spent some  time with the patient confirming those recommendations.  The problems seem to be, to some degree, external and difficult to address given his mother's illness.  He does realize that at some point he may need to place her in some type of memory facility for her benefit and safety as well as his.  2. One long-term goal would be to get him free of sedating medicines to improve his sleep quality.  He agrees with working towards that but does not feel he can stop his medications all at once.  3. See no role for a polysomnogram at this point  4. Will continue to follow    Discussed in detail with the patient.  He will call prior to his follow up visit for any new problems.    Level of Risk Moderate due to: mild exacerbation of one chronic illness     Level of service justified based on 35 minutes spent in patient care on this date of service including, but not limited to: preparing to see the patient, obtaining and/or reviewing history, performing medically appropriate examination, ordering tests/medicine/procedures, independently interpreting results, documenting clinical information in EHR, and counseling/education of patient/family/caregiver. (Level 4 30-39 minutes; Level 5 40-54 minutes)      Signed by  Moody Bravo MD

## 2021-02-12 ENCOUNTER — TELEMEDICINE (OUTPATIENT)
Dept: FAMILY MEDICINE CLINIC | Facility: CLINIC | Age: 66
End: 2021-02-12

## 2021-02-12 DIAGNOSIS — G47.00 INSOMNIA, UNSPECIFIED TYPE: Primary | ICD-10-CM

## 2021-02-12 DIAGNOSIS — F41.9 ANXIETY: ICD-10-CM

## 2021-02-12 DIAGNOSIS — J01.90 ACUTE SINUSITIS, RECURRENCE NOT SPECIFIED, UNSPECIFIED LOCATION: ICD-10-CM

## 2021-02-12 PROCEDURE — 99213 OFFICE O/P EST LOW 20 MIN: CPT | Performed by: FAMILY MEDICINE

## 2021-02-12 RX ORDER — ALPRAZOLAM 1 MG/1
1 TABLET ORAL NIGHTLY PRN
Qty: 30 TABLET | Refills: 3 | Status: SHIPPED | OUTPATIENT
Start: 2021-02-12 | End: 2021-05-25

## 2021-02-12 RX ORDER — AZITHROMYCIN 250 MG/1
TABLET, FILM COATED ORAL
Qty: 6 TABLET | Refills: 0 | Status: SHIPPED | OUTPATIENT
Start: 2021-02-12 | End: 2021-04-05 | Stop reason: SDUPTHER

## 2021-02-15 NOTE — PROGRESS NOTES
Subjective   Stepan Gaines is a 65 y.o. male   Patient requests and consents to telemedicine visit using audiovisual aid    Chief Complaint    Sinus congestion and drainage  Sore throat  Cough  Anxiety  Insomnia    History of Present Illness  Patient presents today for telemedicine visit.  This is the first time I visited with this patient as he is seeing the physician assistant here previously.  He complains of an acute illness involving sinus congestion, sinus pressure, sore throat and occasional productive cough.  Sputum is yellow color.  No blood.  Denies earache.  Denies fever, chills, loss of taste or smell, myalgias or arthralgias.  Has history of insomnia but is complaining more of anxiety symptoms then true insomnia.  He is very anxious about his own health and his mother's health.  He states when he tries to go to sleep he cannot shut his mind off worrying about things.  It appears from reviewing his chart he has been on amitriptyline and also Restoril as aids for sleeping.    The following portions of the patient's history were reviewed and updated as appropriate: allergies, current medications, past social history and problem list    Review of Systems   Constitutional: Negative for chills, fatigue and fever.   HENT: Positive for congestion, ear pain, postnasal drip, rhinorrhea and sinus pressure. Negative for sore throat.    Eyes: Negative for pain.   Respiratory: Positive for cough and wheezing. Negative for chest tightness and shortness of breath.    Cardiovascular: Negative for chest pain and palpitations.   Gastrointestinal: Negative.  Negative for nausea.   Genitourinary: Negative.    Musculoskeletal: Negative for arthralgias and myalgias.   Neurological: Positive for headaches. Negative for dizziness, tremors, seizures, syncope and weakness.   Hematological: Negative for adenopathy.   Psychiatric/Behavioral: Positive for sleep disturbance. Negative for dysphoric mood. The patient is  nervous/anxious.        Objective     There were no vitals filed for this visit.    Physical Exam  Constitutional:       Appearance: Normal appearance.   HENT:      Head: Normocephalic and atraumatic.   Neurological:      Mental Status: He is alert and oriented to person, place, and time.   Psychiatric:         Mood and Affect: Mood normal.         Behavior: Behavior normal.         Assessment/Plan   Problems Addressed this Visit     None      Visit Diagnoses     Insomnia, unspecified type    -  Primary    Relevant Medications    ALPRAZolam (Xanax) 1 MG tablet    Anxiety        Relevant Medications    ALPRAZolam (Xanax) 1 MG tablet    Acute sinusitis, recurrence not specified, unspecified location        Relevant Medications    azithromycin (Zithromax Z-Galo) 250 MG tablet      Diagnoses       Codes Comments    Insomnia, unspecified type    -  Primary ICD-10-CM: G47.00  ICD-9-CM: 780.52     Anxiety     ICD-10-CM: F41.9  ICD-9-CM: 300.00     Acute sinusitis, recurrence not specified, unspecified location     ICD-10-CM: J01.90  ICD-9-CM: 461.9         Telemedicine visit 18 minutes

## 2021-04-05 DIAGNOSIS — J01.90 ACUTE SINUSITIS, RECURRENCE NOT SPECIFIED, UNSPECIFIED LOCATION: ICD-10-CM

## 2021-04-05 RX ORDER — AZITHROMYCIN 250 MG/1
TABLET, FILM COATED ORAL
Qty: 6 TABLET | Refills: 0 | Status: SHIPPED | OUTPATIENT
Start: 2021-04-05 | End: 2021-05-25

## 2021-04-05 NOTE — TELEPHONE ENCOUNTER
Caller: Stepan Gaines    Relationship: Self    Best call back number: [phone]    Medication needed:   Requested Prescriptions     Pending Prescriptions Disp Refills   • azithromycin (Zithromax Z-Galo) 250 MG tablet 6 tablet 0     Sig: Take 2 tablets the first day, then 1 tablet daily for 4 days.       What additional details did the patient provide when requesting the medication: PATIENT STATES HE FINISHED THE Z GALO AND IS REQUESTING ANOTHER ONE   HE STILL HAS SNEEZING, COUGHING, RUNNY EYES AND NOSE       Does the patient have less than a 3 day supply:  [x] Yes  [] No    What is the patient's preferred pharmacy: [facility] [place] [address] [address] [facility] [phone] [facility] [phone] FX           ”

## 2021-05-21 ENCOUNTER — TELEPHONE (OUTPATIENT)
Dept: FAMILY MEDICINE CLINIC | Facility: CLINIC | Age: 66
End: 2021-05-21

## 2021-05-21 NOTE — TELEPHONE ENCOUNTER
This will be discussed at patient's upcoming appointment May 25. Garret is unable to increase med without patient being seen.    I tried to contact patient but no answer and voicemail isn't set up.

## 2021-05-21 NOTE — TELEPHONE ENCOUNTER
Caller: Stepan Gaines    Relationship: Self    Best call back number:    [phone]     What medication are you requesting:     ALPRAZolam (Xanax) 1 MG tablet    PATIENT REQUESTED A CHANGE TO DOSE TO (1 1/2 MG) INSTEAD OF (1) MG DAILY AS CURRENTLY SET FOR DOSE    What are your current symptoms:     UNABLE TO SLEEP    How long have you been experiencing symptoms:     SINCE PATIENT'S WIFE   2 1/2 YEARS AGO    Have you had these symptoms before:    [] Yes  [x] No    Have you been treated for these symptoms before:   [] Yes  [x] No    If a prescription is needed, what is your preferred pharmacy and phone number:      [place], [place]    TELEPHONE CONTACT:    [phone]    FAX:    [phone]    [facility]    Additional notes:    N/A

## 2021-05-25 ENCOUNTER — OFFICE VISIT (OUTPATIENT)
Dept: FAMILY MEDICINE CLINIC | Facility: CLINIC | Age: 66
End: 2021-05-25

## 2021-05-25 VITALS
HEIGHT: 69 IN | RESPIRATION RATE: 14 BRPM | SYSTOLIC BLOOD PRESSURE: 124 MMHG | OXYGEN SATURATION: 97 % | TEMPERATURE: 97.1 F | BODY MASS INDEX: 23.02 KG/M2 | DIASTOLIC BLOOD PRESSURE: 76 MMHG | HEART RATE: 72 BPM | WEIGHT: 155.4 LBS

## 2021-05-25 DIAGNOSIS — F51.01 PRIMARY INSOMNIA: Primary | ICD-10-CM

## 2021-05-25 DIAGNOSIS — H10.13 ALLERGIC CONJUNCTIVITIS OF BOTH EYES: ICD-10-CM

## 2021-05-25 DIAGNOSIS — G47.00 INSOMNIA, UNSPECIFIED TYPE: Primary | ICD-10-CM

## 2021-05-25 DIAGNOSIS — Z12.11 SCREEN FOR COLON CANCER: ICD-10-CM

## 2021-05-25 DIAGNOSIS — H57.13 EYE PAIN, BILATERAL: ICD-10-CM

## 2021-05-25 PROCEDURE — 99214 OFFICE O/P EST MOD 30 MIN: CPT | Performed by: PHYSICIAN ASSISTANT

## 2021-05-25 RX ORDER — KETOROLAC TROMETHAMINE 5 MG/ML
1 SOLUTION OPHTHALMIC 4 TIMES DAILY
Qty: 30 ML | Refills: 1 | Status: SHIPPED | OUTPATIENT
Start: 2021-05-25 | End: 2021-07-06 | Stop reason: SDUPTHER

## 2021-05-25 RX ORDER — LORAZEPAM 1 MG/1
1 TABLET ORAL NIGHTLY
Qty: 14 TABLET | Refills: 0 | Status: SHIPPED | OUTPATIENT
Start: 2021-05-25 | End: 2021-06-08 | Stop reason: SDUPTHER

## 2021-05-25 NOTE — PROGRESS NOTES
Subjective   Stepan Gaines is a 65 y.o. male  Eye Problem (Bilat eyes ) and Insomnia (Has tried alprazolam, temazepam, Ambien (nightmares), trazodone with no relief )      History of Present Illness  Patient is a pleasant 62-year-old who comes in complaining of itchy irritated eyes, allergy symptoms cleared drainage from eyes.  Patient has a history of cataracts like to be referred to ophthalmology for eye pain    Patient complains of chronic insomnia is used multiple medication without success has tried Xanax Restoril Ambien was called nightmares and trazodone did not helpAnswers for HPI/ROS submitted by the patient on 5/25/2021  What is the primary reason for your visit?: Other  Please describe your symptoms.: Eye infection and insomnia  Have you had these symptoms before?: Yes  How long have you been having these symptoms?: Greater than 2 weeks      The following portions of the patient's history were reviewed and updated as appropriate: allergies, current medications, past social history and problem list    Review of Systems   Constitutional: Negative for appetite change, diaphoresis, fatigue and unexpected weight change.   Eyes: Negative for visual disturbance.   Respiratory: Negative for cough, chest tightness and shortness of breath.    Cardiovascular: Negative for chest pain, palpitations and leg swelling.   Gastrointestinal: Negative for diarrhea, nausea and vomiting.   Endocrine: Negative for polydipsia, polyphagia and polyuria.   Skin: Negative for color change and rash.   Neurological: Negative for dizziness, syncope, weakness, light-headedness, numbness and headaches.       Objective     Vitals:    05/25/21 1558   BP: 124/76   Pulse: 72   Resp: 14   Temp: 97.1 °F (36.2 °C)   SpO2: 97%       Physical Exam  Vitals and nursing note reviewed.   Constitutional:       Appearance: He is well-developed.   Eyes:      Conjunctiva/sclera:      Right eye: Right conjunctiva is injected.      Left eye: Left  conjunctiva is injected.   Neck:      Vascular: No JVD.   Cardiovascular:      Rate and Rhythm: Normal rate and regular rhythm.      Pulses: Normal pulses.      Heart sounds: Normal heart sounds. No murmur heard.     Pulmonary:      Effort: Pulmonary effort is normal. No respiratory distress.      Breath sounds: Normal breath sounds.   Abdominal:      General: Bowel sounds are normal.      Palpations: Abdomen is soft.      Tenderness: There is no abdominal tenderness.   Skin:     General: Skin is warm and dry.         Assessment/Plan     Diagnoses and all orders for this visit:    1. Primary insomnia (Primary)    2. Allergic conjunctivitis of both eyes  -     ketorolac (Acular) 0.5 % ophthalmic solution; Administer 1 drop to both eyes 4 (Four) Times a Day.  Dispense: 30 mL; Refill: 1    3. Screen for colon cancer  -     Cologuard - Stool, Per Rectum; Future    4. Eye pain, bilateral  -     Ambulatory Referral to Ophthalmology       #1 spoke with Dr. Piedra, trial of Ativan 1 mg nightly dispense 14    2.  Acular drops 1 drop 4 times a day each eye as needed irritated eye     #3 Cologuard for colorectal screening    4.  Refer to ophthalmology for eye pain and history of cataracts

## 2021-06-03 DIAGNOSIS — G47.00 INSOMNIA, UNSPECIFIED TYPE: ICD-10-CM

## 2021-06-03 RX ORDER — LORAZEPAM 1 MG/1
1 TABLET ORAL NIGHTLY
Qty: 14 TABLET | Refills: 0 | Status: CANCELLED | OUTPATIENT
Start: 2021-06-03

## 2021-06-03 NOTE — TELEPHONE ENCOUNTER
Caller: Stepan Gaines    Relationship: Self    Best call back number: [phone]  Medication needed:   Requested Prescriptions     Pending Prescriptions Disp Refills   • LORazepam (Ativan) 1 MG tablet 14 tablet 0     Sig: Take 1 tablet by mouth Every Night.       When do you need the refill by: SOON    What additional details did the patient provide when requesting the medication: PATIENT WILL NEED A REFILL BY Monday 6/7. PATIENT STATES MEDICATION IS WORKING FOR HIS INSOMNIA.    Does the patient have less than a 3 day supply:  [] Yes  [x] No    What is the patient's preferred pharmacy: [address] [facility] - [phone] [facility] [phone] FX

## 2021-06-07 NOTE — TELEPHONE ENCOUNTER
Caller: Stepan Gaines    Relationship: Self    Best call back number: [phone]    Medication needed:   Requested Prescriptions     Pending Prescriptions Disp Refills   • LORazepam (Ativan) 1 MG tablet 14 tablet 0     Sig: Take 1 tablet by mouth Every Night.       When do you need the refill by: 06/07/2021    What additional details did the patient provide when requesting the medication: PATIENT IS COMPLETELY OUT OF MEDICATION     PATIENT WOULD LIKE A CALL BACK REGARDING THE STATUS OF HIS REFERRAL TO OPTHALMOLOGY    Does the patient have less than a 3 day supply:  [x] Yes  [] No    What is the patient's preferred pharmacy: [facility] [address] [facility] [phone] [facility] [phone] FX

## 2021-06-08 RX ORDER — LORAZEPAM 1 MG/1
1 TABLET ORAL NIGHTLY
Qty: 20 TABLET | Refills: 0 | Status: SHIPPED | OUTPATIENT
Start: 2021-06-08 | End: 2021-07-06 | Stop reason: SDUPTHER

## 2021-06-08 NOTE — TELEPHONE ENCOUNTER
Pt aware that providers have been/are out of the office. I told him that the medication is pending for Dr. Cali to send in. He will call back to schedule a follow up appointment before prescription runs out.

## 2021-06-08 NOTE — TELEPHONE ENCOUNTER
We can call in Ativan 1 mg #20 it could be 2 to 3 days before he actually is called in however, he is only supposed to use this as needed if he is taking every night we may need to switch to something else he needs a follow-up appointment

## 2021-06-08 NOTE — TELEPHONE ENCOUNTER
PATIENT STATED THAT HE CONTACTED OUR OFFICE ABOUT A REFILL ON 6/3 AND 6/7/21 ABOUT A REFILL OF AND HAS NOT HAD A RESPONSE FROM OUR OFFICE. PATIENT WOULD LIKE TO KNOW THE STATUS OF THE MEDICATION REFILL. PATIENT IS NOW OUT OF MEDICATIONS.      CALL BACK:[phone]       PHARMACY:[facility] [place] [address] [address] [place] - [phone]  - [phone]   [phone]

## 2021-06-08 NOTE — TELEPHONE ENCOUNTER
PATIENT CALLED TO FOLLOW UP ON REFILL FOR HIS LORAZEPAM.  PATIENT STATED HE WOULD LIKE TO BE ABLE TO  BY 3:30PM TODAY, 6/8.  PATIENT HAS BEEN UNABLE SLEEP AND MOTHERS CAREGIVER LEAVES AT 4 TODAY SO HE IS UNABLE TO LEAVE HIS MOTHER HOME ALONE DUE TO ALZHEIMER.      CALL BACK: [phone]    PHARMACY:      [facility] [address] [place] [address] [address] [place] - [phone]  - [phone]   [phone]

## 2021-07-06 ENCOUNTER — OFFICE VISIT (OUTPATIENT)
Dept: FAMILY MEDICINE CLINIC | Facility: CLINIC | Age: 66
End: 2021-07-06

## 2021-07-06 VITALS
HEIGHT: 69 IN | RESPIRATION RATE: 14 BRPM | DIASTOLIC BLOOD PRESSURE: 80 MMHG | BODY MASS INDEX: 22.54 KG/M2 | SYSTOLIC BLOOD PRESSURE: 128 MMHG | TEMPERATURE: 96.9 F | OXYGEN SATURATION: 99 % | HEART RATE: 61 BPM | WEIGHT: 152.2 LBS

## 2021-07-06 DIAGNOSIS — G47.00 INSOMNIA, UNSPECIFIED TYPE: ICD-10-CM

## 2021-07-06 DIAGNOSIS — G47.00 INSOMNIA, UNSPECIFIED TYPE: Primary | ICD-10-CM

## 2021-07-06 DIAGNOSIS — H10.13 ALLERGIC CONJUNCTIVITIS OF BOTH EYES: ICD-10-CM

## 2021-07-06 PROCEDURE — 99213 OFFICE O/P EST LOW 20 MIN: CPT | Performed by: PHYSICIAN ASSISTANT

## 2021-07-06 RX ORDER — KETOROLAC TROMETHAMINE 5 MG/ML
1 SOLUTION OPHTHALMIC 4 TIMES DAILY
Qty: 30 ML | Refills: 1 | Status: SHIPPED | OUTPATIENT
Start: 2021-07-06 | End: 2022-10-27

## 2021-07-06 RX ORDER — LORAZEPAM 1 MG/1
1 TABLET ORAL NIGHTLY
Qty: 30 TABLET | Refills: 5 | Status: SHIPPED | OUTPATIENT
Start: 2021-07-06 | End: 2021-12-16

## 2021-07-06 NOTE — PROGRESS NOTES
Subjective   Stepan Gaines is a 65 y.o. male  Insomnia (RF lorazepam 1mg qhs) and Eye Problem (RF Acular gtts)      History of Present Illness  Is a pleasant 65-year-old white male who comes in complaining of insomnia trouble sleeping states Ativan works well takes 1 mg bedtime no shortness of breath no chest pain meds working well sleeping well on medication 7-8 hours    Has runny and watery eyes history of recurrent allergic conjunctivitis needing refills of Acular  The following portions of the patient's history were reviewed and updated as appropriate: allergies, current medications, past social history and problem list    Review of Systems   Constitutional: Negative for appetite change, diaphoresis, fatigue and unexpected weight change.   Eyes: Negative for visual disturbance.   Respiratory: Negative for cough, chest tightness and shortness of breath.    Cardiovascular: Negative for chest pain, palpitations and leg swelling.   Gastrointestinal: Negative for diarrhea, nausea and vomiting.   Endocrine: Negative for polydipsia, polyphagia and polyuria.   Skin: Negative for color change and rash.   Neurological: Negative for dizziness, syncope, weakness, light-headedness, numbness and headaches.       Objective     Vitals:    07/06/21 1301   BP: 128/80   Pulse: 61   Resp: 14   Temp: 96.9 °F (36.1 °C)   SpO2: 99%       Physical Exam  Vitals and nursing note reviewed.   Constitutional:       Appearance: He is well-developed. He is not diaphoretic.   Neck:      Thyroid: No thyromegaly.      Vascular: No JVD.   Cardiovascular:      Rate and Rhythm: Normal rate and regular rhythm.      Pulses: Normal pulses.      Heart sounds: Normal heart sounds. No murmur heard.     Pulmonary:      Effort: Pulmonary effort is normal. No respiratory distress.      Breath sounds: Normal breath sounds.   Abdominal:      General: Bowel sounds are normal.      Palpations: Abdomen is soft.      Tenderness: There is no abdominal  tenderness.   Musculoskeletal:      Cervical back: Neck supple.   Lymphadenopathy:      Cervical: No cervical adenopathy.   Skin:     General: Skin is warm and dry.   Neurological:      Sensory: No sensory deficit.         Assessment/Plan     Diagnoses and all orders for this visit:    1. Insomnia, unspecified type (Primary)    2. Allergic conjunctivitis of both eyes  -     ketorolac (Acular) 0.5 % ophthalmic solution; Administer 1 drop to both eyes 4 (Four) Times a Day.  Dispense: 30 mL; Refill: 1    #1 Ativan 1 mg 1 p.o. nightly dispense 30    Follow-up if no better

## 2021-07-08 ENCOUNTER — TELEPHONE (OUTPATIENT)
Dept: FAMILY MEDICINE CLINIC | Facility: CLINIC | Age: 66
End: 2021-07-08

## 2021-07-08 NOTE — TELEPHONE ENCOUNTER
PATIENT IS NEEDING A PRIOR AUTHORIZATION FOR HIS  LORazepam (Ativan) 1 MG tablet PLEASE SEND TO [facility] - [place], [place] - [address] - [phone]  - [phone] FX

## 2021-07-15 ENCOUNTER — TELEPHONE (OUTPATIENT)
Dept: FAMILY MEDICINE CLINIC | Facility: CLINIC | Age: 66
End: 2021-07-15

## 2021-07-15 NOTE — TELEPHONE ENCOUNTER
Chart shows he was referred to [facility] in May. Maybe we can have Shanda look into this.

## 2021-07-15 NOTE — TELEPHONE ENCOUNTER
Caller: Stepan Gaines    Relationship: Self    Best call back number: [phone]    What medication are you requesting: ANABATIC      What are your current symptoms: INFECTION IN LEFT EYE    How long have you been experiencing symptoms: 2 DAYS    Have you had these symptoms before:    [x] Yes  [] No    Have you been treated for these symptoms before:   [x] Yes  [] No    If a prescription is needed, what is your preferred pharmacy and phone number: [facility] [address] [place] - [phone] [facility] [phone]      Additional notes: PATIENT WAS SUPPOSE TO HAVE A CONSULTATION FOR CATARACT SURGERY

## 2021-07-16 ENCOUNTER — TELEPHONE (OUTPATIENT)
Dept: FAMILY MEDICINE CLINIC | Facility: CLINIC | Age: 66
End: 2021-07-16

## 2021-07-16 RX ORDER — GENTAMICIN SULFATE 3 MG/ML
1 SOLUTION/ DROPS OPHTHALMIC EVERY 4 HOURS
Qty: 5 ML | Refills: 1 | Status: SHIPPED | OUTPATIENT
Start: 2021-07-16 | End: 2021-08-19

## 2021-07-16 NOTE — TELEPHONE ENCOUNTER
Caller: ROB MCKAYKIZZY    Phone Number: [phone]    Reason for Call: KIZZY STATES THAT THE PRIOR AUTHORIZATION FOR LORAZEPAM WAS DENIED AND THEY WILL SEND PROPER DOCUMENTATION .PATIENT HAS BEEN NOTIFIED

## 2021-07-16 NOTE — TELEPHONE ENCOUNTER
Caller: Stepan Gaines    Relationship: Self    Best call back number:[phone]     What was the call regarding: PATIENT CALLED TO CHECK STATUS OF A REQUEST FOR THE CHANGE IN HIS EYE DROPS.  PATIENT STATED THAT THE DROPS ARE NOT WORKING .  PATIENT STATED THAT THE INFECTION IS GETTING WORSE.    Do you require a callback: YES    PHARMACY VERIFIED [facility] - [place], [place] - [address] - [phone]  - [phone] FX

## 2021-08-19 RX ORDER — GENTAMICIN SULFATE 3 MG/ML
SOLUTION/ DROPS OPHTHALMIC
Qty: 5 EACH | Refills: 1 | Status: SHIPPED | OUTPATIENT
Start: 2021-08-19 | End: 2022-10-27

## 2021-12-07 ENCOUNTER — TELEPHONE (OUTPATIENT)
Dept: FAMILY MEDICINE CLINIC | Facility: CLINIC | Age: 66
End: 2021-12-07

## 2021-12-07 NOTE — TELEPHONE ENCOUNTER
PATIENT HAS CALLED AND STATED THAT THE   LORazepam (Ativan) 1 MG tablet IS NO LONGER WORKING. PATIENT STATES HE HAS BEEN UP FOR 3 DAYS AND MEDICATION IS NOT WORKING.    PATIENT IS REQUESTING IF A PRESCRIPTION FOR XANAX 10 MG COULD BE CALLED INTO PHARMACY.  PATIENT STATES HE HAS TAKEN THE XANAX IN THE PAST AND IT WORKED FOR HIM.    [facility]    PATIENT IS REQUESTING A CALL BACK EITHER WAY -[phone]

## 2021-12-16 ENCOUNTER — TELEPHONE (OUTPATIENT)
Dept: FAMILY MEDICINE CLINIC | Facility: CLINIC | Age: 66
End: 2021-12-16

## 2021-12-16 ENCOUNTER — OFFICE VISIT (OUTPATIENT)
Dept: FAMILY MEDICINE CLINIC | Facility: CLINIC | Age: 66
End: 2021-12-16

## 2021-12-16 VITALS
BODY MASS INDEX: 23.4 KG/M2 | SYSTOLIC BLOOD PRESSURE: 136 MMHG | HEIGHT: 69 IN | DIASTOLIC BLOOD PRESSURE: 76 MMHG | WEIGHT: 158 LBS

## 2021-12-16 DIAGNOSIS — G47.00 INSOMNIA, UNSPECIFIED TYPE: ICD-10-CM

## 2021-12-16 DIAGNOSIS — F41.9 ANXIETY: ICD-10-CM

## 2021-12-16 DIAGNOSIS — F41.1 GAD (GENERALIZED ANXIETY DISORDER): Primary | ICD-10-CM

## 2021-12-16 PROCEDURE — 99213 OFFICE O/P EST LOW 20 MIN: CPT | Performed by: PHYSICIAN ASSISTANT

## 2021-12-16 RX ORDER — TRIAMTERENE AND HYDROCHLOROTHIAZIDE 37.5; 25 MG/1; MG/1
1 TABLET ORAL AS NEEDED
Qty: 30 TABLET | Refills: 6 | Status: SHIPPED | OUTPATIENT
Start: 2021-12-16 | End: 2022-03-24 | Stop reason: SDUPTHER

## 2021-12-16 RX ORDER — ALPRAZOLAM 1 MG/1
1 TABLET ORAL NIGHTLY
Qty: 30 TABLET | Refills: 5 | Status: SHIPPED | OUTPATIENT
Start: 2021-12-16 | End: 2022-05-23 | Stop reason: SDUPTHER

## 2021-12-16 RX ORDER — ALPRAZOLAM 1 MG/1
TABLET ORAL
Qty: 30 TABLET | OUTPATIENT
Start: 2021-12-16

## 2021-12-16 RX ORDER — ALPRAZOLAM 1 MG/1
1 TABLET ORAL 2 TIMES DAILY PRN
Qty: 30 TABLET | Refills: 0 | Status: SHIPPED | OUTPATIENT
Start: 2021-12-16 | End: 2021-12-16

## 2021-12-16 NOTE — TELEPHONE ENCOUNTER
Med sent to pharmacy by Dr. Cali.     Chart shows that two different Xanax scripts were sent to [facility] so I called and cancelled the one for BID dosing which was incorrect.

## 2021-12-16 NOTE — PROGRESS NOTES
Subjective Even going    11/19/1949  Stepan Gaines is a 66 y.o. male  Insomnia (Lorazepam no longer effective )      History of Present Illness    The following portions of the patient's history were reviewed and updated as appropriate: allergies, current medications, past social history and problem list    Review of Systems    Objective     Vitals:    12/16/21 1303   BP: 136/76       Physical Exam    Assessment/Plan     Diagnoses and all orders for this visit:    1. EZIO (generalized anxiety disorder) (Primary)

## 2021-12-16 NOTE — TELEPHONE ENCOUNTER
Caller: Stepan Gaines    Relationship: Self    Best call back number:    [phone]     What medication are you requesting:     XANAX    PATIENT STATED ADITHYA BARNHART WOULD PRESCRIBE THE MEDICATION LISTED ABOVE TODAY, 12/16/21    What are your current symptoms:     SLEEPING ISSUES    How long have you been experiencing symptoms:     N/A    Have you had these symptoms before:    [] Yes  [] No    N/A    Have you been treated for these symptoms before:   [] Yes  [] No    N/A    If a prescription is needed, what is your preferred pharmacy and phone number:      [place], [place]    TELEPHONE CONTACT:    [phone]    Additional notes:    N/A    ADITHYA BARNHART

## 2021-12-16 NOTE — PROGRESS NOTES
Subjective   Stepan Gaines is a 66 y.o. male  Anxiety      History of Present Illness     He presents today for follow-up for anxiety. He is doing much better. He is requesting refills of his medications. The patient takes lorazepam for his anxiety, however, he states this has not been working for him. When he first started taking the medicine, he did notice a difference. He is under a lot of stress with his mother being ill. He has taken Xanax in the past with significant improvement. The patient would like to get the Xanax prescribed.     He is no longer using the nasal spray now because it was not working for him. His pharmacist recommended taking Zyrtec which has provided significant relief for him. He takes the Zyrtec once a day.     The following portions of the patient's history were reviewed and updated as appropriate: allergies, current medications, past social history and problem list    Review of Systems   Constitutional: Negative for appetite change, diaphoresis, fatigue and unexpected weight change.   Eyes: Negative for visual disturbance.   Respiratory: Negative for cough, chest tightness and shortness of breath.    Cardiovascular: Negative for chest pain, palpitations and leg swelling.   Gastrointestinal: Negative for diarrhea, nausea and vomiting.   Endocrine: Negative for polydipsia, polyphagia and polyuria.   Skin: Negative for color change and rash.   Neurological: Negative for dizziness, syncope, weakness, light-headedness, numbness and headaches.       Objective     Vitals:    12/16/21 1303   BP: 136/76       Physical Exam  Vitals and nursing note reviewed.   Constitutional:       General: He is not in acute distress.     Appearance: Normal appearance. He is well-developed. He is not ill-appearing, toxic-appearing or diaphoretic.   Neck:      Thyroid: No thyromegaly.      Vascular: No carotid bruit or JVD.   Cardiovascular:      Rate and Rhythm: Normal rate and regular rhythm.      Pulses:  Normal pulses.      Heart sounds: Normal heart sounds. No murmur heard.      Pulmonary:      Effort: Pulmonary effort is normal. No respiratory distress.      Breath sounds: Normal breath sounds.   Abdominal:      Palpations: Abdomen is soft. There is no mass.      Tenderness: There is no abdominal tenderness.   Musculoskeletal:      Cervical back: Neck supple.   Lymphadenopathy:      Cervical: No cervical adenopathy.   Skin:     General: Skin is warm and dry.   Neurological:      Mental Status: He is alert.      Sensory: No sensory deficit.         Assessment/Plan     Diagnoses and all orders for this visit:    1. EZIO (generalized anxiety disorder) (Primary)  - He continues to have breakthrough episodes and he is getting no relief with the lorazepam. I will refill his Xanax which has worked for him in the past.    -     Discontinue: ALPRAZolam (Xanax) 1 MG tablet; Take 1 tablet by mouth 2 (Two) Times a Day As Needed for Anxiety.  Dispense: 30 tablet; Refill: 0    Other orders  -     triamterene-hydrochlorothiazide (MAXZIDE-25) 37.5-25 MG per tablet; Take 1 tablet by mouth As Needed (Daily as  needed for systolic BP greater than 140 mmhg.).  Dispense: 30 tablet; Refill: 6      I spent 15 minutes in patient care: Reviewing records prior to the visit, examining the patient, entering orders and documentation    Part of this note may be an electronic transcription/translation of spoken language to printed text using the Dragon Dictation System.    Transcribed from ambient dictation for HEIKE Trimble by Paula NAZARIO.  12/16/21   19:22 EST    Patient verbalized consent to the visit recording.  I have personally performed the services described in this document as transcribed by the above individual, and it is both accurate and complete.  HEIKE Trimble  12/17/2021  09:17 EST

## 2021-12-20 DIAGNOSIS — R21 RASH: ICD-10-CM

## 2021-12-20 RX ORDER — FLUOCINONIDE TOPICAL SOLUTION USP, 0.05% 0.5 MG/ML
SOLUTION TOPICAL
Qty: 60 ML | Refills: 1 | Status: SHIPPED | OUTPATIENT
Start: 2021-12-20 | End: 2023-02-06 | Stop reason: SDUPTHER

## 2022-01-07 RX ORDER — METOPROLOL TARTRATE 50 MG/1
50 TABLET, FILM COATED ORAL EVERY 12 HOURS
Qty: 60 TABLET | Refills: 0 | Status: SHIPPED | OUTPATIENT
Start: 2022-01-07 | End: 2022-02-24 | Stop reason: SDUPTHER

## 2022-02-24 RX ORDER — METOPROLOL TARTRATE 50 MG/1
50 TABLET, FILM COATED ORAL EVERY 12 HOURS
Qty: 60 TABLET | Refills: 0 | Status: SHIPPED | OUTPATIENT
Start: 2022-02-24 | End: 2022-03-24 | Stop reason: SDUPTHER

## 2022-03-24 ENCOUNTER — OFFICE VISIT (OUTPATIENT)
Dept: CARDIOLOGY | Facility: CLINIC | Age: 67
End: 2022-03-24

## 2022-03-24 VITALS
OXYGEN SATURATION: 94 % | BODY MASS INDEX: 26.01 KG/M2 | HEART RATE: 76 BPM | HEIGHT: 69 IN | WEIGHT: 175.6 LBS | DIASTOLIC BLOOD PRESSURE: 82 MMHG | SYSTOLIC BLOOD PRESSURE: 146 MMHG

## 2022-03-24 DIAGNOSIS — I48.3 TYPICAL ATRIAL FLUTTER: Primary | ICD-10-CM

## 2022-03-24 PROCEDURE — 99213 OFFICE O/P EST LOW 20 MIN: CPT | Performed by: PHYSICIAN ASSISTANT

## 2022-03-24 PROCEDURE — 93000 ELECTROCARDIOGRAM COMPLETE: CPT | Performed by: PHYSICIAN ASSISTANT

## 2022-03-24 RX ORDER — TRIAMTERENE AND HYDROCHLOROTHIAZIDE 37.5; 25 MG/1; MG/1
1 TABLET ORAL DAILY
Qty: 30 TABLET | Refills: 6 | Status: SHIPPED | OUTPATIENT
Start: 2022-03-24 | End: 2023-03-06

## 2022-03-24 RX ORDER — METOPROLOL TARTRATE 50 MG/1
50 TABLET, FILM COATED ORAL EVERY 12 HOURS
Qty: 60 TABLET | Refills: 6 | Status: SHIPPED | OUTPATIENT
Start: 2022-03-24 | End: 2023-01-09

## 2022-03-24 NOTE — PROGRESS NOTES
"                   Cardiac Electrophysiology Outpatient Follow Up Note            [facility]    Follow Up Office Visit      Stepan Gaines  [id number]  01/18/2020    Primary Care Physician: John Argueta PA        Subjective     Chief Complaint:   Diagnoses and all orders for this visit:    1. Typical atrial flutter (HCC) (Primary)    Other orders  -     metoprolol tartrate (LOPRESSOR) 50 MG tablet; Take 1 tablet by mouth Every 12 (Twelve) Hours. Must make appointment for refills.  Dispense: 60 tablet; Refill: 6  -     triamterene-hydrochlorothiazide (MAXZIDE-25) 37.5-25 MG per tablet; Take 1 tablet by mouth Daily.  Dispense: 30 tablet; Refill: 6      Chief Complaint   Patient presents with   • Atrial Fibrillation   • Syncope,     Problem List:      1. Typical atrial flutter  a. CHADSVASc = 1  b. EPS with catheter ablation of atrial flutter 6/10/2020 per Dr. Neha fitzgerald Eliquis discontinued in July 2020  2. Paroxysmal SVT  a. Documented short runs of atrial tachycardia ~ 2017  b. Normal treadmill stress 6/29/2017  c. Maintained on metoprolol therapy  3. Essential hypertension  4. Palpitations  5. Syncope  6. COPD  7. Chronic pain  8. Tobacco abuse  9. Insomnia      History of Present Illness:   Stepan Gaines is a 66 y.o. male who presents for follow-up of documented typical atrial flutter now status post catheter ablation in June 2020.  Patient reports overall has been doing well.    He has had no tachypalpitations.  He states his blood pressure sometimes labile up-and-down he only takes his diuretic \"as needed\".  He unfortunately continues smoke cigarettes.  He does states he tries to exercise he is having no angina symptoms when he exercises on a regular basis.  He does admit to having some shortness of breath that he attributes to his ongoing smoking.  Past Medical History:   Past Medical History:   Diagnosis Date   • A-fib (HCC)    • Anxiety    • Arthritis    • " Cancer (HCC)     melanoma back   • Cataract    • COPD (chronic obstructive pulmonary disease) (HCC)    • Fainting    • Hypertension        Past Surgical History:   Past Surgical History:   Procedure Laterality Date   • CARDIAC ELECTROPHYSIOLOGY PROCEDURE N/A 6/10/2020    Procedure: EP/Ablation Atypical AFL w ICE & SJM or Carto mapping, ASAP, pt has no meds that need held;  Surgeon: Beto Solomon DO;  Location: [facility] EP INVASIVE LOCATION;  Service: Cardiology;  Laterality: N/A;   • CATARACT EXTRACTION      right eye   • ELBOW ARTHROSCOPY Left     left elbow twice   • KNEE ARTHROSCOPY Right    • SHOULDER ARTHROSCOPY Right     left and right but had left shoulder surgery twice   • TOE ARTHROPLASTY Right        Family History:   Family History   Problem Relation Age of Onset   • Hyperlipidemia Mother    • Lung cancer Father    • Diabetes Father    • Kidney failure Father        Social History:   Social History     Socioeconomic History   • Marital status:    Tobacco Use   • Smoking status: Current Every Day Smoker     Packs/day: 0.50     Types: Cigarettes   • Smokeless tobacco: Never Used   Substance and Sexual Activity   • Alcohol use: Not Currently     Comment: 7-10 drinks/week   • Drug use: Not Currently     Frequency: 7.0 times per week     Types: Marijuana     Comment: daily   • Sexual activity: Defer       Medications:     Current Outpatient Medications:   •  ALPRAZolam (Xanax) 1 MG tablet, Take 1 tablet by mouth Every Night., Disp: 30 tablet, Rfl: 5  •  fluocinonide (LIDEX) 0.05 % external solution, APPLY TOPICALLY TO THE APPROPRIATE AREA AS DIRECTED  TWICE A DAY, Disp: 60 mL, Rfl: 1  •  gentamicin (GARAMYCIN) 0.3 % ophthalmic solution, ADMINISTER ONE DROP INTO BOTH EYES EVERY 4 HOURS, Disp: 5 each, Rfl: 1  •  ipratropium (ATROVENT) 0.03 % nasal spray, 2 sprays into the nostril(s) as directed by provider Every 12 (Twelve) Hours., Disp: 30 mL, Rfl: 12  •  metoprolol tartrate (LOPRESSOR) 50 MG tablet,  "Take 1 tablet by mouth Every 12 (Twelve) Hours. Must make appointment for refills., Disp: 60 tablet, Rfl: 6  •  triamterene-hydrochlorothiazide (MAXZIDE-25) 37.5-25 MG per tablet, Take 1 tablet by mouth Daily., Disp: 30 tablet, Rfl: 6  •  ketorolac (Acular) 0.5 % ophthalmic solution, Administer 1 drop to both eyes 4 (Four) Times a Day., Disp: 30 mL, Rfl: 1    Allergies:   No Known Allergies    Objective   Vital Signs:   Vitals:    03/24/22 1120   BP: 146/82   BP Location: Right arm   Patient Position: Sitting   Pulse: 76   SpO2: 94%   Weight: 79.7 kg (175 lb 9.6 oz)   Height: 175.3 cm (69\")       PHYSICAL EXAM  General appearance: Awake, alert, cooperative  Head: Normocephalic, without obvious abnormality, atraumatic  Eyes: Conjunctivae/corneas clear, EOMs intact  Neck: no adenopathy, no carotid bruit, no JVD and thyroid: not enlarged  Lungs: clear to auscultation bilaterally and no rhonchi or crackles\", ' symmetric  Heart: regular rate and rhythm, S1, S2 normal, no murmur, click, rub or gallop  Abdomen: Soft, non-tender, bowel sounds normal,  no organomegaly  Extremities: extremities normal, atraumatic, no cyanosis or edema  Skin: Skin color, turgor normal, no rashes or lesions  Neurologic: Grossly normal     Lab Results   Component Value Date    GLUCOSE 87 10/01/2020    CALCIUM 8.8 10/01/2020     10/01/2020    K 3.8 10/01/2020    CO2 23.0 10/01/2020     10/01/2020    BUN 7 (L) 10/01/2020    CREATININE 0.80 10/01/2020    EGFRIFNONA 98 10/01/2020    BCR 8.8 10/01/2020    ANIONGAP 16.0 (H) 10/01/2020     Lab Results   Component Value Date    WBC 10.39 10/01/2020    HGB 13.4 10/01/2020    HCT 40.5 10/01/2020    .0 (H) 10/01/2020     10/01/2020     No results found for: INR, PROTIME  Lab Results   Component Value Date    TSH 0.959 05/18/2020       ECG 12 Lead    Date/Time: 3/24/2022 1:14 PM  Performed by: Steven Bliss PA  Authorized by: Steven Bliss PA   Rhythm: sinus " rhythm  Rate: normal  Conduction: conduction normal  ST Segments: ST segments normal  T Waves: T waves normal  QRS axis: normal  Other: no other findings    Clinical impression: normal ECG          Assessment & Plan    Diagnoses and all orders for this visit:    1. Typical atrial flutter (HCC) (Primary)    Other orders  -     metoprolol tartrate (LOPRESSOR) 50 MG tablet; Take 1 tablet by mouth Every 12 (Twelve) Hours. Must make appointment for refills.  Dispense: 60 tablet; Refill: 6  -     triamterene-hydrochlorothiazide (MAXZIDE-25) 37.5-25 MG per tablet; Take 1 tablet by mouth Daily.  Dispense: 30 tablet; Refill: 6         Diagnosis Plan   1. Typical atrial flutter (CMS/HCC)   status post catheter ablation June 2020  no recurrent sustained palpitations  Eliquis probably discontinued 30 days post procedure.     2. Essential hypertension   recommend taking Maxide daily basis monitor blood pressure closely.  Continue metoprolol therapy as prescribed.     3. Palpitations   no significant recurrence.  Continue metoprolol therapy     4. SVT (supraventricular tachycardia) (CMS/HCC)   no recurrent sustained palpitations  Continue metoprolol therapy     Continue current medical therapy to focus on risk factors for coronary disease such as stop smoking blood pressure control.  Return to follow-up her office in 1 year sooner as needed.    Electronically signed by HEIKE Hall, 03/24/22, 1:15 PM EDT..

## 2022-05-14 ENCOUNTER — HOSPITAL ENCOUNTER (EMERGENCY)
Facility: HOSPITAL | Age: 67
Discharge: HOME | End: 2022-05-14
Attending: EMERGENCY MEDICINE | Admitting: EMERGENCY MEDICINE

## 2022-05-14 ENCOUNTER — APPOINTMENT (OUTPATIENT)
Dept: GENERAL RADIOLOGY | Facility: HOSPITAL | Age: 67
End: 2022-05-14

## 2022-05-14 VITALS
WEIGHT: 168 LBS | HEIGHT: 69 IN | OXYGEN SATURATION: 93 % | TEMPERATURE: 98.4 F | RESPIRATION RATE: 18 BRPM | SYSTOLIC BLOOD PRESSURE: 138 MMHG | DIASTOLIC BLOOD PRESSURE: 72 MMHG | BODY MASS INDEX: 24.88 KG/M2 | HEART RATE: 84 BPM

## 2022-05-14 DIAGNOSIS — S93.401A SPRAIN OF RIGHT ANKLE, UNSPECIFIED LIGAMENT, INITIAL ENCOUNTER: ICD-10-CM

## 2022-05-14 DIAGNOSIS — S90.30XA CONTUSION OF FOOT, UNSPECIFIED LATERALITY, INITIAL ENCOUNTER: ICD-10-CM

## 2022-05-14 DIAGNOSIS — S93.601A SPRAIN OF RIGHT FOOT, INITIAL ENCOUNTER: Primary | ICD-10-CM

## 2022-05-14 PROCEDURE — 73610 X-RAY EXAM OF ANKLE: CPT

## 2022-05-14 PROCEDURE — 73630 X-RAY EXAM OF FOOT: CPT

## 2022-05-14 PROCEDURE — 99283 EMERGENCY DEPT VISIT LOW MDM: CPT

## 2022-05-14 RX ORDER — HYDROCODONE BITARTRATE AND ACETAMINOPHEN 7.5; 325 MG/1; MG/1
1 TABLET ORAL EVERY 6 HOURS PRN
Qty: 13 TABLET | Refills: 0 | Status: SHIPPED | OUTPATIENT
Start: 2022-05-14 | End: 2022-05-23

## 2022-05-14 RX ORDER — HYDROCODONE BITARTRATE AND ACETAMINOPHEN 7.5; 325 MG/1; MG/1
1 TABLET ORAL ONCE
Status: COMPLETED | OUTPATIENT
Start: 2022-05-14 | End: 2022-05-14

## 2022-05-14 RX ADMIN — HYDROCODONE BITARTRATE AND ACETAMINOPHEN 1 TABLET: 7.5; 325 TABLET ORAL at 15:10
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
## 2022-05-18 ENCOUNTER — TELEPHONE (OUTPATIENT)
Dept: FAMILY MEDICINE CLINIC | Facility: CLINIC | Age: 67
End: 2022-05-18

## 2022-05-18 NOTE — TELEPHONE ENCOUNTER
Caller: Stepan Gaines    Relationship to patient: Self    Best call back number: [phone]    Patient is needing: A HOSPITAL FOLLOW UP APPOINTMENT. SCHEDULE BETWEEN HOURS OF 11AM-3PM. PATIENT WAS SEEN IN ER ON 05/14/2022 AT [facility]

## 2022-05-23 ENCOUNTER — OFFICE VISIT (OUTPATIENT)
Dept: FAMILY MEDICINE CLINIC | Facility: CLINIC | Age: 67
End: 2022-05-23

## 2022-05-23 VITALS
DIASTOLIC BLOOD PRESSURE: 68 MMHG | BODY MASS INDEX: 25.18 KG/M2 | HEART RATE: 72 BPM | TEMPERATURE: 97.2 F | OXYGEN SATURATION: 99 % | HEIGHT: 69 IN | WEIGHT: 170 LBS | SYSTOLIC BLOOD PRESSURE: 124 MMHG

## 2022-05-23 DIAGNOSIS — S93.401A SPRAIN OF RIGHT ANKLE, UNSPECIFIED LIGAMENT, INITIAL ENCOUNTER: ICD-10-CM

## 2022-05-23 DIAGNOSIS — F41.1 GAD (GENERALIZED ANXIETY DISORDER): Primary | ICD-10-CM

## 2022-05-23 DIAGNOSIS — F41.1 GAD (GENERALIZED ANXIETY DISORDER): ICD-10-CM

## 2022-05-23 PROCEDURE — 99214 OFFICE O/P EST MOD 30 MIN: CPT | Performed by: PHYSICIAN ASSISTANT

## 2022-05-23 RX ORDER — CETIRIZINE HYDROCHLORIDE 10 MG/1
10 TABLET ORAL DAILY
COMMUNITY

## 2022-05-23 RX ORDER — ALPRAZOLAM 1 MG/1
1 TABLET ORAL NIGHTLY
Qty: 30 TABLET | Refills: 5 | Status: SHIPPED | OUTPATIENT
Start: 2022-05-23 | End: 2022-11-10 | Stop reason: SDUPTHER
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
## 2022-08-30 DIAGNOSIS — Z12.11 COLON CANCER SCREENING: Primary | ICD-10-CM

## 2022-10-27 ENCOUNTER — NURSE TRIAGE (OUTPATIENT)
Dept: CALL CENTER | Facility: HOSPITAL | Age: 67
End: 2022-10-27

## 2022-10-27 ENCOUNTER — OFFICE VISIT (OUTPATIENT)
Dept: FAMILY MEDICINE CLINIC | Facility: CLINIC | Age: 67
End: 2022-10-27

## 2022-10-27 VITALS
BODY MASS INDEX: 26.45 KG/M2 | DIASTOLIC BLOOD PRESSURE: 66 MMHG | WEIGHT: 178.6 LBS | SYSTOLIC BLOOD PRESSURE: 120 MMHG | HEART RATE: 74 BPM | RESPIRATION RATE: 16 BRPM | TEMPERATURE: 97.8 F | OXYGEN SATURATION: 98 % | HEIGHT: 69 IN

## 2022-10-27 DIAGNOSIS — M25.512 CHRONIC PAIN OF BOTH SHOULDERS: ICD-10-CM

## 2022-10-27 DIAGNOSIS — Z00.00 WELCOME TO MEDICARE PREVENTIVE VISIT: Primary | ICD-10-CM

## 2022-10-27 DIAGNOSIS — M25.511 CHRONIC PAIN OF BOTH SHOULDERS: ICD-10-CM

## 2022-10-27 DIAGNOSIS — F41.9 ANXIETY: ICD-10-CM

## 2022-10-27 DIAGNOSIS — G89.29 CHRONIC PAIN OF BOTH SHOULDERS: ICD-10-CM

## 2022-10-27 PROCEDURE — G0402 INITIAL PREVENTIVE EXAM: HCPCS | Performed by: PHYSICIAN ASSISTANT

## 2022-10-27 PROCEDURE — 1170F FXNL STATUS ASSESSED: CPT | Performed by: PHYSICIAN ASSISTANT

## 2022-10-27 PROCEDURE — 1125F AMNT PAIN NOTED PAIN PRSNT: CPT | Performed by: PHYSICIAN ASSISTANT

## 2022-10-27 PROCEDURE — 1159F MED LIST DOCD IN RCRD: CPT | Performed by: PHYSICIAN ASSISTANT

## 2022-10-27 RX ORDER — MULTIPLE VITAMINS W/ MINERALS TAB 9MG-400MCG
1 TAB ORAL DAILY
COMMUNITY

## 2022-10-27 RX ORDER — TOBRAMYCIN AND DEXAMETHASONE 3; 1 MG/ML; MG/ML
SUSPENSION/ DROPS OPHTHALMIC
COMMUNITY
Start: 2022-09-13

## 2022-10-27 RX ORDER — TRAMADOL HYDROCHLORIDE 50 MG/1
50 TABLET ORAL EVERY 6 HOURS PRN
Qty: 10 TABLET | Refills: 0 | Status: SHIPPED | OUTPATIENT
Start: 2022-10-27 | End: 2023-03-13

## 2022-10-27 RX ORDER — ESCITALOPRAM OXALATE 10 MG/1
10 TABLET ORAL DAILY
Qty: 30 TABLET | Refills: 3 | Status: SHIPPED | OUTPATIENT
Start: 2022-10-27 | End: 2023-03-27

## 2022-10-27 NOTE — PROGRESS NOTES
The ABCs of the Annual Wellness Visit  [facility] to Medicare Visit    Chief Complaint   Patient presents with   • Welcome To Medicare     Sees cardio. UTD on vaccines.    • Insomnia            Subjective   History of Present Illness:  Stepan Gaines is a 66 y.o. male who presents for a  Welcome to Medicare Visit.  Patient complains of bilateral shoulder pain on occasion, also trouble with breakthrough anxiety no SI/HI  The following portions of the patient's history were reviewed and   updated as appropriate: allergies, current medications, past family history, past medical history, past social history, past surgical history and problem list.    Compared to one year ago, the patient feels his physical   health is the same.    Compared to one year ago, the patient feels his mental   health is the same.    Recent Hospitalizations:  He was not admitted to the hospital during the last year.       Current Medical Providers:  Patient Care Team:  John Argueta PA as PCP - General (Family Medicine)    Outpatient Medications Prior to Visit   Medication Sig Dispense Refill   • ALPRAZolam (Xanax) 1 MG tablet Take 1 tablet by mouth Every Night. 30 tablet 5   • Ascorbic Acid (VITAMIN C PO) Take  by mouth Daily.     • cetirizine (zyrTEC) 10 MG tablet Take 10 mg by mouth Daily. OTC     • fluocinonide (LIDEX) 0.05 % external solution APPLY TOPICALLY TO THE APPROPRIATE AREA AS DIRECTED  TWICE A DAY 60 mL 1   • metoprolol tartrate (LOPRESSOR) 50 MG tablet Take 1 tablet by mouth Every 12 (Twelve) Hours. Must make appointment for refills. 60 tablet 6   • multivitamin with minerals (SENIOR MULTIVITAMIN PLUS PO) Take 1 tablet by mouth Daily.     • naphazoline-pheniramine (NAPHCON-A) 0.025-0.3 % ophthalmic solution 1 drop 4 (Four) Times a Day As Needed for Irritation.     • Riboflavin (VITAMIN B2 PO) Take  by mouth Daily.     • triamterene-hydrochlorothiazide (MAXZIDE-25) 37.5-25 MG per tablet Take 1 tablet by mouth Daily. 30  "tablet 6   • tobramycin-dexamethasone (TOBRADEX) 0.3-0.1 % ophthalmic suspension      • gentamicin (GARAMYCIN) 0.3 % ophthalmic solution ADMINISTER ONE DROP INTO BOTH EYES EVERY 4 HOURS 5 each 1   • ipratropium (ATROVENT) 0.03 % nasal spray 2 sprays into the nostril(s) as directed by provider Every 12 (Twelve) Hours. 30 mL 12   • ketorolac (Acular) 0.5 % ophthalmic solution Administer 1 drop to both eyes 4 (Four) Times a Day. 30 mL 1     No facility-administered medications prior to visit.       Opioid medication/s are on active medication list.  and I have evaluated his active treatment plan and pain score trends (see table).  Vitals:    10/27/22 1336   PainSc:   6     I have reviewed the chart for potential of high risk medication and harmful drug interactions in the elderly.            Aspirin is not on active medication list.  .    Patient Active Problem List   Diagnosis   • Syncope   • Palpitations   • Nicotine abuse   • SVT (supraventricular tachycardia) (HCC)   • Typical atrial flutter (HCC)   • Psychophysiological insomnia   • Excessive daytime sleepiness     Advance Care Planning  has NO advanced directive - information provided to the patient today    Review of Systems   Constitutional: Negative.    HENT: Negative.    Eyes: Negative.    Respiratory: Negative.    Cardiovascular: Negative.    Gastrointestinal: Negative.    Endocrine: Negative.    Genitourinary: Negative.    Musculoskeletal: Negative.    Skin: Negative.    Allergic/Immunologic: Negative.    Neurological: Negative.    Hematological: Negative.    Psychiatric/Behavioral: Negative.    All other systems reviewed and are negative.        Objective      Vitals:    10/27/22 1336   BP: 120/66   Pulse: 74   Resp: 16   Temp: 97.8 °F (36.6 °C)   TempSrc: Infrared   SpO2: 98%   Weight: 81 kg (178 lb 9.6 oz)   Height: 175.3 cm (69.02\")   PainSc:   6     BMI Readings from Last 1 Encounters:   10/27/22 26.36 kg/m²   BMI is above normal parameters. " Recommendations include: educational material and exercise counseling    Does the patient have evidence of cognitive impairment? No    Physical Exam  Vitals and nursing note reviewed.   Constitutional:       General: He is not in acute distress.     Appearance: Normal appearance. He is well-developed. He is not ill-appearing, toxic-appearing or diaphoretic.   HENT:      Head: Normocephalic and atraumatic.      Right Ear: External ear normal.      Left Ear: External ear normal.   Eyes:      Conjunctiva/sclera: Conjunctivae normal.      Pupils: Pupils are equal, round, and reactive to light.   Neck:      Thyroid: No thyromegaly.      Vascular: No carotid bruit.   Cardiovascular:      Rate and Rhythm: Normal rate and regular rhythm.      Pulses: Normal pulses.      Heart sounds: Normal heart sounds. No murmur heard.  Pulmonary:      Effort: Pulmonary effort is normal. No respiratory distress.      Breath sounds: Normal breath sounds.   Abdominal:      General: Bowel sounds are normal.      Palpations: Abdomen is soft. There is no mass.      Tenderness: There is no abdominal tenderness.   Musculoskeletal:         General: No swelling. Normal range of motion.      Cervical back: Normal range of motion and neck supple.   Lymphadenopathy:      Cervical: No cervical adenopathy.   Skin:     General: Skin is warm and dry.      Findings: No lesion or rash.   Neurological:      Mental Status: He is alert and oriented to person, place, and time.      Cranial Nerves: No cranial nerve deficit.      Sensory: No sensory deficit.      Motor: No weakness.      Coordination: Coordination normal.      Gait: Gait normal.      Deep Tendon Reflexes: Reflexes are normal and symmetric.   Psychiatric:         Mood and Affect: Mood normal.         Behavior: Behavior normal.         Thought Content: Thought content normal.         Judgment: Judgment normal.              Procedures       HEALTH RISK ASSESSMENT    Smoking Status:  Social History      Tobacco Use   Smoking Status Every Day   • Packs/day: 0.50   • Types: Cigarettes   Smokeless Tobacco Never     Alcohol Consumption:  Social History     Substance and Sexual Activity   Alcohol Use Not Currently    Comment: 7-10 drinks/week       Fall Risk Screen:    BILL Fall Risk Assessment was completed, and patient is at MODERATE risk for falls. Assessment completed on:10/27/2022    Depression Screen:   PHQ-2/PHQ-9 Depression Screening 10/27/2022   Retired PHQ-9 Total Score -   Retired Total Score -   Little Interest or Pleasure in Doing Things 0-->not at all   Feeling Down, Depressed or Hopeless 0-->not at all   Trouble Falling or Staying Asleep, or Sleeping Too Much 0-->not at all   Feeling Tired or Having Little Energy 0-->not at all   Poor Appetite or Overeating 0-->not at all   Feeling Bad about Yourself - or that You are a Failure or Have Let Yourself or Your Family Down 0-->not at all   Trouble Concentrating on Things, Such as Reading the Newspaper or Watching Television 0-->not at all   Moving or Speaking So Slowly that Other People Could Have Noticed? Or the Opposite - Being So Fidgety 0-->not at all   Thoughts that You Would be Better Off Dead or of Hurting Yourself in Some Way 0-->not at all   PHQ-9: Brief Depression Severity Measure Score 0   If You Checked Off Any Problems, How Difficult Have These Problems Made It For You to Do Your Work, Take Care of Things at Home, or Get Along with Other People? not difficult at all       Health Habits and Functional and Cognitive Screening:  Functional & Cognitive Status 10/27/2022   Do you have difficulty preparing food and eating? No   Do you have difficulty bathing yourself, getting dressed or grooming yourself? No   Do you have difficulty using the toilet? No   Do you have difficulty moving around from place to place? No   Do you have trouble with steps or getting out of a bed or a chair? No   Current Diet Well Balanced Diet   Dental Exam Up to date    Eye Exam Up to date   Exercise (times per week) 7 times per week   Current Exercises Include Walking;Bicycling Outdoors   Do you need help using the phone?  No   Are you deaf or do you have serious difficulty hearing?  No   Do you need help with transportation? No   Do you need help shopping? No   Do you need help preparing meals?  No   Do you need help with housework?  No   Do you need help with laundry? No   Do you need help taking your medications? No   Do you need help managing money? No   Do you ever drive or ride in a car without wearing a seat belt? No   Have you felt unusual stress, anger or loneliness in the last month? No   Who do you live with? (No Data)   If you need help, do you have trouble finding someone available to you? No   Have you been bothered in the last four weeks by sexual problems? No   Do you have difficulty concentrating, remembering or making decisions? No       Visual Acuity:    No results found.    Age-appropriate Screening Schedule:  Refer to the list below for future screening recommendations based on patient's age, sex and/or medical conditions. Orders for these recommended tests are listed in the plan section. The patient has been provided with a written plan.    Health Maintenance   Topic Date Due   • ZOSTER VACCINE (1 of 2) Never done   • INFLUENZA VACCINE  03/31/2023 (Originally 8/1/2022)   • TDAP/TD VACCINES (2 - Td or Tdap) 04/08/2027          Assessment & Plan     CMS Preventative Services Quick Reference  Risk Factors Identified During Encounter  Cardiovascular Disease  Immunizations Discussed/Encouraged (specific Immunizations; Influenza  The above risks/problems have been discussed with the patient.  Pertinent information has been shared with the patient in the After Visit Summary.  Follow up plans and orders are seen below in the Assessment/Plan Section.    Diagnoses and all orders for this visit:    1. Welcome to Medicare preventive visit (Primary)  -     Comprehensive  Metabolic Panel; Future  -     Lipid Panel; Future  -     TSH; Future  -     CBC & Differential; Future    2. Anxiety  -     escitalopram (Lexapro) 10 MG tablet; Take 1 tablet by mouth Daily.  Dispense: 30 tablet; Refill: 3    3. Chronic pain of both shoulders  -     traMADol (ULTRAM) 50 MG tablet; Take 1 tablet by mouth Every 6 (Six) Hours As Needed for Moderate Pain.  Dispense: 10 tablet; Refill: 0      Preventive medicine discussed, diet, exercise, healthy living discussed at length.  Discussed nutrition, physical activity, healthy weight, injury prevention, misuse of tobacco, alcohol and drugs, dental health, mental health, immunizations, screening    Part of this note may be an electronic transcription/translation of spoken language to printed text using the Dragon Dictation System.    Follow Up:  No follow-ups on file.     An After Visit Summary and PPPS were given to the patient.

## 2022-10-28 ENCOUNTER — APPOINTMENT (OUTPATIENT)
Dept: GENERAL RADIOLOGY | Facility: HOSPITAL | Age: 67
End: 2022-10-28

## 2022-10-28 ENCOUNTER — HOSPITAL ENCOUNTER (EMERGENCY)
Facility: HOSPITAL | Age: 67
Discharge: HOME | End: 2022-10-28
Attending: EMERGENCY MEDICINE | Admitting: EMERGENCY MEDICINE

## 2022-10-28 VITALS
SYSTOLIC BLOOD PRESSURE: 138 MMHG | DIASTOLIC BLOOD PRESSURE: 83 MMHG | HEIGHT: 69 IN | HEART RATE: 66 BPM | WEIGHT: 170 LBS | RESPIRATION RATE: 16 BRPM | OXYGEN SATURATION: 97 % | TEMPERATURE: 97.4 F | BODY MASS INDEX: 25.18 KG/M2
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
## 2022-10-28 DIAGNOSIS — S63.502A LEFT WRIST SPRAIN, INITIAL ENCOUNTER: Primary | ICD-10-CM

## 2022-10-28 PROCEDURE — 73110 X-RAY EXAM OF WRIST: CPT

## 2022-10-28 PROCEDURE — 73090 X-RAY EXAM OF FOREARM: CPT

## 2022-10-28 PROCEDURE — 99283 EMERGENCY DEPT VISIT LOW MDM: CPT

## 2022-10-28 RX ORDER — ACETAMINOPHEN 500 MG
1000 TABLET ORAL ONCE
Status: DISCONTINUED | OUTPATIENT
Start: 2022-10-28 | End: 2022-10-28

## 2022-10-28 RX ORDER — ACETAMINOPHEN 500 MG
1000 TABLET ORAL EVERY 6 HOURS PRN
Qty: 30 TABLET | Refills: 0 | Status: SHIPPED | OUTPATIENT
Start: 2022-10-28

## 2022-10-28 RX ORDER — HYDROCODONE BITARTRATE AND ACETAMINOPHEN 5; 325 MG/1; MG/1
1 TABLET ORAL ONCE
Status: COMPLETED | OUTPATIENT
Start: 2022-10-28 | End: 2022-10-28

## 2022-10-28 RX ORDER — KETOROLAC TROMETHAMINE 15 MG/ML
15 INJECTION, SOLUTION INTRAMUSCULAR; INTRAVENOUS ONCE
Status: DISCONTINUED | OUTPATIENT
Start: 2022-10-28 | End: 2022-10-28 | Stop reason: HOSPADM

## 2022-10-28 RX ADMIN — HYDROCODONE BITARTRATE AND ACETAMINOPHEN 1 TABLET: 5; 325 TABLET ORAL at 10:52

## 2022-10-28 NOTE — TELEPHONE ENCOUNTER
"Caller reported got up and tripped over shoe and fell onto Left arm, thinks may have broken wrist and arm. Has taken ibuprofen, elevated, and applied ice. 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 Reinforced to caller needs to be assessed as soon as possible, reviewed complications with caller who verbalized understanding.    Reason for Disposition  • [1] SEVERE pain AND [2] not improved 2 hours after pain medicine/ice packs    Additional Information  • Negative: Serious injury with multiple fractures (broken bones)  • Negative: [1] Major bleeding (e.g., actively dripping or spurting) AND [2] can't be stopped  • Negative: Sounds like a life-threatening emergency to the triager  • Negative: Wound looks infected  • Negative: Arm pain from overuse (e.g., sports, lifting, physical work)  • Negative: Arm pain not from an injury  • Negative: Shoulder injury is main concern  • Negative: Elbow injury is main concern  • Negative: Wrist or hand injury is main concern  • Negative: Finger injury is main concern  • Negative: Bullet wound, stabbed by knife, or other serious penetrating wound  • Negative: Looks like a broken bone (crooked or deformed)  • Negative: Looks like a dislocated joint  • Negative: Can't move injured arm at all  • Negative: [1] Bleeding AND [2] won't stop after 10 minutes of direct pressure (using correct technique)  • Negative: Skin is split open or gaping (or length > 1/2 inch or 12 mm)  • Negative: [1] Dirt in the wound AND [2] not removed with 15 minutes of scrubbing  • Negative: Sounds like a serious injury to the triager    Answer Assessment - Initial Assessment Questions  1. MECHANISM: \"How did the injury happen?\"      fall  2. ONSET: \"When did the injury happen?\" (Minutes or hours ago)       7pm  3. LOCATION: \"Where is the injury located?\" \"Which arm?\"      Left arm  4. APPEARANCE of INJURY: " "\"What does the injury look like?\"       Swollen, no discoloration yet  5. SEVERITY: \"Can you use the arm normally?\"       yes  6. SWELLING or BRUISING: \"is there any swelling or bruising?\" If Yes, ask: \"How large is it? (e.g., inches, centimeters)       Starting to swell  7. PAIN: \"Is there pain?\" If Yes, ask: \"How bad is the pain?\"    (Scale 1-10; or mild, moderate, severe)    - NONE (0): no pain.    - MILD (1-3): doesn't interfere with normal activities    - MODERATE (4-7): interferes with normal activities (e.g., work or school) or awakens from sleep    - SEVERE (8-10): excruciating pain, unable to do any normal activities, unable to hold a cup of water      severe  8. TETANUS: For any breaks in the skin, ask: \"When was the last tetanus booster?\"      yes  9. OTHER SYMPTOMS: \"Do you have any other symptoms?\"  (e.g., numbness in hand)      Denied in hand, reports n/t to shoulder from old injury  10. PREGNANCY: \"Is there any chance you are pregnant?\" \"When was your last menstrual period?\"        n/a    Protocols used: ARM INJURY-ADULT-      "

## 2022-11-10 DIAGNOSIS — F41.1 GAD (GENERALIZED ANXIETY DISORDER): ICD-10-CM

## 2022-11-10 NOTE — TELEPHONE ENCOUNTER
Caller: Stepan Gaines    Relationship: Self     Best call back number: [phone]    Requested Prescriptions:   Requested Prescriptions     Pending Prescriptions Disp Refills   • ALPRAZolam (Xanax) 1 MG tablet 30 tablet 5     Sig: Take 1 tablet by mouth Every Night.        Pharmacy where request should be sent: [facility] [id number] [address] - [phone]  - [phone] FX     Additional details provided by patient:  PATIENT HAS 3 DAYS LEFT    Does the patient have less than a 3 day supply:  [x] Yes  [] No    Paula Davis Rep   11/10/22 11:58 EST

## 2022-11-11 RX ORDER — ALPRAZOLAM 1 MG/1
1 TABLET ORAL NIGHTLY
Qty: 30 TABLET | Refills: 0 | Status: SHIPPED | OUTPATIENT
Start: 2022-11-11 | End: 2022-11-28 | Stop reason: SDUPTHER

## 2022-11-28 DIAGNOSIS — F41.1 GAD (GENERALIZED ANXIETY DISORDER): ICD-10-CM

## 2022-11-28 RX ORDER — ALPRAZOLAM 1 MG/1
1 TABLET ORAL NIGHTLY
Qty: 30 TABLET | Refills: 3 | Status: SHIPPED | OUTPATIENT
Start: 2022-11-28 | End: 2023-03-13 | Stop reason: SDUPTHER

## 2022-11-28 RX ORDER — ALPRAZOLAM 1 MG/1
1 TABLET ORAL NIGHTLY
Qty: 30 TABLET | Refills: 0 | Status: CANCELLED | OUTPATIENT
Start: 2022-11-28

## 2022-11-28 NOTE — TELEPHONE ENCOUNTER
Caller: Stepan Gaines    Relationship: Self    Best call back number: [phone]    Requested Prescriptions:   Requested Prescriptions     Pending Prescriptions Disp Refills   • ALPRAZolam (Xanax) 1 MG tablet 30 tablet 0     Sig: Take 1 tablet by mouth Every Night.        Pharmacy where request should be sent: [facility] [id number] [address] - [phone]  - [phone] FX     Additional details provided by patient: PATIENT WAS SEEN ON 10/27/22, HE NEEDS A NEW PRESCRIPTION WITH 6 REFILLS PER USUAL. PLEASE ADVISE      Does the patient have less than a 3 day supply:  [x] Yes  [] No    Paula Beebe Rep   11/28/22 11:26 EST

## 2023-01-07 DIAGNOSIS — G89.29 CHRONIC PAIN OF BOTH SHOULDERS: ICD-10-CM

## 2023-01-07 DIAGNOSIS — M25.511 CHRONIC PAIN OF BOTH SHOULDERS: ICD-10-CM

## 2023-01-07 DIAGNOSIS — M25.512 CHRONIC PAIN OF BOTH SHOULDERS: ICD-10-CM

## 2023-01-09 RX ORDER — METOPROLOL TARTRATE 50 MG/1
TABLET, FILM COATED ORAL
Qty: 180 TABLET | Refills: 1 | Status: SHIPPED | OUTPATIENT
Start: 2023-01-09

## 2023-01-09 RX ORDER — TRAMADOL HYDROCHLORIDE 50 MG/1
TABLET ORAL
Qty: 10 TABLET | OUTPATIENT
Start: 2023-01-09

## 2023-02-06 ENCOUNTER — TELEPHONE (OUTPATIENT)
Dept: FAMILY MEDICINE CLINIC | Facility: CLINIC | Age: 68
End: 2023-02-06
Payer: MEDICARE

## 2023-02-06 DIAGNOSIS — R21 RASH: ICD-10-CM

## 2023-02-06 RX ORDER — FLUOCINONIDE TOPICAL SOLUTION USP, 0.05% 0.5 MG/ML
SOLUTION TOPICAL
Qty: 60 ML | Refills: 5 | Status: SHIPPED | OUTPATIENT
Start: 2023-02-06

## 2023-02-06 NOTE — TELEPHONE ENCOUNTER
Caller: Stepan Gaines    Relationship: Self    Best call back number: [phone]    Requested Prescriptions:   Requested Prescriptions     Pending Prescriptions Disp Refills   • fluocinonide (LIDEX) 0.05 % external solution 60 mL 1        Pharmacy where request should be sent: [facility] [id number] [facility] [address] - [phone]  - [phone] FX     Additional details provided by patient: PATIENT IS COMPLETELY OUT OF THIS MEDICATION     Does the patient have less than a 3 day supply:  [x] Yes  [] No    Would you like a call back once the refill request has been completed: [x] Yes [] No    If the office needs to give you a call back, can they leave a voicemail: [x] Yes [] No    Paula Mason Rep   02/06/23 12:37 EST

## 2023-03-06 RX ORDER — TRIAMTERENE AND HYDROCHLOROTHIAZIDE 37.5; 25 MG/1; MG/1
TABLET ORAL
Qty: 30 TABLET | Refills: 4 | Status: SHIPPED | OUTPATIENT
Start: 2023-03-06

## 2023-03-08 ENCOUNTER — TELEPHONE (OUTPATIENT)
Dept: FAMILY MEDICINE CLINIC | Facility: CLINIC | Age: 68
End: 2023-03-08
Payer: MEDICARE

## 2023-03-08 DIAGNOSIS — M25.511 CHRONIC PAIN OF BOTH SHOULDERS: ICD-10-CM

## 2023-03-08 DIAGNOSIS — M25.512 CHRONIC PAIN OF BOTH SHOULDERS: ICD-10-CM

## 2023-03-08 DIAGNOSIS — M25.511 CHRONIC PAIN OF BOTH SHOULDERS: Primary | ICD-10-CM

## 2023-03-08 DIAGNOSIS — G89.29 CHRONIC PAIN OF BOTH SHOULDERS: Primary | ICD-10-CM

## 2023-03-08 DIAGNOSIS — G89.29 CHRONIC PAIN OF BOTH SHOULDERS: ICD-10-CM

## 2023-03-08 DIAGNOSIS — M25.512 CHRONIC PAIN OF BOTH SHOULDERS: Primary | ICD-10-CM

## 2023-03-08 RX ORDER — TRAMADOL HYDROCHLORIDE 50 MG/1
50 TABLET ORAL EVERY 6 HOURS PRN
Qty: 21 TABLET | Refills: 0 | Status: SHIPPED | OUTPATIENT
Start: 2023-03-08

## 2023-03-08 RX ORDER — TRAMADOL HYDROCHLORIDE 50 MG/1
TABLET ORAL
Qty: 10 TABLET | OUTPATIENT
Start: 2023-03-08

## 2023-03-08 NOTE — TELEPHONE ENCOUNTER
I confirmed with the pharmacy that they received the tramadol prescription for 21 pills.   Message left informing patient to allow the pharmacy some time to get it ready.    OKAY FOR HUB TO READ

## 2023-03-08 NOTE — TELEPHONE ENCOUNTER
"Caller: Stepan Gaines \"Tomas\"    Relationship: Self    Best call back number: [phone]    What medication are you requesting: XANAFLAX, OF TRAMADOL    What are your current symptoms: BODY HURTING ALL OVER , BACK , RIGHT KNEE,AND LEG IS  IN A LOT OF PAIN , HARD TO WALK    How long have you been experiencing symptoms: 03/07/23    Have you had these symptoms before:    [x] Yes  [] No    Have you been treated for these symptoms before:   [x] Yes  [] No    If a prescription is needed, what is your preferred pharmacy and phone number: [facility] [id number] - [place], [place] - [address] - [phone]  - [phone] FX     Additional notes: PLEASE CALL PATIENT WITH THE DETERMINATION OF HIS REQUEST.             "

## 2023-03-08 NOTE — TELEPHONE ENCOUNTER
"Caller: Stepan Gaines \"Tomas\"    Relationship: Self    Best call back number: [phone]    Requested Prescriptions:   Requested Prescriptions     Pending Prescriptions Disp Refills   • traMADol (ULTRAM) 50 MG tablet [Pharmacy Med Name: traMADol HCL 50MG TABLET] 10 tablet      Sig: TAKE ONE TABLET BY MOUTH EVERY 6 HOURS AS NEEDED FOR PAIN        Pharmacy where request should be sent: [facility] [id number] [place] [address] [address] - [phone]  - [phone] FX     Additional details provided by patient: PATIENT STATES THAT THE PHARMACY DOES NOT HAVE THIS MEDICATION AND IT NEED TO BE RESENT IN.    Does the patient have less than a 3 day supply:  [x] Yes  [] No    Would you like a call back once the refill request has been completed: [x] Yes [] No    If the office needs to give you a call back, can they leave a voicemail: [x] Yes [] No    Paula Montoya Rep   03/08/23 13:51 EST         "

## 2023-03-08 NOTE — TELEPHONE ENCOUNTER
PATIENT CALLED BACK IN FOR STATUS. PATIENT STATED HE TAKES CARE OF HIS ELDERLY MOTHER AND IS HAVING TROUBLE BECAUSE HE CAN BARELY WALK OR MOVE . REQUESTING MEDICATION AS SOON AS POSSIBLE TODAY IF POSSIBLE

## 2023-03-13 ENCOUNTER — TELEPHONE (OUTPATIENT)
Dept: FAMILY MEDICINE CLINIC | Facility: CLINIC | Age: 68
End: 2023-03-13

## 2023-03-13 ENCOUNTER — OFFICE VISIT (OUTPATIENT)
Dept: FAMILY MEDICINE CLINIC | Facility: CLINIC | Age: 68
End: 2023-03-13
Payer: MEDICARE

## 2023-03-13 VITALS
HEART RATE: 64 BPM | SYSTOLIC BLOOD PRESSURE: 130 MMHG | BODY MASS INDEX: 24.79 KG/M2 | TEMPERATURE: 98.1 F | DIASTOLIC BLOOD PRESSURE: 84 MMHG | RESPIRATION RATE: 20 BRPM | OXYGEN SATURATION: 99 % | HEIGHT: 69 IN | WEIGHT: 167.4 LBS

## 2023-03-13 DIAGNOSIS — Z12.11 SCREEN FOR COLON CANCER: ICD-10-CM

## 2023-03-13 DIAGNOSIS — F41.1 GAD (GENERALIZED ANXIETY DISORDER): ICD-10-CM

## 2023-03-13 DIAGNOSIS — G89.29 CHRONIC BILATERAL LOW BACK PAIN WITHOUT SCIATICA: ICD-10-CM

## 2023-03-13 DIAGNOSIS — F41.1 GAD (GENERALIZED ANXIETY DISORDER): Primary | ICD-10-CM

## 2023-03-13 DIAGNOSIS — M54.50 CHRONIC BILATERAL LOW BACK PAIN WITHOUT SCIATICA: ICD-10-CM

## 2023-03-13 PROCEDURE — 99213 OFFICE O/P EST LOW 20 MIN: CPT | Performed by: PHYSICIAN ASSISTANT

## 2023-03-13 RX ORDER — ALPRAZOLAM 1 MG/1
1 TABLET ORAL NIGHTLY
Qty: 30 TABLET | Refills: 3 | Status: SHIPPED | OUTPATIENT
Start: 2023-03-13 | End: 2023-04-06

## 2023-03-13 RX ORDER — TIZANIDINE HYDROCHLORIDE 4 MG/1
4 CAPSULE, GELATIN COATED ORAL 3 TIMES DAILY
Qty: 30 CAPSULE | Refills: 1 | Status: SHIPPED | OUTPATIENT
Start: 2023-03-13 | End: 2023-03-14 | Stop reason: CLARIF

## 2023-03-13 NOTE — PROGRESS NOTES
Subjective   Stepan Gaines is a 67 y.o. male  Anxiety      History of Present Illness  Patient is a very pleasant 67-year-old white male who comes in for anxiety needs refill on meds no problems or complaints no shortness of breath no chest pain no SI/HI anxiety is controlled.  Meds working well  The following portions of the patient's history were reviewed and updated as appropriate: allergies, current medications, past social history and problem list    Review of Systems   Constitutional: Negative.    HENT: Negative.    Eyes: Negative.    Respiratory: Negative.    Cardiovascular: Negative.    Gastrointestinal: Negative.    Endocrine: Negative.    Genitourinary: Negative.    Musculoskeletal: Negative.    Skin: Negative.    Allergic/Immunologic: Negative.    Neurological: Negative.    Hematological: Negative.    Psychiatric/Behavioral: The patient is nervous/anxious and is hyperactive.    All other systems reviewed and are negative.      Objective     Vitals:    03/13/23 1116   BP: 130/84   Pulse: 64   Resp: 20   Temp: 98.1 °F (36.7 °C)   SpO2: 99%       Physical Exam  Vitals and nursing note reviewed.   Constitutional:       General: He is not in acute distress.     Appearance: Normal appearance. He is well-developed. He is not ill-appearing, toxic-appearing or diaphoretic.   Neck:      Thyroid: No thyromegaly.      Vascular: No carotid bruit or JVD.   Cardiovascular:      Rate and Rhythm: Normal rate and regular rhythm.      Pulses: Normal pulses.      Heart sounds: Normal heart sounds. No murmur heard.  Pulmonary:      Effort: Pulmonary effort is normal. No respiratory distress.      Breath sounds: Normal breath sounds.   Abdominal:      Palpations: Abdomen is soft. There is no mass.      Tenderness: There is no abdominal tenderness.   Musculoskeletal:      Cervical back: Neck supple.   Lymphadenopathy:      Cervical: No cervical adenopathy.   Skin:     General: Skin is warm and dry.   Neurological:      Mental  Status: He is alert.      Sensory: No sensory deficit.         Assessment & Plan     Diagnoses and all orders for this visit:    1. EZIO (generalized anxiety disorder) (Primary)    2. Chronic bilateral low back pain without sciatica  -     TiZANidine (Zanaflex) 4 MG capsule; Take 1 capsule by mouth 3 (Three) Times a Day.  Dispense: 30 capsule; Refill: 1    3. Screen for colon cancer  -     Cologuard - Stool, Per Rectum; Future    Xanax 1 mg 3 times a day dispense 90x3 refills        As part of this patient's treatment plan, patient will be prescribed controlled substances. The patient has been made aware of appropriate use of such medications, including potential risk of somnolence, limited ability to drive and /or work safely, and potential for dependence or overdose. It has also been made clear that these medications are for use by this patient only, without concomitant use of alcohol or other substances unless prescribed.Controlled substance status of medication discussed with patient, discussed risks of medication including abuse potential and diversion potential and need to follow up for reevaluation appointment in order to receive further refills.    Part of this note may be an electronic transcription/translation of spoken language to printed text using the Dragon Dictation System.    I spent 15 minutes in patient care: Reviewing records prior to the visit, examining the patient, entering orders and documentation    Part of this note may be an electronic transcription/translation of spoken language to printed text using the Dragon Dictation System.    This [place] complaint Dr. Piedra

## 2023-03-13 NOTE — TELEPHONE ENCOUNTER
[id number] [place], [place] - [address] - [phone]  - [phone] FX    Relationship: Pharmacy    Best call back number: [phone]    What is the best time to reach you: 8 AM-8 PM   BREAK FOR LUNCH FROM 1:00-1:30PM    Who are you requesting to speak with (clinical staff, provider,  specific staff member): DR. BARNHART OR HIS NURSE    What was the call regarding: THE PRESCRIPTION FOR TiZANidine (Zanaflex) 4 MG  IS WRITTEN FOR THE CAPSULES. INSURANCE WON'T COVER THE CAPSULE BUT WILL COVER THE TABLETS. THEY ARE JUST NEEDING A VERBAL CHANGE FROM THE PROVIDER TO BE ABLE TO FILL THE PRESCRIPTION. PLEASE CALL BACK ASAP    Do you require a callback: YES ASAP

## 2023-03-14 ENCOUNTER — TELEPHONE (OUTPATIENT)
Dept: FAMILY MEDICINE CLINIC | Facility: CLINIC | Age: 68
End: 2023-03-14
Payer: MEDICARE

## 2023-03-14 RX ORDER — TIZANIDINE 4 MG/1
4 TABLET ORAL 3 TIMES DAILY
Qty: 30 TABLET | Refills: 1 | Status: SHIPPED | OUTPATIENT
Start: 2023-03-14

## 2023-03-14 NOTE — TELEPHONE ENCOUNTER
Pt's insurance will not cover capsules, but will cover tablets. Please call or resend to gwyn whyte.

## 2023-03-27 DIAGNOSIS — F41.9 ANXIETY: ICD-10-CM

## 2023-03-27 RX ORDER — ESCITALOPRAM OXALATE 10 MG/1
TABLET ORAL
Qty: 30 TABLET | Refills: 5 | Status: SHIPPED | OUTPATIENT
Start: 2023-03-27

## 2023-04-04 ENCOUNTER — READMISSION MANAGEMENT (OUTPATIENT)
Dept: CALL CENTER | Facility: HOSPITAL | Age: 68
End: 2023-04-04
Payer: MEDICARE

## 2023-04-04 ENCOUNTER — TELEPHONE (OUTPATIENT)
Dept: FAMILY MEDICINE CLINIC | Facility: CLINIC | Age: 68
End: 2023-04-04

## 2023-04-04 ENCOUNTER — TRANSITIONAL CARE MANAGEMENT TELEPHONE ENCOUNTER (OUTPATIENT)
Dept: CALL CENTER | Facility: HOSPITAL | Age: 68
End: 2023-04-04
Payer: MEDICARE

## 2023-04-04 NOTE — TELEPHONE ENCOUNTER
"  Caller: Stepan Gaines \"Tomas\"    Relationship to patient: Self    Best call back number: [phone]  New or established patient?  [] New  [x] Established    Date of discharge: 03/31/2023    Facility discharged from:  HOSPITAL     Diagnosis/Symptoms: SEIZURE     Length of stay (If applicable): 03/29/2023-03/31/2023        "

## 2023-04-04 NOTE — OUTREACH NOTE
Call Center TCM Note    Flowsheet Row Responses   [facility] patient discharged from? Non-   Does the patient have one of the following disease processes/diagnoses(primary or secondary)? Other   TCM attempt successful? Yes   Call start time 1109   Call end time 1115   Discharge diagnosis Seizure   Meds reviewed with patient/caregiver? Yes   Is the patient having any side effects they believe may be caused by any medication additions or changes? No   Does the patient have all medications ordered at discharge? Yes   Is the patient taking all medications as directed (includes completed medication regime)? Yes   Comments 4/6/23 at 10:30.    Does the patient have an appointment with their PCP within 7 days of discharge? Yes   Has home health visited the patient within 72 hours of discharge? N/A   Psychosocial issues? No   What is the patient's perception of their health status since discharge? Improving   Is the patient/caregiver able to teach back the hierarchy of who to call/visit for symptoms/problems? PCP, Specialist, Home health nurse, Urgent Care, ED, 911 Yes   If the patient is a current smoker, are they able to teach back resources for cessation? [id number]   TCM call completed? Yes   Call end time 1115   Would this patient benefit from a Referral to Amb Social Work? No   Is the patient interested in additional calls from an ambulatory ?  NOTE:  applies to high risk patients requiring additional follow-up. No          Emilia Hernadez RN    4/4/2023, 11:15 EDT

## 2023-04-04 NOTE — OUTREACH NOTE
Prep Survey    Flowsheet Row Responses   [facility] facility patient discharged from? Non-BH   Is LACE score < 7 ? Non-BH Discharge   Eligibility [facility]   Date of Discharge 03/31/23   Discharge Disposition Home or Self Care   Discharge diagnosis Seizure   Does the patient have one of the following disease processes/diagnoses(primary or secondary)? Other   Prep survey completed? Yes          Dasia CASTRO - Registered Nurse

## 2023-04-06 ENCOUNTER — OFFICE VISIT (OUTPATIENT)
Dept: FAMILY MEDICINE CLINIC | Facility: CLINIC | Age: 68
End: 2023-04-06
Payer: MEDICARE

## 2023-04-06 ENCOUNTER — LAB (OUTPATIENT)
Dept: FAMILY MEDICINE CLINIC | Facility: CLINIC | Age: 68
End: 2023-04-06
Payer: MEDICARE

## 2023-04-06 VITALS
WEIGHT: 168 LBS | OXYGEN SATURATION: 98 % | HEIGHT: 69 IN | HEART RATE: 93 BPM | DIASTOLIC BLOOD PRESSURE: 76 MMHG | BODY MASS INDEX: 24.88 KG/M2 | SYSTOLIC BLOOD PRESSURE: 118 MMHG

## 2023-04-06 DIAGNOSIS — E87.1 HYPONATREMIA: ICD-10-CM

## 2023-04-06 DIAGNOSIS — F10.10 ETOH ABUSE: ICD-10-CM

## 2023-04-06 DIAGNOSIS — M54.6 ACUTE BILATERAL THORACIC BACK PAIN: ICD-10-CM

## 2023-04-06 DIAGNOSIS — F41.1 GAD (GENERALIZED ANXIETY DISORDER): Primary | ICD-10-CM

## 2023-04-06 LAB
ANION GAP SERPL CALCULATED.3IONS-SCNC: 9.7 MMOL/L (ref 5–15)
BUN SERPL-MCNC: 15 MG/DL (ref 8–23)
BUN/CREAT SERPL: 14.7 (ref 7–25)
CALCIUM SPEC-SCNC: 9.9 MG/DL (ref 8.6–10.5)
CHLORIDE SERPL-SCNC: 98 MMOL/L (ref 98–107)
CO2 SERPL-SCNC: 23.3 MMOL/L (ref 22–29)
CREAT SERPL-MCNC: 1.02 MG/DL (ref 0.76–1.27)
EGFRCR SERPLBLD CKD-EPI 2021: 81.1 ML/MIN/1.73
ETHANOL BLD-MCNC: <10 MG/DL (ref 0–10)
GLUCOSE SERPL-MCNC: 100 MG/DL (ref 65–99)
POTASSIUM SERPL-SCNC: 4.6 MMOL/L (ref 3.5–5.2)
SODIUM SERPL-SCNC: 131 MMOL/L (ref 136–145)

## 2023-04-06 PROCEDURE — 80048 BASIC METABOLIC PNL TOTAL CA: CPT | Performed by: PHYSICIAN ASSISTANT

## 2023-04-06 PROCEDURE — 82077 ASSAY SPEC XCP UR&BREATH IA: CPT | Performed by: PHYSICIAN ASSISTANT

## 2023-04-06 RX ORDER — ALPRAZOLAM 1 MG/1
1 TABLET ORAL
COMMUNITY
End: 2023-04-06

## 2023-04-06 RX ORDER — METHOCARBAMOL 500 MG/1
500 TABLET, FILM COATED ORAL 4 TIMES DAILY
Qty: 20 TABLET | Refills: 0 | Status: SHIPPED | OUTPATIENT
Start: 2023-04-06

## 2023-04-06 RX ORDER — HYDROXYZINE PAMOATE 25 MG/1
25 CAPSULE ORAL 3 TIMES DAILY PRN
Qty: 30 CAPSULE | Refills: 0 | Status: SHIPPED | OUTPATIENT
Start: 2023-04-06

## 2023-04-06 NOTE — PROGRESS NOTES
Subjective   Stepan Gaines is a 67 y.o. male  Hospital Follow Up Visit (DISCHARGE DATE 03/31/2023 FROM  PATIENT HAD A SEIZURE /SCREENING PASSED 04/04/2023)      History of Present Illness  Patient is a pleasant 67-year-old white male who comes in for follow-up of alcohol withdrawal/seizure due to hyponatremia patient states he has been drinking due to stress, he does take Xanax 1 mg at bedtime to help him sleep but states of the last week he is not taking it.  Patient comes in for follow-up states he is going to AA and has stopped drinking patient is complaining of low back pain.  And back pain stiffness  The following portions of the patient's history were reviewed and updated as appropriate: allergies, current medications, past social history and problem list    Review of Systems   Constitutional: Negative for appetite change, diaphoresis, fatigue and unexpected weight change.   Eyes: Negative for visual disturbance.   Respiratory: Negative for cough, chest tightness and shortness of breath.    Cardiovascular: Negative for chest pain, palpitations and leg swelling.   Gastrointestinal: Negative for diarrhea, nausea and vomiting.   Endocrine: Negative for polydipsia, polyphagia and polyuria.   Skin: Negative for color change and rash.   Neurological: Negative for dizziness, syncope, weakness, light-headedness, numbness and headaches.       Objective     Vitals:    04/06/23 1019   BP: 118/76   Pulse: 93   SpO2: 98%       Physical Exam  Vitals and nursing note reviewed.   Constitutional:       General: He is not in acute distress.     Appearance: Normal appearance. He is well-developed. He is not ill-appearing, toxic-appearing or diaphoretic.   Neck:      Thyroid: No thyromegaly.      Vascular: No carotid bruit or JVD.   Cardiovascular:      Rate and Rhythm: Normal rate and regular rhythm.      Pulses: Normal pulses.      Heart sounds: Normal heart sounds. No murmur heard.  Pulmonary:      Effort: Pulmonary effort  is normal. No respiratory distress.      Breath sounds: Normal breath sounds.   Abdominal:      Palpations: Abdomen is soft. There is no mass.      Tenderness: There is no abdominal tenderness.   Musculoskeletal:      Cervical back: Neck supple.   Lymphadenopathy:      Cervical: No cervical adenopathy.   Skin:     General: Skin is warm and dry.   Neurological:      Mental Status: He is alert.      Sensory: No sensory deficit.         Assessment & Plan     Diagnoses and all orders for this visit:    1. EZIO (generalized anxiety disorder) (Primary)    2. Acute bilateral thoracic back pain  -     Basic metabolic panel; Future    3. ETOH abuse  -     Basic metabolic panel; Future  -     Basic metabolic panel; Future  -     Ethanol level; Future    4. Hyponatremia  -     Basic metabolic panel; Future  -     Basic metabolic panel; Future  -     Ethanol level; Future    Other orders  -     hydrOXYzine pamoate (Vistaril) 25 MG capsule; Take 1 capsule by mouth 3 (Three) Times a Day As Needed for Itching or Anxiety.  Dispense: 30 capsule; Refill: 0  -     methocarbamol (Robaxin) 500 MG tablet; Take 1 tablet by mouth 4 (Four) Times a Day.  Dispense: 20 tablet; Refill: 0    #1 Long discussion with patient about alcohol abuse and benzodiazepines he most recently got a Xanax prescription filled on 4/2/2023, patient states he has not been on medication for 1 week, we have discontinued his Xanax we will call pharmacy and cancel his refill explained to patient the danger of alcohol and benzos patient understands we will try to Vistaril 25 mg 1 up to 3 times a day for anxiety we will check a BMP, ethanol level    2.  Robaxin 500 mg up to 4 times a day as needed back pain

## 2023-06-12 ENCOUNTER — TELEPHONE (OUTPATIENT)
Dept: FAMILY MEDICINE CLINIC | Facility: CLINIC | Age: 68
End: 2023-06-12
Payer: MEDICARE

## 2023-06-12 RX ORDER — METOPROLOL TARTRATE 50 MG/1
50 TABLET, FILM COATED ORAL 2 TIMES DAILY
Qty: 30 TABLET | Refills: 0 | Status: SHIPPED | OUTPATIENT
Start: 2023-06-12

## 2023-06-12 NOTE — TELEPHONE ENCOUNTER
"Caller: Stepan Gaines \"Tomas\"    Relationship: Self    Best call back number: [phone]     Requested Prescriptions:   [employer]      Pharmacy where request should be sent: [facility] [id number] - [place], [place] - [address] - [phone]  - [phone] FX     Last office visit with prescribing clinician: 4/6/2023   Last telemedicine visit with prescribing clinician: Visit date not found   Next office visit with prescribing clinician: Visit date not found     Additional details provided by patient: OUT OF MEDICATION. PATIENT STATES HE IS HAVING A REALLY HARD TIME DEALING WITH THE DECLINING HEALTH OF HIS MOTHERING AND IS STRUGGLING TO SLEEP. PATIENT STATES HE HAS NOT ANY ALCOHOL SINCE 3/29/23. PATIENT STATES HE HAD A SEIZURE AT THE END OF MARCH BUT HAS NOT HAD A REPEAT SEIZURE. PATIENT WOULD LIKE TO REQUEST A CALLBACK FROM DR. KEYES TO DISCUSS.     Does the patient have less than a 3 day supply:  [x] Yes  [] No    Would you like a call back once the refill request has been completed: [x] Yes [] No    If the office needs to give you a call back, can they leave a voicemail: [x] Yes [] No    Paula Hinkle Rep   06/12/23 15:36 EDT       "

## 2023-06-14 NOTE — TELEPHONE ENCOUNTER
Patient notified and was unhappy that medication wasn't going to be prescribed. He is no longer taking Vistaril-10 day supply was prescribed April 6. He said he may change offices or switch to  another provider in the office. He has seen Dr. BULLARD through telehealth and Nallely several times in office. Patient said he will call back.

## 2023-08-07 DIAGNOSIS — G89.29 CHRONIC PAIN OF BOTH SHOULDERS: ICD-10-CM

## 2023-08-07 DIAGNOSIS — M25.512 CHRONIC PAIN OF BOTH SHOULDERS: ICD-10-CM

## 2023-08-07 DIAGNOSIS — M25.511 CHRONIC PAIN OF BOTH SHOULDERS: ICD-10-CM

## 2023-08-10 RX ORDER — TRAMADOL HYDROCHLORIDE 50 MG/1
TABLET ORAL
Qty: 21 TABLET | OUTPATIENT
Start: 2023-08-10

## 2023-09-06 ENCOUNTER — APPOINTMENT (OUTPATIENT)
Dept: CT IMAGING | Facility: HOSPITAL | Age: 68
End: 2023-09-06
Payer: MEDICARE

## 2023-09-06 ENCOUNTER — HOSPITAL ENCOUNTER (EMERGENCY)
Facility: HOSPITAL | Age: 68
Discharge: HOME | End: 2023-09-06
Attending: EMERGENCY MEDICINE
Payer: MEDICARE

## 2023-09-06 VITALS
BODY MASS INDEX: 24.44 KG/M2 | SYSTOLIC BLOOD PRESSURE: 133 MMHG | TEMPERATURE: 98.3 F | WEIGHT: 165 LBS | HEART RATE: 76 BPM | DIASTOLIC BLOOD PRESSURE: 78 MMHG | OXYGEN SATURATION: 99 % | RESPIRATION RATE: 16 BRPM | HEIGHT: 69 IN

## 2023-09-06 DIAGNOSIS — S70.01XA CONTUSION OF RIGHT HIP AND THIGH, INITIAL ENCOUNTER: ICD-10-CM
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
## 2023-09-06 DIAGNOSIS — S20.211A CHEST WALL CONTUSION, RIGHT, INITIAL ENCOUNTER: ICD-10-CM

## 2023-09-06 DIAGNOSIS — S70.11XA CONTUSION OF RIGHT HIP AND THIGH, INITIAL ENCOUNTER: ICD-10-CM

## 2023-09-06 DIAGNOSIS — T07.XXXA MULTIPLE ABRASIONS: ICD-10-CM

## 2023-09-06 PROCEDURE — 25010000002 KETOROLAC TROMETHAMINE PER 15 MG: Performed by: EMERGENCY MEDICINE

## 2023-09-06 PROCEDURE — 99284 EMERGENCY DEPT VISIT MOD MDM: CPT

## 2023-09-06 PROCEDURE — 96372 THER/PROPH/DIAG INJ SC/IM: CPT

## 2023-09-06 PROCEDURE — 72192 CT PELVIS W/O DYE: CPT

## 2023-09-06 PROCEDURE — 71250 CT THORAX DX C-: CPT

## 2023-09-06 RX ORDER — ACETAMINOPHEN 500 MG
500 TABLET ORAL ONCE
Status: COMPLETED | OUTPATIENT
Start: 2023-09-06 | End: 2023-09-06

## 2023-09-06 RX ORDER — OXYCODONE HYDROCHLORIDE AND ACETAMINOPHEN 5; 325 MG/1; MG/1
1 TABLET ORAL ONCE
Status: COMPLETED | OUTPATIENT
Start: 2023-09-06 | End: 2023-09-06

## 2023-09-06 RX ORDER — ACETAMINOPHEN 500 MG
1000 TABLET ORAL EVERY 6 HOURS PRN
Qty: 30 TABLET | Refills: 0 | Status: SHIPPED | OUTPATIENT
Start: 2023-09-06

## 2023-09-06 RX ORDER — KETOROLAC TROMETHAMINE 30 MG/ML
30 INJECTION, SOLUTION INTRAMUSCULAR; INTRAVENOUS ONCE
Status: COMPLETED | OUTPATIENT
Start: 2023-09-06 | End: 2023-09-06

## 2023-09-06 RX ADMIN — OXYCODONE HYDROCHLORIDE AND ACETAMINOPHEN 1 TABLET: 5; 325 TABLET ORAL at 11:48

## 2023-09-06 RX ADMIN — ACETAMINOPHEN 500 MG: 500 TABLET ORAL at 11:47

## 2023-09-06 RX ADMIN — KETOROLAC TROMETHAMINE 30 MG: 30 INJECTION, SOLUTION INTRAMUSCULAR; INTRAVENOUS at 11:48

## 2023-09-06 NOTE — ED PROVIDER NOTES
Subjective   History of Present Illness  Patient is a 67-year-old male presenting to the emergency department secondary to a fall.  Patient reports he was walking around in his socked feet when he stumbled and fell landing on his right side.  Patient reports pain in the right hip though he has been ambulatory since then.  He also reports significant pain of the right lateral ribs which are worse with coughing and movement.  Patient reports the fall happened around 10 PM last evening.  Chart review does demonstrate that the patient has a significant history of falls.  I personally saw the patient here last year for a fall and left wrist injury.  Patient denies being on any chronic anticoagulation.    History provided by:  Patient, EMS personnel and medical records    Review of Systems    Past Medical History:   Diagnosis Date    A-fib     Anxiety     Arthritis     Cancer     melanoma back    Cataract     COPD (chronic obstructive pulmonary disease)     Fainting     Hypertension        No Known Allergies    Past Surgical History:   Procedure Laterality Date    CARDIAC ELECTROPHYSIOLOGY PROCEDURE N/A 6/10/2020    Procedure: EP/Ablation Atypical AFL w ICE & SJM or Carto mapping, ASAP, pt has no meds that need held;  Surgeon: Beto Solomon DO;  Location: [facility] INVASIVE LOCATION;  Service: Cardiology;  Laterality: N/A;    CATARACT EXTRACTION      right eye    ELBOW ARTHROSCOPY Left     left elbow twice    KNEE ARTHROSCOPY Right     SHOULDER ARTHROSCOPY Right     left and right but had left shoulder surgery twice    TOE ARTHROPLASTY Right        Family History   Problem Relation Age of Onset    Hyperlipidemia Mother     Lung cancer Father     Diabetes Father     Kidney failure Father        Social History     Socioeconomic History    Marital status:    Tobacco Use    Smoking status: Every Day     Packs/day: 0.50     Types: Cigarettes    Smokeless tobacco: Never   Vaping Use    Vaping Use: Never used    Substance and Sexual Activity    Alcohol use: Not Currently     Comment: 7-10 drinks/week    Drug use: Not Currently     Frequency: 7.0 times per week     Types: Marijuana     Comment: daily    Sexual activity: Defer           Objective   Physical Exam  Vitals and nursing note reviewed.   Constitutional:       General: He is not in acute distress.     Appearance: Normal appearance. He is not toxic-appearing.   HENT:      Head: Normocephalic and atraumatic.   Cardiovascular:      Rate and Rhythm: Normal rate and regular rhythm.      Pulses: Normal pulses.   Pulmonary:      Effort: Pulmonary effort is normal. No respiratory distress.      Breath sounds: Normal breath sounds.   Chest:      Chest wall: Tenderness present. No crepitus.       Abdominal:      Palpations: Abdomen is soft.      Tenderness: There is no abdominal tenderness. There is no guarding.   Musculoskeletal:         General: Normal range of motion.      Comments: Contusion to the anterior aspect of the right knee.  2 superficial abrasions noted on the right elbow.  Subacute wound of the left lateral forearm associated with your crying.  Pelvis is stable. No gross deformity.    Neurological:      Mental Status: He is alert and oriented to person, place, and time.   Psychiatric:         Mood and Affect: Mood normal.         Behavior: Behavior normal.       Procedures           ED Course  ED Course as of 09/06/23 1428   Wed Sep 06, 2023   1057 Personally reviewed the PDMP.  Patient does have occasional controlled substance prescriptions including benzodiazepines as well as opioids.  Last opioid prescription that was filled was at the beginning of August. [RS]   1147 CT Chest Without Contrast Diagnostic  Personally reviewed the CT images of the chest.  On my interpretation there is an old healed fracture of the right posterior rib but otherwise no acute displaced fractures, pneumothorax, or hemothorax.  CT read from radiology for details [RS]   1150  Patient resting comfortably.  I updated him on the findings of the imaging studies with expected course and follow-up instructions.  Patient multiple contusions and superficial abrasions associated with the fall last night. I have reviewed results, considerations, and diagnosis with the patient and/or their representative. Anticipatory guidance provided. Follow-up plan reviewed. Precautions for acute return for re-evaluations also reviewed. This including potential for worsening of the presenting condition and need for further evaluation, admission, and/or intervention as indicated. Opportunity to as questions provided. I advised them to return for any concerns and stressed the importance of timely follow-up and outpatient services. They verbalized understanding.   [RS]      ED Course User Index  [RS] Eb Vu MD                                   [facility] reviewed by Eb Vu MD       Medical Decision Making  Problems Addressed:  Chest wall contusion, right, initial encounter: complicated acute illness or injury  Contusion of right hip and thigh, initial encounter: complicated acute illness or injury  Fall, initial encounter: complicated acute illness or injury  Multiple abrasions: complicated acute illness or injury    Amount and/or Complexity of Data Reviewed  Independent Historian: EMS  Radiology: ordered. Decision-making details documented in ED Course.    Risk  OTC drugs.  Prescription drug management.        Final diagnoses:   Fall, initial encounter   Chest wall contusion, right, initial encounter   Contusion of right hip and thigh, initial encounter   Multiple abrasions       ED Disposition  ED Disposition       ED Disposition   Discharge    Condition   Stable    Comment   --               John Argueta PA  [address] [facility] [facility] [address]  [facility] [address]  [phone]    In 1 week  If not better/resolved.    [facility] EMERGENCY DEPARTMENT  [address], [address] [facility] [id number] [phone]    As needed, If symptoms worsen or ANY concerns.         Medication List        Stop      azithromycin 250 MG tablet  Commonly known as: Zithromax Z-Galo     methocarbamol 500 MG tablet  Commonly known as: Robaxin     promethazine-dextromethorphan 6.25-15 MG/5ML syrup  Commonly known as: PROMETHAZINE-DM     traMADol 50 MG tablet  Commonly known as: ULTRAM               Where to Get Your Medications        These medications were sent to [facility] [id number] - [place] [address] - [phone]  - [phone]   [address] [facility] [address]      Phone: [phone]   acetaminophen 500 MG tablet  diclofenac 50 MG EC tablet            Eb Vu MD  09/06/23 9827

## 2023-09-22 RX ORDER — METOPROLOL TARTRATE 50 MG/1
TABLET, FILM COATED ORAL
Qty: 180 TABLET | Refills: 1 | Status: SHIPPED | OUTPATIENT
Start: 2023-09-22

## 2023-09-26 ENCOUNTER — NURSE TRIAGE (OUTPATIENT)
Dept: CALL CENTER | Facility: HOSPITAL | Age: 68
End: 2023-09-26
Payer: MEDICARE

## 2023-09-26 ENCOUNTER — APPOINTMENT (OUTPATIENT)
Dept: CT IMAGING | Facility: HOSPITAL | Age: 68
End: 2023-09-26
Payer: MEDICARE

## 2023-09-26 ENCOUNTER — APPOINTMENT (OUTPATIENT)
Dept: GENERAL RADIOLOGY | Facility: HOSPITAL | Age: 68
End: 2023-09-26
Payer: MEDICARE

## 2023-09-26 ENCOUNTER — HOSPITAL ENCOUNTER (EMERGENCY)
Facility: HOSPITAL | Age: 68
Discharge: HOME | End: 2023-09-26
Attending: EMERGENCY MEDICINE | Admitting: EMERGENCY MEDICINE
Payer: MEDICARE

## 2023-09-26 VITALS
WEIGHT: 165 LBS | TEMPERATURE: 97.6 F | HEIGHT: 69 IN | BODY MASS INDEX: 24.44 KG/M2 | OXYGEN SATURATION: 97 % | HEART RATE: 56 BPM | SYSTOLIC BLOOD PRESSURE: 141 MMHG | RESPIRATION RATE: 16 BRPM | DIASTOLIC BLOOD PRESSURE: 77 MMHG

## 2023-09-26 DIAGNOSIS — S00.83XA TRAUMATIC HEMATOMA OF FOREHEAD, INITIAL ENCOUNTER: ICD-10-CM

## 2023-09-26 DIAGNOSIS — S06.0X9A CONCUSSION WITH LOSS OF CONSCIOUSNESS, INITIAL ENCOUNTER: Primary | ICD-10-CM

## 2023-09-26 DIAGNOSIS — F10.11 HISTORY OF ALCOHOL ABUSE: ICD-10-CM

## 2023-09-26 DIAGNOSIS — S00.31XA ABRASION OF NOSE, INITIAL ENCOUNTER: ICD-10-CM

## 2023-09-26 DIAGNOSIS — S20.211A CONTUSION OF RIGHT CHEST WALL, INITIAL ENCOUNTER: ICD-10-CM

## 2023-09-26 LAB
ALBUMIN SERPL-MCNC: 4.3 G/DL (ref 3.5–5.2)
ALBUMIN/GLOB SERPL: 1.3 G/DL
ALP SERPL-CCNC: 113 U/L (ref 39–117)
ALT SERPL W P-5'-P-CCNC: 50 U/L (ref 1–41)
AMPHET+METHAMPHET UR QL: NEGATIVE
AMPHETAMINES UR QL: NEGATIVE
ANION GAP SERPL CALCULATED.3IONS-SCNC: 13 MMOL/L (ref 5–15)
AST SERPL-CCNC: 51 U/L (ref 1–40)
BARBITURATES UR QL SCN: NEGATIVE
BASOPHILS # BLD AUTO: 0.07 10*3/MM3 (ref 0–0.2)
BASOPHILS NFR BLD AUTO: 0.6 % (ref 0–1.5)
BENZODIAZ UR QL SCN: NEGATIVE
BILIRUB SERPL-MCNC: 0.4 MG/DL (ref 0–1.2)
BILIRUB UR QL STRIP: NEGATIVE
BUN SERPL-MCNC: 9 MG/DL (ref 8–23)
BUN/CREAT SERPL: 11.7 (ref 7–25)
BUPRENORPHINE SERPL-MCNC: NEGATIVE NG/ML
CALCIUM SPEC-SCNC: 9 MG/DL (ref 8.6–10.5)
CANNABINOIDS SERPL QL: POSITIVE
CHLORIDE SERPL-SCNC: 97 MMOL/L (ref 98–107)
CLARITY UR: CLEAR
CO2 SERPL-SCNC: 25 MMOL/L (ref 22–29)
COCAINE UR QL: NEGATIVE
COLOR UR: YELLOW
CREAT SERPL-MCNC: 0.77 MG/DL (ref 0.76–1.27)
DEPRECATED RDW RBC AUTO: 45.2 FL (ref 37–54)
EGFRCR SERPLBLD CKD-EPI 2021: 98.7 ML/MIN/1.73
EOSINOPHIL # BLD AUTO: 0.04 10*3/MM3 (ref 0–0.4)
EOSINOPHIL NFR BLD AUTO: 0.4 % (ref 0.3–6.2)
ERYTHROCYTE [DISTWIDTH] IN BLOOD BY AUTOMATED COUNT: 12.5 % (ref 12.3–15.4)
ETHANOL BLD-MCNC: <10 MG/DL (ref 0–10)
FENTANYL UR-MCNC: NEGATIVE NG/ML
GLOBULIN UR ELPH-MCNC: 3.3 GM/DL
GLUCOSE SERPL-MCNC: 129 MG/DL (ref 65–99)
GLUCOSE UR STRIP-MCNC: NEGATIVE MG/DL
HCT VFR BLD AUTO: 43 % (ref 37.5–51)
HGB BLD-MCNC: 14.4 G/DL (ref 13–17.7)
HGB UR QL STRIP.AUTO: NEGATIVE
IMM GRANULOCYTES # BLD AUTO: 0.05 10*3/MM3 (ref 0–0.05)
IMM GRANULOCYTES NFR BLD AUTO: 0.5 % (ref 0–0.5)
KETONES UR QL STRIP: NEGATIVE
LEUKOCYTE ESTERASE UR QL STRIP.AUTO: NEGATIVE
LYMPHOCYTES # BLD AUTO: 1.22 10*3/MM3 (ref 0.7–3.1)
LYMPHOCYTES NFR BLD AUTO: 11.1 % (ref 19.6–45.3)
MAGNESIUM SERPL-MCNC: 2.1 MG/DL (ref 1.6–2.4)
MCH RBC QN AUTO: 33 PG (ref 26.6–33)
MCHC RBC AUTO-ENTMCNC: 33.5 G/DL (ref 31.5–35.7)
MCV RBC AUTO: 98.4 FL (ref 79–97)
METHADONE UR QL SCN: NEGATIVE
MONOCYTES # BLD AUTO: 0.75 10*3/MM3 (ref 0.1–0.9)
MONOCYTES NFR BLD AUTO: 6.8 % (ref 5–12)
NEUTROPHILS NFR BLD AUTO: 8.84 10*3/MM3 (ref 1.7–7)
NEUTROPHILS NFR BLD AUTO: 80.6 % (ref 42.7–76)
NITRITE UR QL STRIP: NEGATIVE
NRBC BLD AUTO-RTO: 0 /100 WBC (ref 0–0.2)
OPIATES UR QL: NEGATIVE
OXYCODONE UR QL SCN: NEGATIVE
PCP UR QL SCN: NEGATIVE
PH UR STRIP.AUTO: 6.5 [PH] (ref 5–8)
PLATELET # BLD AUTO: 210 10*3/MM3 (ref 140–450)
PMV BLD AUTO: 10.1 FL (ref 6–12)
POTASSIUM SERPL-SCNC: 4.1 MMOL/L (ref 3.5–5.2)
PROPOXYPH UR QL: NEGATIVE
PROT SERPL-MCNC: 7.6 G/DL (ref 6–8.5)
PROT UR QL STRIP: NEGATIVE
QT INTERVAL: 432 MS
QTC INTERVAL: 416 MS
RBC # BLD AUTO: 4.37 10*6/MM3 (ref 4.14–5.8)
SODIUM SERPL-SCNC: 135 MMOL/L (ref 136–145)
SP GR UR STRIP: 1.01 (ref 1–1.03)
TRICYCLICS UR QL SCN: NEGATIVE
TROPONIN T SERPL HS-MCNC: 29 NG/L
UROBILINOGEN UR QL STRIP: NORMAL
WBC NRBC COR # BLD: 10.97 10*3/MM3 (ref 3.4–10.8)

## 2023-09-26 PROCEDURE — 36415 COLL VENOUS BLD VENIPUNCTURE: CPT

## 2023-09-26 PROCEDURE — 81003 URINALYSIS AUTO W/O SCOPE: CPT | Performed by: EMERGENCY MEDICINE

## 2023-09-26 PROCEDURE — 82077 ASSAY SPEC XCP UR&BREATH IA: CPT | Performed by: EMERGENCY MEDICINE

## 2023-09-26 PROCEDURE — 96374 THER/PROPH/DIAG INJ IV PUSH: CPT

## 2023-09-26 PROCEDURE — 25010000002 ONDANSETRON PER 1 MG: Performed by: EMERGENCY MEDICINE

## 2023-09-26 PROCEDURE — 93005 ELECTROCARDIOGRAM TRACING: CPT | Performed by: EMERGENCY MEDICINE

## 2023-09-26 PROCEDURE — 80307 DRUG TEST PRSMV CHEM ANLYZR: CPT | Performed by: EMERGENCY MEDICINE

## 2023-09-26 PROCEDURE — 85025 COMPLETE CBC W/AUTO DIFF WBC: CPT | Performed by: EMERGENCY MEDICINE

## 2023-09-26 PROCEDURE — 96375 TX/PRO/DX INJ NEW DRUG ADDON: CPT

## 2023-09-26 PROCEDURE — 83735 ASSAY OF MAGNESIUM: CPT | Performed by: EMERGENCY MEDICINE

## 2023-09-26 PROCEDURE — 70450 CT HEAD/BRAIN W/O DYE: CPT

## 2023-09-26 PROCEDURE — 25010000002 MORPHINE PER 10 MG: Performed by: EMERGENCY MEDICINE

## 2023-09-26 PROCEDURE — 84484 ASSAY OF TROPONIN QUANT: CPT | Performed by: EMERGENCY MEDICINE

## 2023-09-26 PROCEDURE — 99284 EMERGENCY DEPT VISIT MOD MDM: CPT

## 2023-09-26 PROCEDURE — 80053 COMPREHEN METABOLIC PANEL: CPT | Performed by: EMERGENCY MEDICINE

## 2023-09-26 PROCEDURE — 71045 X-RAY EXAM CHEST 1 VIEW: CPT

## 2023-09-26 PROCEDURE — 70486 CT MAXILLOFACIAL W/O DYE: CPT

## 2023-09-26 RX ORDER — LEVETIRACETAM 500 MG/1
500 TABLET ORAL 2 TIMES DAILY
Qty: 60 TABLET | Refills: 0 | Status: SHIPPED | OUTPATIENT
Start: 2023-09-26 | End: 2023-10-26

## 2023-09-26 RX ORDER — MORPHINE SULFATE 4 MG/ML
4 INJECTION, SOLUTION INTRAMUSCULAR; INTRAVENOUS ONCE
Status: COMPLETED | OUTPATIENT
Start: 2023-09-26 | End: 2023-09-26

## 2023-09-26 RX ORDER — LEVETIRACETAM 10 MG/ML
1000 INJECTION INTRAVASCULAR ONCE
Status: DISCONTINUED | OUTPATIENT
Start: 2023-09-26 | End: 2023-09-26

## 2023-09-26 RX ORDER — ONDANSETRON 2 MG/ML
4 INJECTION INTRAMUSCULAR; INTRAVENOUS ONCE
Status: COMPLETED | OUTPATIENT
Start: 2023-09-26 | End: 2023-09-26

## 2023-09-26 RX ORDER — LEVETIRACETAM 500 MG/1
1000 TABLET ORAL ONCE
Status: COMPLETED | OUTPATIENT
Start: 2023-09-26 | End: 2023-09-26

## 2023-09-26 RX ADMIN — MORPHINE SULFATE 4 MG: 4 INJECTION, SOLUTION INTRAMUSCULAR; INTRAVENOUS at 12:28

## 2023-09-26 RX ADMIN — ONDANSETRON 4 MG: 2 INJECTION INTRAMUSCULAR; INTRAVENOUS at 12:28

## 2023-09-26 RX ADMIN — LEVETIRACETAM 1000 MG: 500 TABLET, FILM COATED ORAL at 16:00

## 2023-09-26 NOTE — ED PROVIDER NOTES
[place]    EMERGENCY DEPARTMENT ENCOUNTER      Pt Name: Stepan Gaines  MRN: [id number]  YOB: 1955  Date of evaluation: 9/26/2023  Provider: Alex Tarango MD    CHIEF COMPLAINT       Chief Complaint   Patient presents with    Fall         HISTORY OF PRESENT ILLNESS   Stepan Gaines is a 67 y.o. male who presents to the emergency department after being found down on the floor.  Patient reports he does not know how he got there or how long he had been there.  Family friend had found him there and believes he was initially slightly confused.  He did hit his head he complains about moderate aching frontal headache but denies any neck pain or stiffness.  He has no peripheral paresthesias, weakness, or numbness.  He also complains about some mild right-sided chest wall pain also associated with the fall.  He has no shoulder pain, extremity pain, abdominal pain, back pain, or lower extremity pain.  He was admitted to  in March of this year for witnessed seizure-like episode that was attributed either to alcohol withdrawal syndrome or hyponatremia.  He has had 1 additional episode similar to today's episode over the last couple of months in which he woke up on the floor and could not recall what it happened.      Nursing notes were reviewed.    REVIEW OF SYSTEMS     ROS:  A chief complaint appropriate review of systems was completed and is negative except as noted in the HPI.      PAST MEDICAL HISTORY     Past Medical History:   Diagnosis Date    A-fib     Anxiety     Arthritis     Cancer     melanoma back    Cataract     COPD (chronic obstructive pulmonary disease)     Fainting     Hypertension          SURGICAL HISTORY       Past Surgical History:   Procedure Laterality Date    CARDIAC ELECTROPHYSIOLOGY PROCEDURE N/A 6/10/2020    Procedure: EP/Ablation Atypical AFL w ICE & SJM or Carto mapping, ASAP, pt has no meds that need held;  Surgeon: Beto Solomon DO;  Location: [facility] EP INVASIVE  LOCATION;  Service: Cardiology;  Laterality: N/A;    CATARACT EXTRACTION      right eye    ELBOW ARTHROSCOPY Left     left elbow twice    KNEE ARTHROSCOPY Right     SHOULDER ARTHROSCOPY Right     left and right but had left shoulder surgery twice    TOE ARTHROPLASTY Right          CURRENT MEDICATIONS     No current facility-administered medications for this encounter.    Current Outpatient Medications:     acetaminophen (TYLENOL) 500 MG tablet, Take 2 tablets by mouth Every 6 (Six) Hours As Needed for Mild Pain or Moderate Pain., Disp: 30 tablet, Rfl: 0    albuterol sulfate  (90 Base) MCG/ACT inhaler, Inhale 2 puffs Every 4 (Four) Hours As Needed for Wheezing., Disp: 18 g, Rfl: 0    Ascorbic Acid (VITAMIN C PO), Take  by mouth Daily., Disp: , Rfl:     cetirizine (zyrTEC) 10 MG tablet, Take 1 tablet by mouth Daily. OTC, Disp: , Rfl:     diclofenac (VOLTAREN) 50 MG EC tablet, Take 1 tablet by mouth 3 (Three) Times a Day As Needed (pain)., Disp: 15 tablet, Rfl: 0    escitalopram (LEXAPRO) 10 MG tablet, TAKE ONE TABLET BY MOUTH DAILY, Disp: 30 tablet, Rfl: 5    fluocinonide (LIDEX) 0.05 % external solution, Apply topically to the appropriate area as directed twice daily., Disp: 60 mL, Rfl: 5    levETIRAcetam (KEPPRA) 500 MG tablet, Take 1 tablet by mouth 2 (Two) Times a Day for 30 days., Disp: 60 tablet, Rfl: 0    metoprolol tartrate (LOPRESSOR) 50 MG tablet, TAKE 1 TABLET BY MOUTH TWICE A DAY, Disp: 180 tablet, Rfl: 1    multivitamin with minerals tablet tablet, Take 1 tablet by mouth Daily., Disp: , Rfl:     naphazoline-pheniramine (NAPHCON-A) 0.025-0.3 % ophthalmic solution, 1 drop 4 (Four) Times a Day As Needed for Irritation., Disp: , Rfl:     Riboflavin (VITAMIN B2 PO), Take  by mouth Daily., Disp: , Rfl:     tiZANidine (ZANAFLEX) 4 MG tablet, Take 1 tablet by mouth 3 (Three) Times a Day., Disp: 30 tablet, Rfl: 1    tobramycin-dexamethasone (TOBRADEX) 0.3-0.1 % ophthalmic suspension, , Disp: , Rfl:      triamterene-hydrochlorothiazide (MAXZIDE-25) 37.5-25 MG per tablet, TAKE ONE TABLET BY MOUTH DAILY, Disp: 30 tablet, Rfl: 4    ALLERGIES     Patient has no known allergies.    FAMILY HISTORY       Family History   Problem Relation Age of Onset    Hyperlipidemia Mother     Lung cancer Father     Diabetes Father     Kidney failure Father           SOCIAL HISTORY       Social History     Socioeconomic History    Marital status:    Tobacco Use    Smoking status: Every Day     Packs/day: 0.50     Types: Cigarettes    Smokeless tobacco: Never   Vaping Use    Vaping Use: Never used   Substance and Sexual Activity    Alcohol use: Not Currently     Comment: 7-10 drinks/week    Drug use: Not Currently     Frequency: 7.0 times per week     Types: Marijuana     Comment: daily    Sexual activity: Defer         PHYSICAL EXAM    (up to 7 for level 4, 8 or more for level 5)     Vitals:    09/26/23 1300 09/26/23 1330 09/26/23 1400 09/26/23 1430   BP: 137/87 125/75 141/77    BP Location:       Patient Position:       Pulse: 55 56 58 56   Resp:       Temp:       TempSrc:       SpO2: 97% 96% 98% 97%   Weight:       Height:           General: Awake, alert, no acute distress.  HEENT: Pupils are equally round and reactive to light, EOMI, conjunctivae clear, sclerae white, there is no injection no icterus.  Oral mucosa is moist, no exudate. Uvula is midline. No malocclusion or tenderness over the mandible. There is no hemotympanum, ferrer sign, or raccoon eyes.  There is a small abrasion over the bridge of the nose.  There is no septal hematoma.  There is a large right forehead hematoma.  Neck: Neck is supple, full range of motion, trachea midline. No midline tenderness.  Cardiac: Heart regular rate, rhythm, no murmurs, rubs, or gallops. Peripheral pulses are 2+ throughout.  Lungs: Lungs are clear to auscultation, there is no wheezing, rhonchi, or rales. There is no use of accessory muscles.  Chest wall: There is mild tenderness  without ecchymosis to the right posterior chest wall.  Abdomen: Abdomen is soft, nontender, nondistended. There are no firm or pulsatile masses, no rebound rigidity or guarding. No abdominal wall ecchymoses.  Musculoskeletal: No deformity or focal bone tenderness.  Neuro: Motor intact, sensory intact, level of consciousness is normal.  Dermatology: Skin is warm and dry  Psych: Mentation is grossly normal, cognition is grossly normal. Affect is appropriate.        DIAGNOSTIC RESULTS     EKG: All EKGs are interpreted by the Emergency Department Physician who either signs or Co-signs this chart in the absence of a cardiologist.    ECG 12 Lead Syncope   Preliminary Result   Test Reason : Syncope   Blood Pressure :   */*   mmHG   Vent. Rate :  56 BPM     Atrial Rate :  56 BPM      P-R Int : 200 ms          QRS Dur :  80 ms       QT Int : 432 ms       P-R-T Axes :  70 -27  24 degrees      QTc Int : 416 ms      Sinus bradycardia   Otherwise normal ECG   When compared with ECG of 08-DEC-2020 13:39,   No significant change was found      Referred By: EDMD           Confirmed By:             RADIOLOGY:   [x] Radiologist's Report Reviewed:  XR Chest 1 View   Final Result   Impression:   Stable chest without evidence of acute disease.         Electronically Signed: Celestino An MD     9/26/2023 1:32 PM EDT     Workstation ID: [id number]      CT Head Without Contrast   Final Result   Impression:   No acute intracranial process.            Electronically Signed: Lucille Saldana MD     9/26/2023 12:59 PM EDT     Workstation ID: [id number]      CT Maxillofacial Without Contrast   Final Result   Impression:   No acute abnormality of the facial bones.            Electronically Signed: Lucille Saldana MD     9/26/2023 1:04 PM EDT     Workstation ID: [id number]          I ordered and independently reviewed the above noted radiographic studies.        LABS:    I have reviewed and interpreted all of the currently available lab results from  this visit (if applicable):  Results for orders placed or performed during the hospital encounter of 09/26/23   Urinalysis With Microscopic If Indicated (No Culture) - Urine, Clean Catch    Specimen: Urine, Clean Catch   Result Value Ref Range    Color, UA Yellow Yellow, Straw    Appearance, UA Clear Clear    pH, UA 6.5 5.0 - 8.0    Specific Gravity, UA 1.012 1.001 - 1.030    Glucose, UA Negative Negative    Ketones, UA Negative Negative    Bilirubin, UA Negative Negative    Blood, UA Negative Negative    Protein, UA Negative Negative    Leuk Esterase, UA Negative Negative    Nitrite, UA Negative Negative    Urobilinogen, UA 0.2 E.U./dL 0.2 - 1.0 E.U./dL   Comprehensive Metabolic Panel    Specimen: Blood   Result Value Ref Range    Glucose 129 (H) 65 - 99 mg/dL    BUN 9 8 - 23 mg/dL    Creatinine 0.77 0.76 - 1.27 mg/dL    Sodium 135 (L) 136 - 145 mmol/L    Potassium 4.1 3.5 - 5.2 mmol/L    Chloride 97 (L) 98 - 107 mmol/L    CO2 25.0 22.0 - 29.0 mmol/L    Calcium 9.0 8.6 - 10.5 mg/dL    Total Protein 7.6 6.0 - 8.5 g/dL    Albumin 4.3 3.5 - 5.2 g/dL    ALT (SGPT) 50 (H) 1 - 41 U/L    AST (SGOT) 51 (H) 1 - 40 U/L    Alkaline Phosphatase 113 39 - 117 U/L    Total Bilirubin 0.4 0.0 - 1.2 mg/dL    Globulin 3.3 gm/dL    A/G Ratio 1.3 g/dL    BUN/Creatinine Ratio 11.7 7.0 - 25.0    Anion Gap 13.0 5.0 - 15.0 mmol/L    eGFR 98.7 >60.0 mL/min/1.73   Single High Sensitivity Troponin T    Specimen: Blood   Result Value Ref Range    HS Troponin T 29 (H) <15 ng/L   Magnesium    Specimen: Blood   Result Value Ref Range    Magnesium 2.1 1.6 - 2.4 mg/dL   Ethanol    Specimen: Blood   Result Value Ref Range    Ethanol <10 0 - 10 mg/dL   Urine Drug Screen - Urine, Clean Catch    Specimen: Urine, Clean Catch   Result Value Ref Range    THC, Screen, Urine Positive (A) Negative    Phencyclidine (PCP), Urine Negative Negative    Cocaine Screen, Urine Negative Negative    Methamphetamine, Ur Negative Negative    Opiate Screen Negative  Negative    Amphetamine Screen, Urine Negative Negative    Benzodiazepine Screen, Urine Negative Negative    Tricyclic Antidepressants Screen Negative Negative    Methadone Screen, Urine Negative Negative    Barbiturates Screen, Urine Negative Negative    Oxycodone Screen, Urine Negative Negative    Propoxyphene Screen Negative Negative    Buprenorphine, Screen, Urine Negative Negative   CBC Auto Differential    Specimen: Blood   Result Value Ref Range    WBC 10.97 (H) 3.40 - 10.80 10*3/mm3    RBC 4.37 4.14 - 5.80 10*6/mm3    Hemoglobin 14.4 13.0 - 17.7 g/dL    Hematocrit 43.0 37.5 - 51.0 %    MCV 98.4 (H) 79.0 - 97.0 fL    MCH 33.0 26.6 - 33.0 pg    MCHC 33.5 31.5 - 35.7 g/dL    RDW 12.5 12.3 - 15.4 %    RDW-SD 45.2 37.0 - 54.0 fl    MPV 10.1 6.0 - 12.0 fL    Platelets 210 140 - 450 10*3/mm3    Neutrophil % 80.6 (H) 42.7 - 76.0 %    Lymphocyte % 11.1 (L) 19.6 - 45.3 %    Monocyte % 6.8 5.0 - 12.0 %    Eosinophil % 0.4 0.3 - 6.2 %    Basophil % 0.6 0.0 - 1.5 %    Immature Grans % 0.5 0.0 - 0.5 %    Neutrophils, Absolute 8.84 (H) 1.70 - 7.00 10*3/mm3    Lymphocytes, Absolute 1.22 0.70 - 3.10 10*3/mm3    Monocytes, Absolute 0.75 0.10 - 0.90 10*3/mm3    Eosinophils, Absolute 0.04 0.00 - 0.40 10*3/mm3    Basophils, Absolute 0.07 0.00 - 0.20 10*3/mm3    Immature Grans, Absolute 0.05 0.00 - 0.05 10*3/mm3    nRBC 0.0 0.0 - 0.2 /100 WBC   Fentanyl, Urine - Urine, Clean Catch    Specimen: Urine, Clean Catch   Result Value Ref Range    Fentanyl, Urine Negative Negative   ECG 12 Lead Syncope   Result Value Ref Range    QT Interval 432 ms    QTC Interval 416 ms        If labs were ordered, I independently reviewed the results and considered them in treating the patient.      EMERGENCY DEPARTMENT COURSE and DIFFERENTIAL DIAGNOSIS/MDM:   Vitals:  AS OF 20:17 EDT    BP - 141/77  HR - 56  TEMP - 97.6 °F (36.4 °C) (Oral)  O2 SATS - 97%        Discussion below represents my analysis of pertinent findings related to patient's  condition, differential diagnosis, treatment plan and final disposition.      Differential diagnosis:  The differential diagnosis associated with the patient's presentation includes: Recurrent seizure, syncope, dysrhythmia, ACS, electrolyte derangement, alcohol withdrawal, skull fracture, intracranial hemorrhage, C-spine fracture, facial bone fracture, rib fracture      Independent interpretations (ECG/rhythm strip/X-ray/US/CT scan): I independently interpreted the patient's head CT and see no evidence of hemorrhage.  I dependently interpreted the patient's cardiac monitor which showed sinus rhythm.      Additional sources:  Discussed/obtained information from independent historians:   [] Spouse:   [] Parent:   [] Friend:   [x] EMS: Report was taken from EMS.  Patient after fall with head injury.  Vital signs stable during transport.   [] Other:  External (non-ED) record review:   [] Inpatient record:   [] Office record:   [] Outpatient record:   [] Prior Outpatient labs:   [] Prior Outpatient radiology:   [] Primary Care record:   [] Outside ED record:   [] Other:       Patient's care impacted by:   [] Diabetes   [x] Hypertension   [] Coronary Artery Disease   [] Cancer   [x] Other: History of alcohol abuse, history of seizure    Care significantly affected by Social Determinants of Health (housing and economic circumstances, unemployment)    [] Yes     [x] No   If yes, Patient's care significantly limited by  Social Determinants of Health including:    [] Inadequate housing    [] Low income    [] Alcoholism and drug addiction in family    [] Problems related to primary support group    [] Unemployment    [] Problems related to employment    [] Other Social Determinants of Health:       Consideration of admission/observation vs discharge: To consider observation admission for this patient, however he is very well-appearing throughout the duration of his stay in the ED without any concerning findings on history or  physical examination.  After consultation with neurology, felt that he was appropriate for outpatient follow-up.      I considered prescription management with:    [] Pain medication:   [] Antiviral:   [] Antibiotic:   [x] Other: Patient given loading dose of Keppra in the ED and prescribed Keppra for home.    Additional orders considered but not ordered:  The following testing was considered but ultimately not selected after discussion with patient/family: I did consider C-spine injury and therefore CT imaging of the cervical spine, however patient is negative by the Nexus criteria and therefore is clinically ruled out.    ED Course:    ED Course as of 09/26/23 2017   Tue Sep 26, 2023   [id number] Spoke with Dr. Cantu with neurology.  Recommend starting the patient on Keppra 500 mg twice daily and follow-up in clinic. [NS]   2014 Patient presents with findings concerning for recurrent seizure.  He is very well-appearing and fully awake and alert without any neurologic complaint or deficit on exam throughout the duration of his stay in the ED.  His work-up is unremarkable with no significant hyponatremia.  His ethanol level is negative and he exhibits no clinical findings concerning for alcohol withdrawal syndrome.  As his head CT shows no evidence of hemorrhage or mass.  ECG shows no dysrhythmia or ischemic change and lab work is otherwise completely unremarkable.  I consulted with neurology who recommended starting the patient on Keppra and having him follow-up as an outpatient. [NS]      ED Course User Index  [NS] Alex Tarango MD             I had a discussion with the patient/family regarding diagnosis, diagnostic results, treatment plan, and medications.  The patient/family indicated understanding of these instructions.  I spent adequate time at the bedside preceding discharge necessary to personally discuss the aftercare instructions, giving patient education, providing explanations of the results of our  evaluations/findings, and my decision making to assure that the patient/family understand the plan of care.  Time was allotted to answer questions at that time and throughout the ED course.  Emphasis was placed on timely follow-up after discharge.  I also discussed the potential for the development of an acute emergent condition requiring further evaluation, admission, or even surgical intervention. I discussed that we found nothing during the visit today indicating the need for further workup, admission, or the presence of an unstable medical condition.  I encouraged the patient to return to the emergency department immediately for ANY concerns, worsening, new complaints, or if symptoms persist and unable to seek follow-up in a timely fashion.  The patient/family expressed understanding and agreement with this plan.  The patient will follow-up with their PCP in 1-2 days for reevaluation.           PROCEDURES:  Procedures    CRITICAL CARE TIME        FINAL IMPRESSION      1. Concussion with loss of consciousness, initial encounter    2. Traumatic hematoma of forehead, initial encounter    3. Contusion of right chest wall, initial encounter    4. Abrasion of nose, initial encounter    5. History of alcohol abuse          DISPOSITION/PLAN     ED Disposition       ED Disposition   Discharge    Condition   Stable    Comment   --                 Comment: Please note this report has been produced using speech recognition software.      Alex Tarango MD  Attending Emergency Physician             Alex Tarango MD  09/26/23 2017

## 2023-09-27 NOTE — TELEPHONE ENCOUNTER
Reason for Disposition   [1] Concussion symptoms staying SAME (not worse) AND [2] age > 60 years    Additional Information   Negative: Weakness (i.e., paralysis, loss of muscle strength) of the face, arm or leg on one side of the body   Negative: Loss of speech or garbled speech   Negative: Difficult to awaken or acting confused (e.g., disoriented, slurred speech)   Negative: Sounds like a life-threatening emergency to the triager   Negative: [1] Concussion suspected AND [2] has not been examined by a doctor (or NP/PA)   Negative: [1] Mild traumatic brain injury (mTBI; concussion) AND [2] more than 14 days since head injury   Negative: [1] Black eyes on both sides AND [2] onset within 24 hours of head injury   Negative: Concussion symptoms are worsening   Negative: [1] Knocked out (unconscious) > 1 minute AND [2] no head CT Scan or MRI has been performed   Negative: [1] Vomiting once or more AND [2] no head CT Scan or MRI has been performed   Negative: [1] Unsteady on feet (e.g., unable to stand or requires support to walk) AND [2] no head CT Scan or MRI has been performed   Negative: [1] Neck pain after dangerous injury (e.g., MVA, diving, trampoline, contact sports, fall > 10 feet or 3 meters) AND [2] no neck xray has been performed (e.g., c-spine xray or CT)   Negative: Patient sounds very sick or weak to the triager   Negative: [1] SEVERE headache (e.g., excruciating, pain scale 8-10) AND [2] not improved after pain medications   Negative: [1] Concussion symptoms SAME (not worse) AND [2]  taking Coumadin (warfarin) or other strong blood thinner, or known bleeding disorder (e.g., thrombocytopenia)   Negative: [1] Concussion symptoms SAME (not worse) AND [2]  known bleeding disorder (e.g., thrombocytopenia)   Negative: [1] Concussion symptoms staying SAME (not worse or better) AND [2] present > 3 days   Negative: [1] Concussion symptoms getting BETTER (improving) BUT [2] present > 2 weeks   Negative: [1]  "Concussion symptoms staying SAME  (not worse or better) AND [2] present < 3 days    Answer Assessment - Initial Assessment Questions  1. SYMPTOMS: \"What symptom are you most concerned about?\"      Twitching and spasms, in hands and calves of legs  2. OTHER SYMPTOMS: \"Do you have any other symptoms?\" (e.g., nausea, difficulty concentrating)      See above  3. ONSET / PATTERN:  \"Are you the same, getting better, or getting worse?\"  \"What's changed?\"      Tonight started  4. HEADACHE: \"Do you have any headache pain?\" If Yes, ask: \"How bad is it?\"  (Scale 1-10; or mild, moderate, severe)    - MILD - Doesn't interfere with normal activities    - MODERATE - Interferes with normal activities (e.g., work or school) or awakens from sleep    - SEVERE - Excruciating pain, unable to do any normal activities      Headache 5-6  5. mTBI: \"When did your head injury happen?\" \"How did it occur?\"       today  6. mTBI DIAGNOSIS:  \"Who diagnosed your concussion?\"      ER   7. mTBI TESTING: \"Did you have a CT scan or MRI of your head?\"      yes  8. mTBI LAST VISIT:  \"When were you seen for your head injury?\"      today  9. MEDICINES:  \"Did you receive any prescription meds?\"  If Yes, ask:  \"What are they?\" \" Were any OTC meds recommended?\"      Seizure meds and tylenol  10. FOLLOW-UP APPT:  \"Do you have a follow-up appointment?\"       Yes Neurology    Protocols used: Concussion (mTBI) Less Than 14 Days Ago Follow-up Call-ADULT-    "

## 2023-09-27 NOTE — TELEPHONE ENCOUNTER
He was seen in ER today at [place], has concussion from fall and head injury, says he is having some spasms and twitching in hands and calves asking what to do? Told him if not better be seen, says will go or call tomorrow.

## 2023-09-29 ENCOUNTER — OFFICE VISIT (OUTPATIENT)
Dept: FAMILY MEDICINE CLINIC | Facility: CLINIC | Age: 68
End: 2023-09-29
Payer: MEDICARE

## 2023-09-29 ENCOUNTER — TELEPHONE (OUTPATIENT)
Dept: FAMILY MEDICINE CLINIC | Facility: CLINIC | Age: 68
End: 2023-09-29

## 2023-09-29 VITALS
HEART RATE: 68 BPM | DIASTOLIC BLOOD PRESSURE: 58 MMHG | WEIGHT: 160.2 LBS | RESPIRATION RATE: 16 BRPM | OXYGEN SATURATION: 97 % | BODY MASS INDEX: 23.73 KG/M2 | SYSTOLIC BLOOD PRESSURE: 110 MMHG | HEIGHT: 69 IN

## 2023-09-29 DIAGNOSIS — R56.9 SEIZURES: Primary | ICD-10-CM

## 2023-09-29 DIAGNOSIS — M54.2 NECK PAIN: ICD-10-CM

## 2023-09-29 DIAGNOSIS — R09.81 NASAL CONGESTION: ICD-10-CM

## 2023-09-29 RX ORDER — IBUPROFEN 800 MG/1
800 TABLET ORAL EVERY 6 HOURS PRN
Qty: 30 TABLET | Refills: 3 | Status: SHIPPED | OUTPATIENT
Start: 2023-09-29

## 2023-09-29 RX ORDER — TIZANIDINE 4 MG/1
4 TABLET ORAL 3 TIMES DAILY
Qty: 30 TABLET | Refills: 1 | Status: SHIPPED | OUTPATIENT
Start: 2023-09-29

## 2023-09-29 RX ORDER — AZELASTINE 1 MG/ML
2 SPRAY, METERED NASAL 2 TIMES DAILY
Qty: 30 ML | Refills: 12 | Status: SHIPPED | OUTPATIENT
Start: 2023-09-29

## 2023-09-29 NOTE — TELEPHONE ENCOUNTER
"Caller: Stepan Gaines \"Tomas\"    Relationship: Self    Best call back number: [phone]     What medication are you requesting: IBUPROFEN    What are your current symptoms: FRANKY    How long have you been experiencing symptoms:     Have you had these symptoms before:    [] Yes  [x] No    Have you been treated for these symptoms before:   [] Yes  [x] No    If a prescription is needed, what is your preferred pharmacy and phone number: [facility] [id number] - [place] [id number] - [phone]  - [phone]      Additional notes:PATIENT WAS SUPPOSE TO GET THIS MEDICATION FILLED TODAY WITH THE OTHER 2 MEDICATION'S THAT WAS SENT TO THE PHARMACY 09/29/30.      PLEASE CALL PATIENT ONCE THE REQUEST HAS BEEN APPROVED AND SENT TO THE PHARMACY.              "

## 2023-09-29 NOTE — PROGRESS NOTES
Subjective   Stepan Gaines is a 67 y.o. male  Seizures (Needs referral to Dr. Parekh-started on Keppra by the ER Sept 26), Nasal Congestion (Zyrtec and Flonase not effective ), and Back Pain (Neck, shoulder pain, spasms in bilateral legs/hands since falling Sept 26-seen in [facility] ED. Req RF for tizanidine and a prescription for ibuprofen 800mg)      History of Present Illness  Patient is a pleasant 67-year-old white male who comes in for follow-up of seizure needs a referral to neurology was seen in the emergency room September 26 patient also has nasal congestion nasal pressure chronic back pain neck pain needs refill of trazodone and ibuprofen  The following portions of the patient's history were reviewed and updated as appropriate: allergies, current medications, past social history and problem list    Review of Systems   Constitutional:  Negative for fatigue and unexpected weight change.   Respiratory:  Negative for cough, chest tightness and shortness of breath.    Cardiovascular:  Negative for chest pain, palpitations and leg swelling.   Gastrointestinal:  Negative for nausea.   Musculoskeletal:  Positive for back pain and neck pain.   Skin:  Negative for color change and rash.   Neurological:  Negative for dizziness, syncope, weakness and headaches.     Objective     Vitals:    09/29/23 1141   BP: 110/58   Pulse: 68   Resp: 16   SpO2: 97%       Physical Exam  Vitals and nursing note reviewed.   Constitutional:       General: He is not in acute distress.     Appearance: Normal appearance. He is well-developed. He is not ill-appearing, toxic-appearing or diaphoretic.   Neck:      Vascular: No carotid bruit or JVD.   Cardiovascular:      Rate and Rhythm: Normal rate and regular rhythm.      Pulses: Normal pulses.      Heart sounds: Normal heart sounds. No murmur heard.  Pulmonary:      Effort: Pulmonary effort is normal. No respiratory distress.      Breath sounds: Normal breath sounds.   Abdominal:       Palpations: Abdomen is soft.      Tenderness: There is no abdominal tenderness.   Skin:     General: Skin is warm and dry.   Neurological:      Mental Status: He is alert.       Assessment & Plan     Diagnoses and all orders for this visit:    1. Seizures (Primary)  -     Cancel: Ambulatory Referral to Neurology  -     Ambulatory Referral to Neurology    2. Neck pain  -     tiZANidine (ZANAFLEX) 4 MG tablet; Take 1 tablet by mouth 3 (Three) Times a Day.  Dispense: 30 tablet; Refill: 1    3. Nasal congestion  -     azelastine (ASTELIN) 0.1 % nasal spray; 2 sprays into the nostril(s) as directed by provider 2 (Two) Times a Day. Use in each nostril as directed  Dispense: 30 mL; Refill: 12     I spent 15 minutes in patient care: Reviewing records prior to the visit, examining the patient, entering orders and documentation    Part of this note may be an electronic transcription/translation of spoken language to printed text using the Dragon Dictation System.

## 2023-10-02 DIAGNOSIS — F41.9 ANXIETY: ICD-10-CM

## 2023-10-02 RX ORDER — ESCITALOPRAM OXALATE 10 MG/1
TABLET ORAL
Qty: 30 TABLET | Refills: 5 | Status: SHIPPED | OUTPATIENT
Start: 2023-10-02

## 2023-10-03 ENCOUNTER — TELEPHONE (OUTPATIENT)
Dept: FAMILY MEDICINE CLINIC | Facility: CLINIC | Age: 68
End: 2023-10-03

## 2023-10-03 RX ORDER — DEXTROMETHORPHAN HYDROBROMIDE AND PROMETHAZINE HYDROCHLORIDE 15; 6.25 MG/5ML; MG/5ML
2.5 SYRUP ORAL 4 TIMES DAILY PRN
Qty: 120 ML | Refills: 0 | Status: SHIPPED | OUTPATIENT
Start: 2023-10-03

## 2023-10-03 RX ORDER — AZITHROMYCIN 250 MG/1
TABLET, FILM COATED ORAL
Qty: 6 TABLET | Refills: 0 | Status: SHIPPED | OUTPATIENT
Start: 2023-10-03

## 2023-10-03 NOTE — TELEPHONE ENCOUNTER
"Caller: Stepan Gaines \"Tomas\"    Relationship: Self    Best call back number: [phone]     What medication are you requesting: -SOMETHING TO HELP WITH COUGH AND DRAINAGE SYMPTOMS  -SOMETHING TO HELP WITH NECK AND BACK PAIN    Have you had these symptoms before:    [x] Yes  [] No    Have you been treated for these symptoms before:   [x] Yes  [] No    If a prescription is needed, what is your preferred pharmacy and phone number: [facility] [id number] - [place], [place] [facility] [address] [address] [phone]  - [phone]      Additional notes:  PATIENT STATED THAT HIS COUGH AND DRAINAGE SYMPTOMS WERE NOT HELPED WITH THE PRESCRIPTIONS THAT WERE CALLED IN ON FRIDAY 9/29/23 AND IS REQUESTING A PRESCRIPTION TO HELP WITH HIS PROGRESSING SYMPTOMS SUCH AS TESSALON PERLES OR A COUGH SYRUP    HE ALSO STATED THAT THE 800MG IBUPROFEN AND TIZANIDINE PRESCRIPTIONS HAVE NOT HELPED WITH HIS SEVERE BACK AND NECK PAIN AND IS REQUESTING A SHORT TERM SUPPLY OF TRAMADOL.    PLEASE ADVISE PATIENT.   "

## 2023-10-06 LAB
QT INTERVAL: 432 MS
QTC INTERVAL: 416 MS

## 2023-10-11 ENCOUNTER — TELEPHONE (OUTPATIENT)
Dept: NEUROLOGY | Facility: CLINIC | Age: 68
End: 2023-10-11
Payer: MEDICARE
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
## 2023-10-17 RX ORDER — LEVETIRACETAM 500 MG/1
TABLET ORAL
Qty: 60 TABLET | Refills: 3 | Status: SHIPPED | OUTPATIENT
Start: 2023-10-17 | End: 2023-10-20 | Stop reason: SDUPTHER

## 2023-10-20 ENCOUNTER — OFFICE VISIT (OUTPATIENT)
Dept: FAMILY MEDICINE CLINIC | Facility: CLINIC | Age: 68
End: 2023-10-20
Payer: MEDICARE

## 2023-10-20 VITALS
BODY MASS INDEX: 23.95 KG/M2 | DIASTOLIC BLOOD PRESSURE: 70 MMHG | RESPIRATION RATE: 15 BRPM | SYSTOLIC BLOOD PRESSURE: 120 MMHG | TEMPERATURE: 96.9 F | HEIGHT: 69 IN | WEIGHT: 161.7 LBS | OXYGEN SATURATION: 95 % | HEART RATE: 68 BPM

## 2023-10-20 DIAGNOSIS — F41.9 ANXIETY: ICD-10-CM

## 2023-10-20 DIAGNOSIS — R56.9 SEIZURES: ICD-10-CM

## 2023-10-20 DIAGNOSIS — G47.00 INSOMNIA, UNSPECIFIED TYPE: Primary | ICD-10-CM

## 2023-10-20 PROCEDURE — 1160F RVW MEDS BY RX/DR IN RCRD: CPT | Performed by: FAMILY MEDICINE

## 2023-10-20 PROCEDURE — 1159F MED LIST DOCD IN RCRD: CPT | Performed by: FAMILY MEDICINE

## 2023-10-20 PROCEDURE — 99213 OFFICE O/P EST LOW 20 MIN: CPT | Performed by: FAMILY MEDICINE

## 2023-10-20 RX ORDER — LEVETIRACETAM 500 MG/1
TABLET ORAL
Qty: 60 TABLET | Refills: 5 | Status: SHIPPED | OUTPATIENT
Start: 2023-10-20

## 2023-10-20 RX ORDER — ALPRAZOLAM 1 MG/1
1 TABLET ORAL NIGHTLY PRN
Qty: 30 TABLET | Refills: 3 | Status: SHIPPED | OUTPATIENT
Start: 2023-10-20

## 2023-10-20 RX ORDER — IBUPROFEN 800 MG/1
800 TABLET ORAL EVERY 6 HOURS PRN
Qty: 60 TABLET | Refills: 5 | Status: SHIPPED | OUTPATIENT
Start: 2023-10-20
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
## 2023-10-27 ENCOUNTER — TELEPHONE (OUTPATIENT)
Dept: FAMILY MEDICINE CLINIC | Facility: CLINIC | Age: 68
End: 2023-10-27

## 2023-10-27 NOTE — TELEPHONE ENCOUNTER
"  Caller: Stepan Gaines \"Tomas\"    Relationship: Self    Best call back number: [phone]     What medication are you requesting: SOMETHING TO A DRY COUGH    What are your current symptoms: COUGH, DRAINAGE    How long have you been experiencing symptoms: MONTH    Have you had these symptoms before:    [x] Yes  [] No    Have you been treated for these symptoms before:   [x] Yes  [] No    If a prescription is needed, what is your preferred pharmacy and phone number:      Additional notes: PATIENT HAS A COUGH THAT IS NOT GOING AWAY AND IS WORSE AT NIGHT TIME. PATIENT WOULD LIKE TO SEE IF SOMETHING COULD BE CALLED IN FOR COUGH.         "

## 2023-10-30 RX ORDER — BENZONATATE 100 MG/1
100 CAPSULE ORAL 2 TIMES DAILY
Qty: 20 CAPSULE | Refills: 0 | Status: SHIPPED | OUTPATIENT
Start: 2023-10-30

## 2023-11-10 ENCOUNTER — APPOINTMENT (OUTPATIENT)
Dept: CT IMAGING | Facility: HOSPITAL | Age: 68
End: 2023-11-10
Payer: MEDICARE

## 2023-11-10 ENCOUNTER — HOSPITAL ENCOUNTER (EMERGENCY)
Facility: HOSPITAL | Age: 68
Discharge: LEFT BEFORE DISCHARGE | End: 2023-11-10
Attending: EMERGENCY MEDICINE
Payer: MEDICARE

## 2023-11-10 VITALS
BODY MASS INDEX: 23.85 KG/M2 | HEART RATE: 65 BPM | RESPIRATION RATE: 16 BRPM | DIASTOLIC BLOOD PRESSURE: 70 MMHG | OXYGEN SATURATION: 96 % | SYSTOLIC BLOOD PRESSURE: 120 MMHG | TEMPERATURE: 98.3 F | WEIGHT: 161 LBS | HEIGHT: 69 IN

## 2023-11-10 DIAGNOSIS — F10.10 ALCOHOL ABUSE: ICD-10-CM

## 2023-11-10 DIAGNOSIS — F10.920 ALCOHOLIC INTOXICATION WITHOUT COMPLICATION: Primary | ICD-10-CM

## 2023-11-10 DIAGNOSIS — R29.6 FREQUENT FALLS: ICD-10-CM

## 2023-11-10 LAB — ETHANOL BLD-MCNC: 319 MG/DL (ref 0–10)

## 2023-11-10 PROCEDURE — 36415 COLL VENOUS BLD VENIPUNCTURE: CPT

## 2023-11-10 PROCEDURE — 82077 ASSAY SPEC XCP UR&BREATH IA: CPT | Performed by: EMERGENCY MEDICINE

## 2023-11-10 PROCEDURE — 99284 EMERGENCY DEPT VISIT MOD MDM: CPT

## 2023-11-10 PROCEDURE — 70450 CT HEAD/BRAIN W/O DYE: CPT

## 2023-11-11 NOTE — ED PROVIDER NOTES
"Subjective   History of Present Illness  Patient presents to the emergency department via EMS after being unsteady at the [facility].  Patient states he was looking to get some broccoli.  He was unsteady and EMS was called.  Patient did not fall.  EMS was going to assist the patient home but he is unable to go more than about 10 steps without significant instability.  Patient has evidence of very well-known alcohol abuse history.  He reports to me today that he has had \"2 drinks\".  Patient does report a fall yesterday with right forehead contusion as well as a headache.  Patient refused care at that time.  Patient has been seen here many times before the emergency department for various alcohol related complaints.    History provided by:  Patient and EMS personnel      Review of Systems    Past Medical History:   Diagnosis Date    A-fib     Anxiety     Arthritis     Cancer     melanoma back    Cataract     COPD (chronic obstructive pulmonary disease)     Fainting     Hypertension        No Known Allergies    Past Surgical History:   Procedure Laterality Date    CARDIAC ELECTROPHYSIOLOGY PROCEDURE N/A 6/10/2020    Procedure: EP/Ablation Atypical AFL w ICE & SJM or Carto mapping, ASAP, pt has no meds that need held;  Surgeon: Beto Solomon DO;  Location: [facility] INVASIVE LOCATION;  Service: Cardiology;  Laterality: N/A;    CATARACT EXTRACTION      right eye    ELBOW ARTHROSCOPY Left     left elbow twice    KNEE ARTHROSCOPY Right     SHOULDER ARTHROSCOPY Right     left and right but had left shoulder surgery twice    TOE ARTHROPLASTY Right        Family History   Problem Relation Age of Onset    Hyperlipidemia Mother     Lung cancer Father     Diabetes Father     Kidney failure Father        Social History     Socioeconomic History    Marital status:    Tobacco Use    Smoking status: Every Day     Packs/day: .5     Types: Cigarettes    Smokeless tobacco: Never   Vaping Use    Vaping Use: Never used   Substance " and Sexual Activity    Alcohol use: Not Currently     Comment: 7-10 drinks/week    Drug use: Not Currently     Frequency: 7.0 times per week     Types: Marijuana     Comment: daily    Sexual activity: Defer           Objective   Physical Exam  Vitals and nursing note reviewed.   Constitutional:       General: He is not in acute distress.     Appearance: Normal appearance. He is not toxic-appearing.   HENT:      Head:      Comments: Right forehead contusion and abrasion.  Cardiovascular:      Rate and Rhythm: Normal rate and regular rhythm.      Pulses: Normal pulses.   Pulmonary:      Effort: Pulmonary effort is normal.      Breath sounds: Normal breath sounds.   Musculoskeletal:         General: No tenderness, deformity or signs of injury. Normal range of motion.   Skin:     General: Skin is warm and dry.   Neurological:      Mental Status: He is alert and oriented to person, place, and time.   Psychiatric:         Mood and Affect: Mood normal.         Behavior: Behavior normal.         Procedures           ED Course  ED Course as of 11/10/23 2047   Fri Nov 10, 2023   1937 CT Head Without Contrast  Personally reviewed CT scan of the head.  On my interpretation of the images there is no significant hemorrhage or mass effect visualized. [RS]   2025 Ethanol(!)  Patient with acute alcohol intoxication.  Patient advised when he came in that he had had 2 drinks.  His alcohol level here is 319.  We will observe the patient for the next few hours and then plan for discharge home. [RS]   2034 Apparently the patient eloped from the interim emergency department.  I was not able to see or talk to the patient prior to leaving.  I advised the nursing staff to notify security secondary to the patient's alcohol intoxication.  I intended to monitor the patient for a few more hours here in the emergency department.  I believe this alcohol level is likely standard operating procedure for this patient.  However, we wanted to ensure  that the patient was safe and stable for disposition back to his house. [RS]      ED Course User Index  [RS] Eb Vu MD                                           Medical Decision Making  Problems Addressed:  Alcohol abuse: complicated acute illness or injury  Alcoholic intoxication without complication: complicated acute illness or injury  Frequent falls: complicated acute illness or injury    Amount and/or Complexity of Data Reviewed  Independent Historian: EMS  External Data Reviewed: notes.  Labs:  Decision-making details documented in ED Course.  Radiology: ordered. Decision-making details documented in ED Course.        Final diagnoses:   Alcoholic intoxication without complication   Alcohol abuse   Frequent falls       ED Disposition  ED Disposition       ED Disposition   Eloped    Condition   --    Comment   --               No follow-up provider specified.       Medication List      No changes were made to your prescriptions during this visit.            Eb Vu MD  11/10/23 9792

## 2023-11-15 DIAGNOSIS — M54.2 NECK PAIN: ICD-10-CM

## 2023-11-15 RX ORDER — TIZANIDINE 4 MG/1
4 TABLET ORAL 3 TIMES DAILY
Qty: 30 TABLET | Refills: 1 | OUTPATIENT
Start: 2023-11-15

## 2023-11-16 ENCOUNTER — TELEPHONE (OUTPATIENT)
Dept: FAMILY MEDICINE CLINIC | Facility: CLINIC | Age: 68
End: 2023-11-16

## 2023-11-16 NOTE — TELEPHONE ENCOUNTER
"Caller: Stepan Gaines \"Tomas\"    Relationship: Self    Best call back number: [phone]     What is the medical concern/diagnosis: SEIZURES    What specialty or service is being requested: NEUROLOGY    What is the office location: [facility]    Any additional details: PATIENT NEEDS REFERRAL SENT TO ANOTHER NEUROLOGIST THAT CAN GET HIM IN SOONER. PLEASE ADVISE. PATIENT SAID HE WILL GO TO ANY PROVIDER EVEN IN [facility] IF NEEDED.          "

## 2023-11-16 NOTE — TELEPHONE ENCOUNTER
Can you please enter new neurology referral and specify another group? His appt with [facility] is in March. I'm unsure that he will be able to get in sooner elsewhere but that is what he requested.

## 2023-11-17 NOTE — TELEPHONE ENCOUNTER
"Caller: Stepan Gaines \"Tomas\"    Relationship: Self    Best call back number: [phone]     Requested Prescriptions:   Requested Prescriptions     Pending Prescriptions Disp Refills    ibuprofen (ADVIL,MOTRIN) 800 MG tablet 60 tablet 5     Sig: Take 1 tablet by mouth Every 6 (Six) Hours As Needed for Mild Pain.      Pharmacy where request should be sent: [facility] [id number] [address] - [phone]  - [phone] FX     Last office visit with prescribing clinician: 9/29/2023   Last telemedicine visit with prescribing clinician: Visit date not found   Next office visit with prescribing clinician: Visit date not found     Additional details provided by patient: PATIENT IS REQUESTING TO HAVE THIS SCRIPT FILLED FOR A 30 DAY SUPPLY.      SCRIPT INSTRUCTIONS  TAKE 1 EVERY 6 HOURS SO A QTY  FOR A 30 DAY SUPPLY    Does the patient have less than a 3 day supply:  [x] Yes  [] No    Would you like a call back once the refill request has been completed: [x] Yes [] No    If the office needs to give you a call back, can they leave a voicemail: [] Yes [] No    Paula Bermudez Rep   11/17/23 11:04 EST           "

## 2023-11-20 RX ORDER — IBUPROFEN 800 MG/1
800 TABLET ORAL EVERY 6 HOURS PRN
Qty: 120 TABLET | Refills: 5 | Status: SHIPPED | OUTPATIENT
Start: 2023-11-20

## 2023-11-27 RX ORDER — TRIAMTERENE AND HYDROCHLOROTHIAZIDE 37.5; 25 MG/1; MG/1
1 TABLET ORAL DAILY
Qty: 30 TABLET | Refills: 4 | Status: SHIPPED | OUTPATIENT
Start: 2023-11-27

## 2023-11-28 ENCOUNTER — TELEPHONE (OUTPATIENT)
Dept: FAMILY MEDICINE CLINIC | Facility: CLINIC | Age: 68
End: 2023-11-28
Payer: MEDICARE

## 2023-11-28 NOTE — TELEPHONE ENCOUNTER
"Caller: Stepan Gaines \"Tomas\"    Relationship: Self    Best call back number: [phone]    What medication are you requesting: Z-PACK, PREDNISONE, COUGH MEDICINE    What are your current symptoms: RIGHT EAR POPPING, COUGH, YELLOW DISCHARGE ON RIGHT SIDE OF NOSE    How long have you been experiencing symptoms: 1 DAY    Have you had these symptoms before:    [x] Yes  [] No    Have you been treated for these symptoms before:   [x] Yes  [] No    If a prescription is needed, what is your preferred pharmacy and phone number:    [facility] [phone]  Additional notes:    PATIENT HAS NO WAY OF GETTING TO AN APPOINTMENT AND HIS CELL PHONE ISN'T WORKING FOR A TELEHEALTH AND WOULD LIKE SOMETHING SENT TO PHARMACY. PLEASE ADVISE      "

## 2023-12-04 ENCOUNTER — TELEMEDICINE (OUTPATIENT)
Dept: FAMILY MEDICINE CLINIC | Facility: TELEHEALTH | Age: 68
End: 2023-12-04
Payer: MEDICARE

## 2023-12-04 DIAGNOSIS — J01.01 ACUTE RECURRENT MAXILLARY SINUSITIS: Primary | ICD-10-CM

## 2023-12-04 RX ORDER — AMOXICILLIN AND CLAVULANATE POTASSIUM 875; 125 MG/1; MG/1
1 TABLET, FILM COATED ORAL 2 TIMES DAILY
Qty: 20 TABLET | Refills: 0 | Status: SHIPPED | OUTPATIENT
Start: 2023-12-04 | End: 2023-12-14

## 2023-12-04 RX ORDER — BENZONATATE 100 MG/1
100 CAPSULE ORAL 3 TIMES DAILY PRN
Qty: 21 CAPSULE | Refills: 0 | Status: SHIPPED | OUTPATIENT
Start: 2023-12-04

## 2023-12-04 NOTE — PROGRESS NOTES
Subjective   Stepan Gaines is a 68 y.o. male.     History of Present Illness  He presents with c/o sinus pain and one-sided yellow nasal discharge. The current symptoms have been present for 4 weeks. He has frequent sinus problems and gets frequent sinus infections. He has been taking astelin spray and zyrtec. It has been over a month since he was past on antibiotics.       The following portions of the patient's history were reviewed and updated as appropriate: allergies, current medications, past family history, past medical history, past social history, past surgical history, and problem list.    Review of Systems   Constitutional:  Positive for fatigue and fever.   HENT:  Positive for congestion, postnasal drip, rhinorrhea, sinus pressure and sore throat.    Respiratory:  Positive for cough (frequent).    Neurological:  Positive for headache.       Objective   Physical Exam  Constitutional:       General: He is not in acute distress.     Appearance: He is well-developed. He is not diaphoretic.   HENT:      Nose:      Right Sinus: Maxillary sinus tenderness present.      Left Sinus: Maxillary sinus tenderness present.      Comments: R>L  Pulmonary:      Effort: Pulmonary effort is normal.   Neurological:      Mental Status: He is alert and oriented to person, place, and time.   Psychiatric:         Behavior: Behavior normal.           Assessment & Plan   Diagnoses and all orders for this visit:    1. Acute recurrent maxillary sinusitis (Primary)  -     amoxicillin-clavulanate (AUGMENTIN) 875-125 MG per tablet; Take 1 tablet by mouth 2 (Two) Times a Day for 10 days.  Dispense: 20 tablet; Refill: 0  -     benzonatate (Tessalon Perles) 100 MG capsule; Take 1 capsule by mouth 3 (Three) Times a Day As Needed for Cough.  Dispense: 21 capsule; Refill: 0                 The use of a video visit has been reviewed with the patient and verbal informed consent has been obtained. Myself and Stepan Gaines participated in  this visit. The patient is located in [place], [place] at home. I am located in [place], [place]. [employer] and [employer] were utilized. I spent 10 minutes in the patient's chart for this visit.

## 2023-12-04 NOTE — PATIENT INSTRUCTIONS
-Take all meds as prescribed even if you start feeling better  -May use tylenol/motrin or warm moist compress on the face for pain.   -May use saline nasal spray, netipot, humidified air  -If symptoms worsen or do not improve in 1 week follow up with urgent care or your primary care provider

## 2023-12-22 ENCOUNTER — TELEPHONE (OUTPATIENT)
Dept: FAMILY MEDICINE CLINIC | Facility: CLINIC | Age: 68
End: 2023-12-22

## 2023-12-22 NOTE — TELEPHONE ENCOUNTER
"Hub staff attempted to follow warm transfer process and was unsuccessful     Caller: Stepan Gaines \"Tomas\"    Relationship to patient: Self    Best call back number: [phone]     PATIENT CALLED IN STATING HE HAS HAD SOME SEIZURES AND DURING ONE OCCURRENCE FELL AND HIT HIS HEAD.     I DID NOT DIRECT HIM TO THE ER BECAUSE HE WAS NOT HAVING AN ACTIVE SEIZURE AND HEAD INJURY IS ON OUR WARM TRANSFER WORKFLOW.    WE GOT DISCONNECTED WHILE I WAS WAITING ON THE OFFICE TO . I HAVE TRIED CALLING HIM BACK WITH NO ANSWER. I LEFT HIM A MESSAGE TO CALL US BACK.    "

## 2024-02-09 ENCOUNTER — TELEPHONE (OUTPATIENT)
Dept: FAMILY MEDICINE CLINIC | Facility: CLINIC | Age: 69
End: 2024-02-09
Payer: MEDICARE

## 2024-02-09 DIAGNOSIS — F41.9 ANXIETY: ICD-10-CM

## 2024-02-09 DIAGNOSIS — G47.00 INSOMNIA, UNSPECIFIED TYPE: ICD-10-CM

## 2024-02-09 RX ORDER — ALPRAZOLAM 1 MG/1
TABLET ORAL
Qty: 30 TABLET | OUTPATIENT
Start: 2024-02-09

## 2024-02-12 DIAGNOSIS — G47.00 INSOMNIA, UNSPECIFIED TYPE: ICD-10-CM

## 2024-02-12 DIAGNOSIS — F41.9 ANXIETY: ICD-10-CM

## 2024-02-12 RX ORDER — ALPRAZOLAM 1 MG/1
1 TABLET ORAL NIGHTLY PRN
Qty: 15 TABLET | Refills: 0 | Status: SHIPPED | OUTPATIENT
Start: 2024-02-12

## 2024-02-12 NOTE — TELEPHONE ENCOUNTER
Patient is scheduled for an appointment on 02/26/2024. He could not do any earlier due to his mother passing away recently. Patient is requesting a refill for enough medication to get him to his next appointment

## 2024-02-19 ENCOUNTER — TELEPHONE (OUTPATIENT)
Dept: FAMILY MEDICINE CLINIC | Facility: CLINIC | Age: 69
End: 2024-02-19

## 2024-02-19 NOTE — TELEPHONE ENCOUNTER
Provider: John Argueta PA     Caller: DESTINY HERRMANN      Relationship to Patient:      Phone Number: [phone]     Reason for Call: CALLING TO NOTIFY PROVIDER THERE IS A NEW HEALTH RISK ASSESSMENT AND CARE PLAN IN THE PROVIDER PORTAL

## 2024-02-26 ENCOUNTER — OFFICE VISIT (OUTPATIENT)
Dept: FAMILY MEDICINE CLINIC | Facility: CLINIC | Age: 69
End: 2024-02-26
Payer: MEDICARE

## 2024-02-26 VITALS
BODY MASS INDEX: 23.58 KG/M2 | TEMPERATURE: 97.9 F | HEART RATE: 71 BPM | DIASTOLIC BLOOD PRESSURE: 62 MMHG | HEIGHT: 69 IN | RESPIRATION RATE: 16 BRPM | OXYGEN SATURATION: 98 % | SYSTOLIC BLOOD PRESSURE: 120 MMHG | WEIGHT: 159.2 LBS

## 2024-02-26 DIAGNOSIS — Z00.00 ROUTINE GENERAL MEDICAL EXAMINATION AT A HEALTH CARE FACILITY: ICD-10-CM

## 2024-02-26 DIAGNOSIS — F41.9 ANXIETY: ICD-10-CM

## 2024-02-26 DIAGNOSIS — Z00.00 MEDICARE ANNUAL WELLNESS VISIT, SUBSEQUENT: Primary | ICD-10-CM

## 2024-02-26 DIAGNOSIS — G47.00 INSOMNIA, UNSPECIFIED TYPE: ICD-10-CM

## 2024-02-26 DIAGNOSIS — Z12.11 SCREEN FOR COLON CANCER: ICD-10-CM

## 2024-02-26 DIAGNOSIS — F41.1 GAD (GENERALIZED ANXIETY DISORDER): ICD-10-CM

## 2024-02-26 DIAGNOSIS — F10.11 HISTORY OF ETOH ABUSE: ICD-10-CM

## 2024-02-26 PROCEDURE — G0439 PPPS, SUBSEQ VISIT: HCPCS | Performed by: PHYSICIAN ASSISTANT

## 2024-02-26 PROCEDURE — 99397 PER PM REEVAL EST PAT 65+ YR: CPT | Performed by: PHYSICIAN ASSISTANT

## 2024-02-26 PROCEDURE — 1170F FXNL STATUS ASSESSED: CPT | Performed by: PHYSICIAN ASSISTANT

## 2024-02-26 RX ORDER — ALPRAZOLAM 1 MG/1
1 TABLET ORAL NIGHTLY PRN
Qty: 30 TABLET | Refills: 2 | Status: SHIPPED | OUTPATIENT
Start: 2024-02-26

## 2024-02-26 RX ORDER — TOBRAMYCIN AND DEXAMETHASONE 3; 1 MG/ML; MG/ML
1 SUSPENSION/ DROPS OPHTHALMIC
Qty: 5 ML | Refills: 1 | Status: SHIPPED | OUTPATIENT
Start: 2024-02-26

## 2024-02-26 RX ORDER — METHYLPREDNISOLONE 4 MG/1
TABLET ORAL
Qty: 21 TABLET | Refills: 0 | Status: SHIPPED | OUTPATIENT
Start: 2024-02-26

## 2024-02-26 NOTE — PROGRESS NOTES
The ABCs of the Annual Wellness Visit  Subsequent Medicare Wellness Visit    Chief Complaint   Patient presents with    Medicare Wellness-subsequent     Not fasting. Due for Cologuard.    Anxiety     RF Xanax 1mg qhs    Nasal Congestion     PND, runny nose. Uses Astelin every day, occ Zyrtec    Med Refill     Req RF for Tobradex gtts-on med list      Subjective   History of Present Illness:  Stepan Gaines is a 68 y.o. male who presents for a Subsequent Medicare Wellness Visit.  Patient is a pleasant 68-year-old white male who comes in for preventive medical examination along with along with wellness examination patient has chronic anxiety needs refill of Xanax also sinus pressure sinus congestion history allergies take Zyrtec patient also needs refill of eyedrops also has chronic anxiety and needs refill of Xanax patient has history of alcohol abuse states he has not been drinking understands consequences of drinking pay states he stopped  The following portions of the patient's history were reviewed and   updated as appropriate: allergies, current medications, past family history, past medical history, past social history, past surgical history, and problem list.    Compared to one year ago, the patient feels his physical   health is the same.    Compared to one year ago, the patient feels his mental   health is the same.    Recent Hospitalizations:  He was not admitted to the hospital during the last year.       Current Medical Providers:  Patient Care Team:  John Argueta PA as PCP - General (Family Medicine)    Outpatient Medications Prior to Visit   Medication Sig Dispense Refill    acetaminophen (TYLENOL) 500 MG tablet Take 2 tablets by mouth Every 6 (Six) Hours As Needed for Mild Pain or Moderate Pain. 30 tablet 0    albuterol sulfate  (90 Base) MCG/ACT inhaler Inhale 2 puffs Every 4 (Four) Hours As Needed for Wheezing. 18 g 0    ALPRAZolam (Xanax) 1 MG tablet Take 1 tablet by mouth At  Night As Needed for Anxiety or Sleep. 15 tablet 0    Ascorbic Acid (VITAMIN C PO) Take  by mouth Daily.      azelastine (ASTELIN) 0.1 % nasal spray 2 sprays into the nostril(s) as directed by provider 2 (Two) Times a Day. Use in each nostril as directed 30 mL 12    cetirizine (zyrTEC) 10 MG tablet Take 1 tablet by mouth Daily. OTC      escitalopram (LEXAPRO) 10 MG tablet TAKE ONE TABLET BY MOUTH DAILY 30 tablet 5    fluocinonide (LIDEX) 0.05 % external solution Apply topically to the appropriate area as directed twice daily. 60 mL 5    ibuprofen (ADVIL,MOTRIN) 800 MG tablet Take 1 tablet by mouth Every 6 (Six) Hours As Needed for Mild Pain. 120 tablet 5    levETIRAcetam (KEPPRA) 500 MG tablet 1 po BID 60 tablet 5    metoprolol tartrate (LOPRESSOR) 50 MG tablet TAKE 1 TABLET BY MOUTH TWICE A  tablet 1    multivitamin with minerals tablet tablet Take 1 tablet by mouth Daily.      naphazoline-pheniramine (NAPHCON-A) 0.025-0.3 % ophthalmic solution 1 drop 4 (Four) Times a Day As Needed for Irritation.      Riboflavin (VITAMIN B2 PO) Take  by mouth Daily.      tiZANidine (ZANAFLEX) 4 MG tablet Take 1 tablet by mouth 3 (Three) Times a Day. 30 tablet 1    triamterene-hydrochlorothiazide (MAXZIDE-25) 37.5-25 MG per tablet Take 1 tablet by mouth Daily. 30 tablet 4    tobramycin-dexamethasone (TOBRADEX) 0.3-0.1 % ophthalmic suspension       benzonatate (Tessalon Perles) 100 MG capsule Take 1 capsule by mouth 3 (Three) Times a Day As Needed for Cough. 21 capsule 0     No facility-administered medications prior to visit.       No opioid medication identified on active medication list. I have reviewed chart for other potential  high risk medication/s and harmful drug interactions in the elderly.        Aspirin is not on active medication list.  Aspirin use is not indicated based on review of current medical condition/s. Risk of harm outweighs potential benefits.  .    Patient Active Problem List   Diagnosis    Syncope     "Palpitations    Nicotine abuse    SVT (supraventricular tachycardia)    Typical atrial flutter    Psychophysiological insomnia    Excessive daytime sleepiness     Advance Care Planning  ACP discussion was declined by the patient.      Review of Systems   Constitutional: Negative.    HENT: Negative.     Eyes: Negative.    Respiratory: Negative.     Cardiovascular: Negative.    Gastrointestinal: Negative.    Endocrine: Negative.    Genitourinary: Negative.    Musculoskeletal: Negative.    Skin: Negative.    Allergic/Immunologic: Negative.    Neurological: Negative.    Hematological: Negative.    Psychiatric/Behavioral: Negative.     All other systems reviewed and are negative.        Objective      Vitals:    02/26/24 1005   BP: 120/62   Pulse: 71   Resp: 16   Temp: 97.9 °F (36.6 °C)   TempSrc: Infrared   SpO2: 98%   Weight: 72.2 kg (159 lb 3.2 oz)   Height: 175.3 cm (69.02\")   PainSc:   4   PainLoc: Generalized     BMI Readings from Last 1 Encounters:   02/26/24 23.50 kg/m²   BMI is within normal parameters. No follow-up required.    Does the patient have evidence of cognitive impairment? No    Physical Exam  Vitals and nursing note reviewed.   Constitutional:       General: He is not in acute distress.     Appearance: Normal appearance. He is well-developed. He is not ill-appearing, toxic-appearing or diaphoretic.   HENT:      Head: Normocephalic and atraumatic.      Right Ear: External ear normal.      Left Ear: External ear normal.   Eyes:      Conjunctiva/sclera: Conjunctivae normal.      Pupils: Pupils are equal, round, and reactive to light.   Neck:      Thyroid: No thyromegaly.      Vascular: No carotid bruit.   Cardiovascular:      Rate and Rhythm: Normal rate and regular rhythm.      Pulses: Normal pulses.      Heart sounds: Normal heart sounds. No murmur heard.  Pulmonary:      Effort: Pulmonary effort is normal. No respiratory distress.      Breath sounds: Normal breath sounds.   Abdominal:      General: " Bowel sounds are normal.      Palpations: Abdomen is soft. There is no mass.      Tenderness: There is no abdominal tenderness.   Musculoskeletal:         General: No swelling. Normal range of motion.      Cervical back: Normal range of motion and neck supple.   Lymphadenopathy:      Cervical: No cervical adenopathy.   Skin:     General: Skin is warm and dry.      Findings: No lesion or rash.   Neurological:      Mental Status: He is alert and oriented to person, place, and time.      Cranial Nerves: No cranial nerve deficit.      Sensory: No sensory deficit.      Motor: No weakness.      Coordination: Coordination normal.      Gait: Gait normal.      Deep Tendon Reflexes: Reflexes are normal and symmetric.   Psychiatric:         Mood and Affect: Mood normal.         Behavior: Behavior normal.         Thought Content: Thought content normal.         Judgment: Judgment normal.                 HEALTH RISK ASSESSMENT    Smoking Status:  Social History     Tobacco Use   Smoking Status Every Day    Packs/day: .5    Types: Cigarettes   Smokeless Tobacco Never     Alcohol Consumption:  Social History     Substance and Sexual Activity   Alcohol Use Not Currently    Comment: 7-10 drinks/week     Fall Risk Screen:    [facility] Fall Risk Assessment was completed, and patient is at HIGH risk for falls. Assessment completed on:2024    Depression Screenin/29/2023    11:51 AM   PHQ-2/PHQ-9 Depression Screening   Little Interest or Pleasure in Doing Things 0-->not at all   Feeling Down, Depressed or Hopeless 0-->not at all   Trouble Falling or Staying Asleep, or Sleeping Too Much 0-->not at all   Feeling Tired or Having Little Energy 0-->not at all   Poor Appetite or Overeating 0-->not at all   Feeling Bad about Yourself - or that You are a Failure or Have Let Yourself or Your Family Down 0-->not at all   Trouble Concentrating on Things, Such as Reading the Newspaper or Watching Television 0-->not at all   Moving or  Speaking So Slowly that Other People Could Have Noticed? Or the Opposite - Being So Fidgety 0-->not at all   Thoughts that You Would be Better Off Dead or of Hurting Yourself in Some Way 0-->not at all   PHQ-9: Brief Depression Severity Measure Score 0   If You Checked Off Any Problems, How Difficult Have These Problems Made It For You to Do Your Work, Take Care of Things at Home, or Get Along with Other People? not difficult at all       Health Habits and Functional and Cognitive Screenin/26/2024    10:07 AM   Functional & Cognitive Status   Do you have difficulty preparing food and eating? No   Do you have difficulty bathing yourself, getting dressed or grooming yourself? No   Do you have difficulty using the toilet? No   Do you have difficulty moving around from place to place? No   Do you have trouble with steps or getting out of a bed or a chair? No   Current Diet Well Balanced Diet   Dental Exam Not up to date   Eye Exam Not up to date   Exercise (times per week) 7 times per week   Current Exercises Include Walking   Do you need help using the phone?  No   Are you deaf or do you have serious difficulty hearing?  No   Do you need help to go to places out of walking distance? Yes   Do you need help shopping? No   Do you need help preparing meals?  No   Do you need help with housework?  No   Do you need help with laundry? No   Do you need help taking your medications? No   Do you need help managing money? No   Do you ever drive or ride in a car without wearing a seat belt? No   Have you felt unusual stress, anger or loneliness in the last month? Yes   Who do you live with? Other   If you need help, do you have trouble finding someone available to you? No   Have you been bothered in the last four weeks by sexual problems? No   Do you have difficulty concentrating, remembering or making decisions? Yes       Age-appropriate Screening Schedule:  Refer to the list below for future screening recommendations  based on patient's age, sex and/or medical conditions. Orders for these recommended tests are listed in the plan section. The patient has been provided with a written plan.    Health Maintenance   Topic Date Due    COLORECTAL CANCER SCREENING  Never done    ZOSTER VACCINE (1 of 2) Never done    RSV Vaccine - Adults (1 - 1-dose 60+ series) Never done    HEPATITIS C SCREENING  Never done    Pneumococcal Vaccine 65+ (2 of 2 - PCV) 04/09/2018    COVID-19 Vaccine (4 - 2023-24 season) 09/01/2023    INFLUENZA VACCINE  03/31/2024 (Originally 8/1/2023)    ANNUAL WELLNESS VISIT  02/26/2025    TDAP/TD VACCINES (2 - Td or Tdap) 04/08/2027    AAA SCREEN (ONE-TIME)  Completed              Assessment & Plan     CMS Preventative Services Quick Reference  Risk Factors Identified During Encounter  Drug Use/Abuse Identified or Suspected: Patient advised to avoid people and places where drugs are used  The above risks/problems have been discussed with the patient.  Follow up actions/plans if indicated are seen below in the Assessment/Plan Section.  Pertinent information has been shared with the patient in the After Visit Summary.    Diagnoses and all orders for this visit:    1. Medicare annual wellness visit, subsequent (Primary)    2. Routine general medical examination at a health care facility  -     Comprehensive metabolic panel; Future  -     CBC (No Diff); Future  -     TSH; Future  -     Lipid Panel; Future    3. History of ETOH abuse  -     Ethanol level; Future    4. EZIO (generalized anxiety disorder)    5. Screen for colon cancer  -     Cologuard - Stool, Per Rectum; Future    Other orders  -     methylPREDNISolone (MEDROL) 4 MG dose pack; Take as directed on package instructions.  Dispense: 21 tablet; Refill: 0  -     tobramycin-dexAMETHasone (TOBRADEX) 0.3-0.1 % ophthalmic suspension; Administer 1 drop to both eyes Every 4 (Four) Hours While Awake.  Dispense: 5 mL; Refill: 1    Xanax 1 mg 1 p.o. nightly dispense 30 x 2  refills    Follow Up:  No follow-ups on file.     An After Visit Summary and PPPS were given to the patient.    As part of this patient's treatment plan, patient will be prescribed controlled substances. The patient has been made aware of appropriate use of such medications, including potential risk of somnolence, limited ability to drive and /or work safely, and potential for dependence or overdose. It has also been made clear that these medications are for use by this patient only, without concomitant use of alcohol or other substances unless prescribed.Controlled substance status of medication discussed with patient, discussed risks of medication including abuse potential and diversion potential and need to follow up for reevaluation appointment in order to receive further refills.    Part of this note may be an electronic transcription/translation of spoken language to printed text using the Dragon Dictation System.

## 2024-03-02 ENCOUNTER — HOSPITAL ENCOUNTER (EMERGENCY)
Facility: HOSPITAL | Age: 69
Discharge: HOME | End: 2024-03-02
Attending: EMERGENCY MEDICINE
Payer: MEDICARE

## 2024-03-02 ENCOUNTER — APPOINTMENT (OUTPATIENT)
Dept: GENERAL RADIOLOGY | Facility: HOSPITAL | Age: 69
End: 2024-03-02
Payer: MEDICARE

## 2024-03-02 VITALS
HEART RATE: 60 BPM | RESPIRATION RATE: 20 BRPM | OXYGEN SATURATION: 96 % | TEMPERATURE: 97.5 F | BODY MASS INDEX: 23.85 KG/M2 | HEIGHT: 69 IN | DIASTOLIC BLOOD PRESSURE: 65 MMHG | WEIGHT: 161 LBS | SYSTOLIC BLOOD PRESSURE: 109 MMHG

## 2024-03-02 DIAGNOSIS — S60.519A ABRASION, HAND W/O INFECTION: ICD-10-CM

## 2024-03-02 DIAGNOSIS — S60.229A CONTUSION OF HAND, UNSPECIFIED LATERALITY, INITIAL ENCOUNTER: Primary | ICD-10-CM

## 2024-03-02 PROCEDURE — 73120 X-RAY EXAM OF HAND: CPT

## 2024-03-02 PROCEDURE — 99283 EMERGENCY DEPT VISIT LOW MDM: CPT

## 2024-03-02 RX ORDER — HYDROCODONE BITARTRATE AND ACETAMINOPHEN 5; 325 MG/1; MG/1
1 TABLET ORAL ONCE
Status: COMPLETED | OUTPATIENT
Start: 2024-03-02 | End: 2024-03-02

## 2024-03-02 RX ADMIN — HYDROCODONE BITARTRATE AND ACETAMINOPHEN 1 TABLET: 5; 325 TABLET ORAL at 16:51
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
## 2024-03-04 ENCOUNTER — OFFICE VISIT (OUTPATIENT)
Dept: NEUROLOGY | Facility: CLINIC | Age: 69
End: 2024-03-04
Payer: MEDICARE

## 2024-03-04 VITALS
HEIGHT: 69 IN | WEIGHT: 163.2 LBS | OXYGEN SATURATION: 96 % | SYSTOLIC BLOOD PRESSURE: 140 MMHG | BODY MASS INDEX: 24.17 KG/M2 | DIASTOLIC BLOOD PRESSURE: 72 MMHG | HEART RATE: 69 BPM

## 2024-03-04 DIAGNOSIS — G40.909 NONINTRACTABLE EPILEPSY WITHOUT STATUS EPILEPTICUS, UNSPECIFIED EPILEPSY TYPE: Primary | ICD-10-CM

## 2024-03-04 PROCEDURE — 1159F MED LIST DOCD IN RCRD: CPT | Performed by: PSYCHIATRY & NEUROLOGY

## 2024-03-04 PROCEDURE — 1160F RVW MEDS BY RX/DR IN RCRD: CPT | Performed by: PSYCHIATRY & NEUROLOGY

## 2024-03-04 PROCEDURE — 99204 OFFICE O/P NEW MOD 45 MIN: CPT | Performed by: PSYCHIATRY & NEUROLOGY

## 2024-03-04 RX ORDER — LEVETIRACETAM 750 MG/1
750 TABLET ORAL 2 TIMES DAILY
Qty: 60 TABLET | Refills: 5 | Status: SHIPPED | OUTPATIENT
Start: 2024-03-04
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
## 2024-03-08 ENCOUNTER — APPOINTMENT (OUTPATIENT)
Dept: GENERAL RADIOLOGY | Facility: HOSPITAL | Age: 69
End: 2024-03-08
Payer: MEDICARE

## 2024-03-08 ENCOUNTER — HOSPITAL ENCOUNTER (EMERGENCY)
Facility: HOSPITAL | Age: 69
Discharge: HOME | End: 2024-03-08
Attending: EMERGENCY MEDICINE
Payer: MEDICARE

## 2024-03-08 ENCOUNTER — APPOINTMENT (OUTPATIENT)
Dept: CT IMAGING | Facility: HOSPITAL | Age: 69
End: 2024-03-08
Payer: MEDICARE

## 2024-03-08 ENCOUNTER — TELEPHONE (OUTPATIENT)
Dept: FAMILY MEDICINE CLINIC | Facility: CLINIC | Age: 69
End: 2024-03-08
Payer: MEDICARE

## 2024-03-08 ENCOUNTER — PATIENT ROUNDING (BHMG ONLY) (OUTPATIENT)
Dept: NEUROLOGY | Facility: CLINIC | Age: 69
End: 2024-03-08
Payer: MEDICARE

## 2024-03-08 VITALS
RESPIRATION RATE: 18 BRPM | OXYGEN SATURATION: 99 % | TEMPERATURE: 98.8 F | SYSTOLIC BLOOD PRESSURE: 108 MMHG | HEART RATE: 72 BPM | HEIGHT: 71 IN | DIASTOLIC BLOOD PRESSURE: 75 MMHG | WEIGHT: 200 LBS | BODY MASS INDEX: 28 KG/M2
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
## 2024-03-08 DIAGNOSIS — S00.83XA CONTUSION OF FACE, INITIAL ENCOUNTER: ICD-10-CM

## 2024-03-08 DIAGNOSIS — F10.220 ALCOHOL INTOXICATION IN ACTIVE ALCOHOLIC WITHOUT COMPLICATION: ICD-10-CM

## 2024-03-08 DIAGNOSIS — S80.01XA CONTUSION OF RIGHT KNEE, INITIAL ENCOUNTER: ICD-10-CM

## 2024-03-08 LAB
ALBUMIN SERPL-MCNC: 4 G/DL (ref 3.5–5.2)
ALBUMIN/GLOB SERPL: 1.4 G/DL
ALP SERPL-CCNC: 88 U/L (ref 39–117)
ALT SERPL W P-5'-P-CCNC: 46 U/L (ref 1–41)
ANION GAP SERPL CALCULATED.3IONS-SCNC: 8 MMOL/L (ref 5–15)
AST SERPL-CCNC: 51 U/L (ref 1–40)
BASOPHILS # BLD AUTO: 0.09 10*3/MM3 (ref 0–0.2)
BASOPHILS NFR BLD AUTO: 1.2 % (ref 0–1.5)
BILIRUB SERPL-MCNC: 0.2 MG/DL (ref 0–1.2)
BUN SERPL-MCNC: 17 MG/DL (ref 8–23)
BUN/CREAT SERPL: 23.6 (ref 7–25)
CALCIUM SPEC-SCNC: 8.3 MG/DL (ref 8.6–10.5)
CHLORIDE SERPL-SCNC: 103 MMOL/L (ref 98–107)
CO2 SERPL-SCNC: 27 MMOL/L (ref 22–29)
CREAT SERPL-MCNC: 0.72 MG/DL (ref 0.76–1.27)
D-LACTATE SERPL-SCNC: 0.8 MMOL/L (ref 0.5–2)
DEPRECATED RDW RBC AUTO: 47 FL (ref 37–54)
EGFRCR SERPLBLD CKD-EPI 2021: 100.1 ML/MIN/1.73
EOSINOPHIL # BLD AUTO: 0.32 10*3/MM3 (ref 0–0.4)
EOSINOPHIL NFR BLD AUTO: 4.4 % (ref 0.3–6.2)
ERYTHROCYTE [DISTWIDTH] IN BLOOD BY AUTOMATED COUNT: 12.6 % (ref 12.3–15.4)
ETHANOL BLD-MCNC: 251 MG/DL (ref 0–10)
GLOBULIN UR ELPH-MCNC: 2.8 GM/DL
GLUCOSE SERPL-MCNC: 91 MG/DL (ref 65–99)
HCT VFR BLD AUTO: 43.3 % (ref 37.5–51)
HGB BLD-MCNC: 14.6 G/DL (ref 13–17.7)
IMM GRANULOCYTES # BLD AUTO: 0.04 10*3/MM3 (ref 0–0.05)
IMM GRANULOCYTES NFR BLD AUTO: 0.6 % (ref 0–0.5)
LYMPHOCYTES # BLD AUTO: 3.2 10*3/MM3 (ref 0.7–3.1)
LYMPHOCYTES NFR BLD AUTO: 44.3 % (ref 19.6–45.3)
MCH RBC QN AUTO: 33.6 PG (ref 26.6–33)
MCHC RBC AUTO-ENTMCNC: 33.7 G/DL (ref 31.5–35.7)
MCV RBC AUTO: 99.8 FL (ref 79–97)
MONOCYTES # BLD AUTO: 1.03 10*3/MM3 (ref 0.1–0.9)
MONOCYTES NFR BLD AUTO: 14.2 % (ref 5–12)
NEUTROPHILS NFR BLD AUTO: 2.55 10*3/MM3 (ref 1.7–7)
NEUTROPHILS NFR BLD AUTO: 35.3 % (ref 42.7–76)
NRBC BLD AUTO-RTO: 0 /100 WBC (ref 0–0.2)
PLATELET # BLD AUTO: 180 10*3/MM3 (ref 140–450)
PMV BLD AUTO: 9.7 FL (ref 6–12)
POTASSIUM SERPL-SCNC: 4.2 MMOL/L (ref 3.5–5.2)
PROT SERPL-MCNC: 6.8 G/DL (ref 6–8.5)
RBC # BLD AUTO: 4.34 10*6/MM3 (ref 4.14–5.8)
SODIUM SERPL-SCNC: 138 MMOL/L (ref 136–145)
TROPONIN T SERPL HS-MCNC: 12 NG/L
WBC NRBC COR # BLD AUTO: 7.23 10*3/MM3 (ref 3.4–10.8)

## 2024-03-08 PROCEDURE — 82077 ASSAY SPEC XCP UR&BREATH IA: CPT | Performed by: EMERGENCY MEDICINE

## 2024-03-08 PROCEDURE — 25010000002 KETOROLAC TROMETHAMINE PER 15 MG: Performed by: EMERGENCY MEDICINE

## 2024-03-08 PROCEDURE — 73560 X-RAY EXAM OF KNEE 1 OR 2: CPT

## 2024-03-08 PROCEDURE — 96375 TX/PRO/DX INJ NEW DRUG ADDON: CPT

## 2024-03-08 PROCEDURE — 70450 CT HEAD/BRAIN W/O DYE: CPT

## 2024-03-08 PROCEDURE — 84484 ASSAY OF TROPONIN QUANT: CPT | Performed by: EMERGENCY MEDICINE

## 2024-03-08 PROCEDURE — 80053 COMPREHEN METABOLIC PANEL: CPT | Performed by: EMERGENCY MEDICINE

## 2024-03-08 PROCEDURE — 99284 EMERGENCY DEPT VISIT MOD MDM: CPT

## 2024-03-08 PROCEDURE — 83605 ASSAY OF LACTIC ACID: CPT | Performed by: EMERGENCY MEDICINE

## 2024-03-08 PROCEDURE — 73030 X-RAY EXAM OF SHOULDER: CPT

## 2024-03-08 PROCEDURE — 96374 THER/PROPH/DIAG INJ IV PUSH: CPT

## 2024-03-08 PROCEDURE — 85025 COMPLETE CBC W/AUTO DIFF WBC: CPT | Performed by: EMERGENCY MEDICINE

## 2024-03-08 RX ORDER — KETOROLAC TROMETHAMINE 15 MG/ML
15 INJECTION, SOLUTION INTRAMUSCULAR; INTRAVENOUS ONCE
Status: COMPLETED | OUTPATIENT
Start: 2024-03-08 | End: 2024-03-08

## 2024-03-08 RX ORDER — FAMOTIDINE 10 MG/ML
20 INJECTION, SOLUTION INTRAVENOUS ONCE
Status: COMPLETED | OUTPATIENT
Start: 2024-03-08 | End: 2024-03-08

## 2024-03-08 RX ORDER — ACETAMINOPHEN 500 MG
1000 TABLET ORAL ONCE
Status: COMPLETED | OUTPATIENT
Start: 2024-03-08 | End: 2024-03-08

## 2024-03-08 RX ORDER — SODIUM CHLORIDE 0.9 % (FLUSH) 0.9 %
10 SYRINGE (ML) INJECTION AS NEEDED
Status: DISCONTINUED | OUTPATIENT
Start: 2024-03-08 | End: 2024-03-08 | Stop reason: HOSPADM

## 2024-03-08 RX ADMIN — ACETAMINOPHEN 1000 MG: 500 TABLET ORAL at 19:03

## 2024-03-08 RX ADMIN — KETOROLAC TROMETHAMINE 15 MG: 15 INJECTION, SOLUTION INTRAMUSCULAR; INTRAVENOUS at 19:03

## 2024-03-08 RX ADMIN — FAMOTIDINE 20 MG: 10 INJECTION, SOLUTION INTRAVENOUS at 19:03

## 2024-03-08 NOTE — ED PROVIDER NOTES
Subjective   History of Present Illness  Patient is a 68-year-old male presenting to the emergency department to initially reported that he fell and lost consciousness.  However, later on my evaluation he advised that he got up out of bed and tripped over the carpet and fell onto the floor.  Patient has abrasion and contusion to the right side of the face and forehead.  Initially reported some right shoulder discomfort as well.  Later he complained of some right knee pain with a sharp piercing sensation.  Patient is well-known to the emergency department with long history of falls related to alcohol use.    History provided by:  Patient and EMS personnel      Review of Systems    Past Medical History:   Diagnosis Date    A-fib     Anxiety     Arthritis     Cancer     melanoma back    Cataract     COPD (chronic obstructive pulmonary disease)     Fainting     Hypertension        No Known Allergies    Past Surgical History:   Procedure Laterality Date    CARDIAC ELECTROPHYSIOLOGY PROCEDURE N/A 6/10/2020    Procedure: EP/Ablation Atypical AFL w ICE & SJM or Carto mapping, ASAP, pt has no meds that need held;  Surgeon: Beto Solomon DO;  Location: [facility] INVASIVE LOCATION;  Service: Cardiology;  Laterality: N/A;    CATARACT EXTRACTION      right eye    ELBOW ARTHROSCOPY Left     left elbow twice    KNEE ARTHROSCOPY Right     SHOULDER ARTHROSCOPY Right     left and right but had left shoulder surgery twice    TOE ARTHROPLASTY Right        Family History   Problem Relation Age of Onset    Hyperlipidemia Mother     Lung cancer Father     Diabetes Father     Kidney failure Father        Social History     Socioeconomic History    Marital status:    Tobacco Use    Smoking status: Every Day     Current packs/day: 0.50     Types: Cigarettes     Passive exposure: Current    Smokeless tobacco: Never   Vaping Use    Vaping status: Never Used   Substance and Sexual Activity    Alcohol use: Not Currently     Comment:  7-10 drinks/week    Drug use: Not Currently     Frequency: 7.0 times per week     Types: Marijuana     Comment: daily    Sexual activity: Defer           Objective   Physical Exam  Vitals and nursing note reviewed.   Constitutional:       General: He is not in acute distress.     Appearance: He is not toxic-appearing.   HENT:      Head:      Comments: Abrasion and superficial laceration to the right periorbital region and the right eyebrow.  It is hemostatic.  Mild contusion of the area as well.  Extraocular motions are intact.  Pupils are equal and round.  Cardiovascular:      Rate and Rhythm: Normal rate and regular rhythm.      Pulses: Normal pulses.   Pulmonary:      Effort: Pulmonary effort is normal. No respiratory distress.      Breath sounds: Normal breath sounds.   Abdominal:      Palpations: Abdomen is soft.      Tenderness: There is no abdominal tenderness.   Musculoskeletal:      Comments: Mild tenderness of the right shoulder with no gross deformity.  Mild tenderness of the right knee with abrasion to the anterior aspect of the knee.  Neurovascular intact.   Skin:     General: Skin is warm and dry.      Comments: Abrasion to the anterior aspect of the right knee.  Neurovascular intact   Neurological:      Mental Status: He is alert and oriented to person, place, and time.   Psychiatric:         Mood and Affect: Mood normal.         Behavior: Behavior normal.         Procedures           ED Course  ED Course as of 03/08/24 2031   Fri Mar 08, 2024   1822 Patient initially reported to the nurse and the student on evaluation that he did not remember falling.  However, on my evaluation he states that he got up out of bed and tripped over the carpet and fell face first.  Patient initially stated on the evaluation that he had headache with right shoulder pain.  On my evaluation he reports he has head pain as well as right knee pain. [RS]   1856 Clean the wounds on the patient's forehead and they are all  superficial abrasions with no primary closure indicated. [RS]   1856 XR Knee 1 or 2 View Right  I personally reviewed the images of the right knee and the right shoulder.  On my interpretation there are degenerative changes but no displaced fracture visualized. [RS]   1933 CT Head Without Contrast  Personally viewed CT images of the head.  My interpretation there is no hemorrhage or mass effect [RS]   1938  we advised the patient of the findings of the imaging studies.  Patient advised at this point he was going to leave.  We advised that we wanted to monitor him further secondary to his alcohol use and concern for intoxication.  The patient is alert and oriented. [RS]   1945 Patient is now insisting on going home.  I advised that I do not want him to drive or walk.  I talked with the nurse and he will arrange transportation for the patient back to his home.  The patient remains clinically sober, alert and oriented.  He is not to the point at this time to hold him against his will.  Patient has the capacity for self-determination.  Will allow him to request discharge at this time. I have reviewed results, considerations, and diagnosis with the patient and/or their representative. Anticipatory guidance provided. Follow-up plan reviewed. Precautions for acute return for re-evaluations also reviewed. This including potential for worsening of the presenting condition and need for further evaluation, admission, and/or intervention as indicated. Opportunity to as questions provided. I advised them to return for any concerns and stressed the importance of timely follow-up and outpatient services. They verbalized understanding.   [RS]   1946 Note to patient: The 21st Century Cures Act makes medical notes like these available to patients in the interest of transparency. However, be advised this is a medical document. It is intended as peer to peer communication. It is written in medical language and may contain abbreviations  or verbiage that are unfamiliar. It may appear blunt or direct. Medical documents are intended to carry relevant information, facts as evident, and the clinical opinion of the physician/NPP.   [RS]      ED Course User Index  [RS] Eb Vu MD                                             Medical Decision Making  Problems Addressed:  Alcohol intoxication in active alcoholic without complication: complicated acute illness or injury  Contusion of face, initial encounter: complicated acute illness or injury  Contusion of right knee, initial encounter: complicated acute illness or injury  Fall, initial encounter: complicated acute illness or injury    Amount and/or Complexity of Data Reviewed  Independent Historian: EMS  External Data Reviewed: radiology and notes.  Labs: ordered.  Radiology: ordered. Decision-making details documented in ED Course.    Risk  OTC drugs.  Prescription drug management.        Final diagnoses:   Fall, initial encounter   Contusion of face, initial encounter   Contusion of right knee, initial encounter   Alcohol intoxication in active alcoholic without complication       ED Disposition  ED Disposition       ED Disposition   Discharge    Condition   Stable    Comment   --               John Argueta, PA  [address]  [place] [address]  [phone]          [facility] EMERGENCY DEPARTMENT  [address] [id number] [phone]    As needed, If symptoms worsen or ANY concerns.         Medication List      No changes were made to your prescriptions during this visit.            Eb Vu MD  03/08/24 2031

## 2024-03-08 NOTE — TELEPHONE ENCOUNTER
"FYI- Pt called reporting that he had fell and hit his head and that one of his legs hurt and felt like his knee was moving around. Asked patient if there was someone who could take him to the ER, he said no. Advised patient that he really needed to go to the ER. Pt stated that he thought he had a concussion and \"felt funny\". Pt reported that he has epilepsy and has seizures. Pt stated that he would call the ambulance. At the end of the call a woman was heard in the background.   "

## 2024-03-11 ENCOUNTER — TELEPHONE (OUTPATIENT)
Dept: NEUROLOGY | Facility: CLINIC | Age: 69
End: 2024-03-11
Payer: MEDICARE

## 2024-03-25 RX ORDER — METOPROLOL TARTRATE 50 MG/1
TABLET, FILM COATED ORAL
Qty: 180 TABLET | Refills: 1 | Status: SHIPPED | OUTPATIENT
Start: 2024-03-25

## 2024-03-27 DIAGNOSIS — F41.9 ANXIETY: ICD-10-CM

## 2024-03-27 RX ORDER — ESCITALOPRAM OXALATE 10 MG/1
10 TABLET ORAL DAILY
Qty: 30 TABLET | Refills: 5 | Status: SHIPPED | OUTPATIENT
Start: 2024-03-27

## 2024-03-27 NOTE — TELEPHONE ENCOUNTER
Spoke with Alicia, friend, advised that there are very few offices that with take both Medicaid and Medicare insurances. Advised that [employer] [facility] can and said they have openings soon. P: [phone]. Also advised that [facility] can see him soon generally. P: [phone] and is open until 6pm. Advised that the radha can also see and treat him if he gets really bad. She stated that he has started breaking things in his house.
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
## 2024-03-28 NOTE — TELEPHONE ENCOUNTER
LVM for pt that [facility] said they can work him in Monday if he can call them. P: [phone] with provider Vero Dietrich.

## 2024-03-31 ENCOUNTER — APPOINTMENT (OUTPATIENT)
Dept: GENERAL RADIOLOGY | Facility: HOSPITAL | Age: 69
End: 2024-03-31
Payer: MEDICARE

## 2024-03-31 ENCOUNTER — HOSPITAL ENCOUNTER (EMERGENCY)
Facility: HOSPITAL | Age: 69
Discharge: LEFT BEFORE DISCHARGE | End: 2024-03-31
Attending: EMERGENCY MEDICINE | Admitting: EMERGENCY MEDICINE
Payer: MEDICARE

## 2024-03-31 VITALS
BODY MASS INDEX: 23.99 KG/M2 | RESPIRATION RATE: 20 BRPM | DIASTOLIC BLOOD PRESSURE: 59 MMHG | HEART RATE: 93 BPM | OXYGEN SATURATION: 95 % | WEIGHT: 162 LBS | TEMPERATURE: 97.6 F | SYSTOLIC BLOOD PRESSURE: 115 MMHG | HEIGHT: 69 IN

## 2024-03-31 PROCEDURE — 99283 EMERGENCY DEPT VISIT LOW MDM: CPT

## 2024-03-31 PROCEDURE — 72100 X-RAY EXAM L-S SPINE 2/3 VWS: CPT

## 2024-03-31 PROCEDURE — 96372 THER/PROPH/DIAG INJ SC/IM: CPT

## 2024-03-31 PROCEDURE — 73502 X-RAY EXAM HIP UNI 2-3 VIEWS: CPT

## 2024-03-31 PROCEDURE — 25010000002 KETOROLAC TROMETHAMINE PER 15 MG: Performed by: PHYSICIAN ASSISTANT

## 2024-03-31 RX ORDER — CYCLOBENZAPRINE HCL 10 MG
10 TABLET ORAL 3 TIMES DAILY PRN
Qty: 12 TABLET | Refills: 0 | Status: SHIPPED | OUTPATIENT
Start: 2024-03-31

## 2024-03-31 RX ORDER — KETOROLAC TROMETHAMINE 30 MG/ML
30 INJECTION, SOLUTION INTRAMUSCULAR; INTRAVENOUS ONCE
Status: COMPLETED | OUTPATIENT
Start: 2024-03-31 | End: 2024-03-31

## 2024-03-31 RX ADMIN — KETOROLAC TROMETHAMINE 30 MG: 30 INJECTION, SOLUTION INTRAMUSCULAR; INTRAVENOUS at 15:45

## 2024-03-31 NOTE — ED PROVIDER NOTES
Subjective   History of Present Illness  68-year-old male presents emergency department today after a fall having hip and lumbar pain.  He states that he slipped on a sheet dryer sheet that was in the floor of his bathroom.  He slipped and fell landed on his left buttock and back.  He has been using crutches to get around although he is able to bear weight.  He did not strike his head he did not have any other injuries.  His fall occurred yesterday.    History provided by:  Patient   used: No    Fall  Mechanism of injury: fall    Injury location: Left hip lumbar spine.  Incident location:  Home  Time since incident:  24 hours  Arrived directly from scene: no    Fall:     Fall occurred:  Walking    Impact surface:  Hard floor    Point of impact: Left hip.  Associated symptoms: no abdominal pain, no blindness, no headaches, no hearing loss, no loss of consciousness, no neck pain, no seizures and no vomiting    Risk factors: no anticoagulation therapy, no beta blocker therapy, no COPD, no diabetes, no dialysis and no pacemaker        Review of Systems   HENT:  Negative for hearing loss.    Eyes:  Negative for blindness.   Gastrointestinal:  Negative for abdominal pain and vomiting.   Musculoskeletal:  Negative for neck pain.   Neurological:  Negative for seizures, loss of consciousness and headaches.       Past Medical History:   Diagnosis Date    A-fib     Anxiety     Arthritis     Cancer     melanoma back    Cataract     COPD (chronic obstructive pulmonary disease)     Fainting     Hypertension        No Known Allergies    Past Surgical History:   Procedure Laterality Date    CARDIAC ELECTROPHYSIOLOGY PROCEDURE N/A 6/10/2020    Procedure: EP/Ablation Atypical AFL w ICE & SJM or Carto mapping, ASAP, pt has no meds that need held;  Surgeon: Beto Solomon DO;  Location: [facility] INVASIVE LOCATION;  Service: Cardiology;  Laterality: N/A;    CATARACT EXTRACTION      right eye    ELBOW ARTHROSCOPY  Left     left elbow twice    KNEE ARTHROSCOPY Right     SHOULDER ARTHROSCOPY Right     left and right but had left shoulder surgery twice    TOE ARTHROPLASTY Right        Family History   Problem Relation Age of Onset    Hyperlipidemia Mother     Lung cancer Father     Diabetes Father     Kidney failure Father        Social History     Socioeconomic History    Marital status:    Tobacco Use    Smoking status: Every Day     Current packs/day: 0.50     Types: Cigarettes     Passive exposure: Current    Smokeless tobacco: Never   Vaping Use    Vaping status: Never Used   Substance and Sexual Activity    Alcohol use: Not Currently     Comment: 7-10 drinks/week    Drug use: Not Currently     Frequency: 7.0 times per week     Types: Marijuana     Comment: daily    Sexual activity: Defer           Objective   Physical Exam  Vitals and nursing note reviewed.   Constitutional:       Appearance: He is well-developed.      Comments: Using crutches but to get up to go to the hallway did not use the crutches and ambulated.  Rolls over in the bed without any assistance there is no shortening or external rotation.  No bruising or ecchymosis over the hip greater trochanter SI region or lumbar spine.  Posterior tib ostracized pedis pulses are palpable cap refill less than 2 seconds sensations intact.   HENT:      Head: Normocephalic and atraumatic.      Right Ear: External ear normal.      Left Ear: External ear normal.      Nose: Nose normal.   Eyes:      General: No scleral icterus.     Conjunctiva/sclera: Conjunctivae normal.      Pupils: Pupils are equal, round, and reactive to light.   Neck:      Thyroid: No thyromegaly.   Cardiovascular:      Rate and Rhythm: Normal rate and regular rhythm.      Heart sounds: Normal heart sounds.   Pulmonary:      Effort: Pulmonary effort is normal. No respiratory distress.      Breath sounds: Normal breath sounds. No wheezing or rales.   Chest:      Chest wall: No tenderness.    Abdominal:      General: Bowel sounds are normal. There is no distension.      Palpations: Abdomen is soft.      Tenderness: There is no abdominal tenderness.   Musculoskeletal:         General: Normal range of motion.      Cervical back: Normal range of motion.   Lymphadenopathy:      Cervical: No cervical adenopathy.   Skin:     General: Skin is warm and dry.   Neurological:      Mental Status: He is alert and oriented to person, place, and time.      Cranial Nerves: No cranial nerve deficit.      Coordination: Coordination normal.      Deep Tendon Reflexes: Reflexes are normal and symmetric. Reflexes normal.   Psychiatric:         Behavior: Behavior normal.         Thought Content: Thought content normal.         Judgment: Judgment normal.         Procedures           ED Course  ED Course as of 03/31/24 1659   Sun Mar 31, 2024   1619 Mr. Gaines became belligerent yelling screaming stating that the reason he came here was to get some Norco 7.5's.  He needs to get some rest he would really appreciate me dispensing 7-8 of these.  I advised him to not go to give him any narcotics for a bruise or pulled muscle.  He became angry and told me to come back or ever again and used his crutches to get out. [NIKA]   1621 Mr. Gaines states that he came out here for nothing since I am not going to give him any narcotics.  He stated his he walked past the nurses station he is going to go by drugs out on the street. [NIKA]   1627 X-rays of the left hip and pelvis and lumbar spine were all negative.  Mr. Gaines became very aggravated was not good to get narcotics please see the other note.  He eloped [NIKA]      ED Course User Index  [NIKA] Tanvir Rios PA                                   No results found for this or any previous visit (from the past 24 hour(s)).  Note: In addition to lab results from this visit, the labs listed above may include labs taken at another facility or during a different encounter within the last 24  "hours. Please correlate lab times with ED admission and discharge times for further clarification of the services performed during this visit.    XR Spine Lumbar 2 or 3 View   Final Result   Impression:   Vertebral body heights are maintained without evidence of acute fracture and alignment is anatomic without evidence of listhesis or subluxation. Spondylosis changes are evident with some areas of disc space height loss, facet arthropathy and osteophyte    formation, most notable L4-5.          Electronically Signed: Celestino An MD     3/31/2024 4:05 PM EDT     Workstation ID: [id number]      XR Hip With or Without Pelvis 2 - 3 View Left   Final Result   Impression:   AP view of the pelvis and additional views of the left hip were obtained. No acute fracture is identified. Bony pelvis appears intact. Left hip appears congruent. Right hip and sacroiliac joints are unremarkable. Soft tissues demonstrate no acute    abnormality. Vascular calcifications are seen.      If there is suspicion for occult fracture, further evaluation with noncontrast CT may be considered.         Electronically Signed: Rogerio Lobo MD     3/31/2024 4:05 PM EDT     Workstation ID: [id number]        Vitals:    03/31/24 1423   BP: 115/59   BP Location: Left arm   Patient Position: Sitting   Pulse: 93   Resp: 20   Temp: 97.6 °F (36.4 °C)   TempSrc: Oral   SpO2: 95%   Weight: 73.5 kg (162 lb)   Height: 175.3 cm (69\")     Medications   ketorolac (TORADOL) injection 30 mg (30 mg Intramuscular Given 3/31/24 1545)     ECG/EMG Results (last 24 hours)       ** No results found for the last 24 hours. **          No orders to display                 Medical Decision Making  Amount and/or Complexity of Data Reviewed  Radiology: ordered. Decision-making details documented in ED Course.    Risk  Prescription drug management.        Final diagnoses:   None       ED Disposition  ED Disposition       ED Disposition   Eloped    Condition   --    Comment   -- "               No follow-up provider specified.       Medication List        New Prescriptions      cyclobenzaprine 10 MG tablet  Commonly known as: FLEXERIL  Take 1 tablet by mouth 3 (Three) Times a Day As Needed for Muscle Spasms.               Where to Get Your Medications        These medications were sent to [id number] - [place], [place] - [address] - [phone]  - [phone]   [address], [facility] [address]      Phone: [phone]   cyclobenzaprine 10 MG tablet            Tanvir Rios PA  04/01/24 9375

## 2024-04-01 DIAGNOSIS — M54.2 NECK PAIN: ICD-10-CM

## 2024-04-01 RX ORDER — TIZANIDINE 4 MG/1
4 TABLET ORAL 3 TIMES DAILY
Qty: 30 TABLET | Refills: 1 | Status: SHIPPED | OUTPATIENT
Start: 2024-04-01

## 2024-04-23 RX ORDER — TRIAMTERENE AND HYDROCHLOROTHIAZIDE 37.5; 25 MG/1; MG/1
1 TABLET ORAL DAILY
Qty: 30 TABLET | Refills: 3 | Status: SHIPPED | OUTPATIENT
Start: 2024-04-23

## 2024-04-24 ENCOUNTER — TELEPHONE (OUTPATIENT)
Dept: NEUROLOGY | Facility: CLINIC | Age: 69
End: 2024-04-24
Payer: MEDICARE

## 2024-04-24 NOTE — TELEPHONE ENCOUNTER
Provider: NICOLE CONNELLY MD    Caller: MAYE    Relationship to Patient: SELF    Phone Number: [phone]    Reason for Call: CALLING REGARDING THE MRI FOR 4-25-24 AND THE EEG ON 5-2-245.  STATED HE WAS TOLD TO STOP HIS KEPPRA AND XANEX THE NIGHT BEFORE THE PROCEDURE.  STATED HE WANTS TO KNOW WHICH PROCEDURE HE NEEDS TO STOP THE MEDICATION FOR?    When was the patient last seen:     PLEASE CALL & ADVISE

## 2024-04-25 ENCOUNTER — HOSPITAL ENCOUNTER (OUTPATIENT)
Dept: MRI IMAGING | Facility: HOSPITAL | Age: 69
Discharge: HOME | End: 2024-04-25
Admitting: PSYCHIATRY & NEUROLOGY
Payer: MEDICARE

## 2024-04-25 DIAGNOSIS — G40.909 NONINTRACTABLE EPILEPSY WITHOUT STATUS EPILEPTICUS, UNSPECIFIED EPILEPSY TYPE: ICD-10-CM

## 2024-04-25 PROCEDURE — 70551 MRI BRAIN STEM W/O DYE: CPT

## 2024-04-25 NOTE — TELEPHONE ENCOUNTER
Dr. Parekh, patient took his Xanax last night due to confusion of directions. Patient states he is calling to reschedule his MRI.

## 2024-04-29 ENCOUNTER — HOSPITAL ENCOUNTER (EMERGENCY)
Facility: HOSPITAL | Age: 69
Discharge: HOME | End: 2024-04-30
Attending: EMERGENCY MEDICINE | Admitting: EMERGENCY MEDICINE
Payer: MEDICARE

## 2024-04-29 ENCOUNTER — APPOINTMENT (OUTPATIENT)
Dept: CT IMAGING | Facility: HOSPITAL | Age: 69
End: 2024-04-29
Payer: MEDICARE

## 2024-04-29 VITALS
HEART RATE: 82 BPM | TEMPERATURE: 98.5 F | RESPIRATION RATE: 16 BRPM | OXYGEN SATURATION: 98 % | DIASTOLIC BLOOD PRESSURE: 82 MMHG | SYSTOLIC BLOOD PRESSURE: 126 MMHG

## 2024-04-29 DIAGNOSIS — F17.200 TOBACCO DEPENDENCE: ICD-10-CM

## 2024-04-29 DIAGNOSIS — G89.29 CHRONIC LEFT HIP PAIN: ICD-10-CM

## 2024-04-29 DIAGNOSIS — Z87.09 HISTORY OF COPD: ICD-10-CM

## 2024-04-29 DIAGNOSIS — M51.36 DEGENERATIVE DISC DISEASE, LUMBAR: ICD-10-CM

## 2024-04-29 DIAGNOSIS — G89.29 ACUTE EXACERBATION OF CHRONIC LOW BACK PAIN: Primary | ICD-10-CM

## 2024-04-29 DIAGNOSIS — G89.29 OTHER CHRONIC PAIN: ICD-10-CM

## 2024-04-29 DIAGNOSIS — M54.50 ACUTE EXACERBATION OF CHRONIC LOW BACK PAIN: Primary | ICD-10-CM

## 2024-04-29 DIAGNOSIS — Z86.79 HISTORY OF HYPERTENSION: ICD-10-CM

## 2024-04-29 DIAGNOSIS — M16.12 OSTEOARTHRITIS OF LEFT HIP, UNSPECIFIED OSTEOARTHRITIS TYPE: ICD-10-CM

## 2024-04-29 DIAGNOSIS — M25.552 CHRONIC LEFT HIP PAIN: ICD-10-CM

## 2024-04-29 PROCEDURE — 72128 CT CHEST SPINE W/O DYE: CPT

## 2024-04-29 PROCEDURE — 99284 EMERGENCY DEPT VISIT MOD MDM: CPT

## 2024-04-29 PROCEDURE — 72192 CT PELVIS W/O DYE: CPT

## 2024-04-29 PROCEDURE — 72131 CT LUMBAR SPINE W/O DYE: CPT

## 2024-04-30 ENCOUNTER — OFFICE VISIT (OUTPATIENT)
Dept: FAMILY MEDICINE CLINIC | Facility: CLINIC | Age: 69
End: 2024-04-30
Payer: MEDICARE

## 2024-04-30 VITALS
OXYGEN SATURATION: 96 % | WEIGHT: 161 LBS | RESPIRATION RATE: 16 BRPM | HEART RATE: 71 BPM | DIASTOLIC BLOOD PRESSURE: 86 MMHG | TEMPERATURE: 98 F | SYSTOLIC BLOOD PRESSURE: 136 MMHG | BODY MASS INDEX: 23.85 KG/M2 | HEIGHT: 69 IN

## 2024-04-30 DIAGNOSIS — M16.0 BILATERAL HIP JOINT ARTHRITIS: Primary | ICD-10-CM

## 2024-04-30 DIAGNOSIS — M54.2 NECK PAIN: ICD-10-CM

## 2024-04-30 PROCEDURE — 99213 OFFICE O/P EST LOW 20 MIN: CPT | Performed by: PHYSICIAN ASSISTANT

## 2024-04-30 PROCEDURE — 1160F RVW MEDS BY RX/DR IN RCRD: CPT | Performed by: PHYSICIAN ASSISTANT

## 2024-04-30 PROCEDURE — 1159F MED LIST DOCD IN RCRD: CPT | Performed by: PHYSICIAN ASSISTANT

## 2024-04-30 RX ORDER — TIZANIDINE 4 MG/1
4 TABLET ORAL 3 TIMES DAILY
Qty: 90 TABLET | Refills: 0 | Status: SHIPPED | OUTPATIENT
Start: 2024-04-30

## 2024-04-30 RX ORDER — LIDOCAINE 4 G/G
2 PATCH TOPICAL
Status: DISCONTINUED | OUTPATIENT
Start: 2024-04-30 | End: 2024-04-30 | Stop reason: HOSPADM

## 2024-04-30 RX ORDER — LIDOCAINE 50 MG/G
1 PATCH TOPICAL EVERY 24 HOURS
Qty: 15 PATCH | Refills: 0 | Status: SHIPPED | OUTPATIENT
Start: 2024-04-30 | End: 2024-05-07

## 2024-04-30 RX ADMIN — LIDOCAINE 2 PATCH: 4 PATCH TOPICAL at 00:51

## 2024-04-30 NOTE — PROGRESS NOTES
Subjective   Stepan Gaines is a 68 y.o. male  Hip Pain (C/o left hip pain that's anywhere from 8-10 on pain scale, this is a chronic issue, was seen at  [facility] last night, they gave him Lidocaine patches which didn't help)      History of Present Illness    Stepan Gaines is a 68-year-old male who presents to the clinic to discuss chronic hip pain. He was in the emergency room last evening. I have reviewed the emergency room notes from [facility] from yesterday, 04/29/2024 in full detail. He is accompanied by his friend.    He was evaluated in the emergency room the previous night due to severe hip pain, which was attributed to arthritis. Despite not having consulted with an orthopedic specialist for potential hip replacements, he has been attempting to maintain his independence. His mobility is limited, limited to approximately 4 blocks to [employer], which exacerbates his hip pain. He is seeking medication to alleviate his discomfort. His current medication regimen includes 800 mg of ibuprofen. He has previously received injections in his neck due to neck issues and a neurotomy, both of which proved ineffective. He has also undergone an arthrotomy on his big toe, 2 shoulder reconstructions, and 2 elbow reconstructions due to his left-handed . He has been dealing with orthopedic issues since he was 68 years old, a condition he attributes to baseball use. He was seen last night due to severe pain, where he was prescribed 2 pain patches. He has previously tried arthritis medications, including Celebrex and diclofenac, but found them ineffective due to gastrointestinal upset. He has not received a cortisone injection. The pain originates in his hip and radiates down to his leg.    He can not drive due to his first seizure on 03/29/2024. He was told by his neurologist  that he could get his 's license back if he goes 90 days without a seizure. He would like a prescription for tizanidine for his  ribs.      The following portions of the patient's history were reviewed and updated as appropriate: allergies, current medications, past social history and problem list    Review of Systems   Constitutional:  Negative for activity change.   Musculoskeletal:  Positive for arthralgias, back pain, gait problem and myalgias. Negative for joint swelling.   Skin: Negative.    Neurological:  Negative for weakness and numbness.       Objective     Vitals:    04/30/24 1404   BP: 136/86   Pulse: 71   Resp: 16   Temp: 98 °F (36.7 °C)   SpO2: 96%       Physical Exam  Vitals and nursing note reviewed.   Constitutional:       General: He is not in acute distress.     Appearance: Normal appearance. He is well-developed. He is not ill-appearing, toxic-appearing or diaphoretic.   Cardiovascular:      Pulses: Normal pulses.   Musculoskeletal:         General: Tenderness (pt c/o pain in hips L>R) present. No swelling, deformity or signs of injury.   Skin:     General: Skin is warm and dry.      Coloration: Skin is not pale.      Findings: No erythema or rash.   Neurological:      Mental Status: He is alert.      Motor: No abnormal muscle tone.      Coordination: Coordination normal.      Gait: Gait abnormal (with a cane for assistance).   Psychiatric:         Mood and Affect: Mood normal.         Behavior: Behavior normal.           Results Review:     Imaging:   Significant arthritis throughout the back and hips. X-ray shows bone-on-bone contact.    Assessment & Plan     Diagnoses and all orders for this visit:    1. Bilateral hip joint arthritis (Primary)  -     Ambulatory Referral to Orthopedic Surgery    2. Neck pain  -     tiZANidine (ZANAFLEX) 4 MG tablet; Take 1 tablet by mouth 3 (Three) Times a Day.  Dispense: 90 tablet; Refill: 0         Chronic hip pain.  His back and hips exhibit significant arthritis throughout, which has been progressively worsening over time.  A referral to an orthopedic specialist for potential hip  injections will be made. A prescription for tizanidine will be sent to his pharmacy. In the interim, he is advised to continue taking ibuprofen and apply ice to the affected area.  I advised patient to be cautious with his gait, advised using a walker due to ongoing hip pain to prevent falls.  I offered a prescription for a walker patient declined stating that he has 1 at home already.        Transcribed from ambient dictation for Nallely Cook PA-C by Ruth Lomax.  04/30/24   14:58 EDT    Patient or patient representative verbalized consent to the visit recording.  I have personally performed the services described in this document as transcribed by the above individual, and it is both accurate and complete.

## 2024-04-30 NOTE — ED PROVIDER NOTES
Subjective   History of Present Illness  This is a 68-year-old male that presents the ER with acute exacerbation of chronic low back pain and left hip pain.  Patient said he had an initial fall 4 years ago when he slipped on ice in [place].  He has had multiple, recurrent falls ever since.  Patient had his most recent fall on 3/31/2024 and was evaluated in our ER and had plain films of the pelvis and left hip as well as lumbar spine, which did not reveal any acute compression fracture or left hip or pelvic fracture.  Patient has been taking Tylenol and ibuprofen as well as Zanaflex.  He says that usually Norco helped the best, either 7.5 mg or 10 mg tablets.  Patient denies any previous left hip surgery.  He continues to have left hip pain that radiates to the left groin and he is currently using crutches to help with ambulation.  He denies any recent fall after his fall on 3/31/2024.  Patient denies any urinary or bowel changes or incontinence.  He also has history of seizure disorder and is on Keppra 750 mg by mouth twice daily and follows with neurology, Dr. Michael Parekh.  Has past medical history of chronic back and hip pain, alcohol use, tobacco dependence, anxiety, COPD, osteoarthritis, PAF, hypertension, and history of melanoma.  Patient lives alone.    History provided by:  Patient  Hip Pain  Location:  Chronic, recurrent left hip pain  Duration: 4 years.  Timing:  Constant  Chronicity:  Chronic  Context:  Pt has had recurrent, chronic left hip pain after multiple falls in the past 4 years. No recent falls. Patient says that he had initial fall 4 yrs ago on ice in [place]. Fall 3/31/24 after seizure with hip/back pain. No acute injuries.  Worsened by:  Ambulation and range of motion to left hip  Ineffective treatments:  Tylenol and Ibuprofen.  Associated symptoms: no abdominal pain, no chest pain, no congestion, no cough, no diarrhea, no fatigue, no fever, no nausea, no rash, no rhinorrhea, no shortness  of breath, no sore throat and no vomiting        Review of Systems   Constitutional: Negative.  Negative for activity change, appetite change, chills, diaphoresis, fatigue and fever.   HENT: Negative.  Negative for congestion, postnasal drip, rhinorrhea, sinus pressure, sinus pain, sneezing and sore throat.    Respiratory: Negative.  Negative for cough and shortness of breath.    Cardiovascular: Negative.  Negative for chest pain and leg swelling.   Gastrointestinal: Negative.  Negative for abdominal pain, constipation, diarrhea, nausea and vomiting.        No bowel incontinence.   Genitourinary: Negative.  Negative for enuresis, flank pain and urgency.        No urinary incontinence   Musculoskeletal:  Positive for arthralgias (left hip and left groin pain), back pain (recurrent upper back and mid-lumbar spinal pain and left hip pain; worsening since fall on 3/31/24.) and gait problem. Negative for joint swelling.   Skin: Negative.  Negative for color change, rash and wound.   Neurological:  Negative for dizziness and numbness (No numbness or tingling to left lower extremity.).        History of seizure disorder; on Keppra 750 mg BID. Pt follows with Dr. Parekh. History of multiple falls with previous head injuries, but no recent fall/head injury, other than 3/31/24.   Psychiatric/Behavioral:          Positive ETOH use.   All other systems reviewed and are negative.      Past Medical History:   Diagnosis Date    A-fib     Anxiety     Arthritis     Cancer     melanoma back    Cataract     COPD (chronic obstructive pulmonary disease)     Fainting     Hypertension        No Known Allergies    Past Surgical History:   Procedure Laterality Date    CARDIAC ELECTROPHYSIOLOGY PROCEDURE N/A 6/10/2020    Procedure: EP/Ablation Atypical AFL w ICE & SJM or Carto mapping, ASAP, pt has no meds that need held;  Surgeon: Beto Solomon DO;  Location: [facility] INVASIVE LOC[facility];  Service: Cardiology;  Laterality: N/A;    CATARACT  EXTRACTION      right eye    ELBOW ARTHROSCOPY Left     left elbow twice    KNEE ARTHROSCOPY Right     SHOULDER ARTHROSCOPY Right     left and right but had left shoulder surgery twice    TOE ARTHROPLASTY Right        Family History   Problem Relation Age of Onset    Hyperlipidemia Mother     Lung cancer Father     Diabetes Father     Kidney failure Father        Social History     Socioeconomic History    Marital status:    Tobacco Use    Smoking status: Every Day     Current packs/day: 0.50     Types: Cigarettes     Passive exposure: Current    Smokeless tobacco: Never   Vaping Use    Vaping status: Never Used   Substance and Sexual Activity    Alcohol use: Not Currently     Comment: 7-10 drinks/week    Drug use: Not Currently     Frequency: 7.0 times per week     Types: Marijuana     Comment: daily    Sexual activity: Defer           Objective   Physical Exam  Vitals and nursing note reviewed.   Constitutional:       General: He is not in acute distress.     Appearance: Normal appearance. He is not ill-appearing, toxic-appearing or diaphoretic.      Comments: No acute sign of pain or distress.  Nontoxic.  Patient smells of alcohol.   HENT:      Head: Normocephalic and atraumatic.      Nose: Nose normal.      Mouth/Throat:      Mouth: Mucous membranes are moist.      Dentition: Dental caries present.      Comments: Oral mucous membranes are moist.  Poor dentition.  Eyes:      Extraocular Movements: Extraocular movements intact.      Conjunctiva/sclera: Conjunctivae normal.      Pupils: Pupils are equal, round, and reactive to light.   Cardiovascular:      Rate and Rhythm: Normal rate and regular rhythm. No extrasystoles are present.     Pulses: Normal pulses.           Dorsalis pedis pulses are 2+ on the right side and 2+ on the left side.        Posterior tibial pulses are 2+ on the right side and 2+ on the left side.      Heart sounds: Normal heart sounds.      Comments: Regular rate and rhythm.  No  ectopy.  No pedal edema to lower extremities.  Positive DP and PT pulses bilaterally  Pulmonary:      Effort: Pulmonary effort is normal.      Breath sounds: Normal breath sounds.      Comments: Lungs are clear to auscultation bilaterally  Abdominal:      General: Bowel sounds are normal. There is no distension.      Palpations: Abdomen is soft.      Tenderness: There is no abdominal tenderness. There is no right CVA tenderness, left CVA tenderness, guarding or rebound.      Comments: Abdomen soft without distention.  Active bowel sounds in all 4 quadrants.  Nontender to palpation.  No flank or CVA tenderness   Musculoskeletal:         General: Normal range of motion.      Cervical back: Normal range of motion and neck supple.      Thoracic back: Tenderness and bony tenderness present. No swelling, edema, deformity, signs of trauma or spasms. Normal range of motion. No scoliosis.      Lumbar back: Tenderness and bony tenderness present. No swelling, edema, deformity, signs of trauma or spasms. Normal range of motion. Negative right straight leg raise test and negative left straight leg raise test. No scoliosis.      Left hip: Tenderness and bony tenderness present. No deformity or crepitus. Normal range of motion. Normal strength.      Right lower leg: No edema.      Left lower leg: No edema.      Comments: Tenderness to palpation to mid thoracic spine as well as superior portion of lumbar spine.  No bruising or sign of trauma.  No soft tissue edema or skin changes.  Tenderness to the left hip.  Full range of motion of the left hip, but pain elicited with external rotation and range of motion.  Painful gait.  Patient also has some tenderness to the left groin.  No other tenderness to bilateral lower extremities.   Skin:     General: Skin is warm and dry.      Findings: No abrasion, bruising, ecchymosis or laceration.   Neurological:      General: No focal deficit present.      Mental Status: He is alert and oriented  "to person, place, and time.      Cranial Nerves: Cranial nerves 2-12 are intact.      Sensory: Sensation is intact.      Motor: Motor function is intact.      Coordination: Coordination is intact.      Comments: Alert and oriented x 3.  Neuro intact and nonfocal   Psychiatric:         Mood and Affect: Mood and affect normal.         Speech: Speech normal.         Behavior: Behavior normal. Behavior is cooperative.         Thought Content: Thought content normal.         Cognition and Memory: Cognition normal.         Judgment: Judgment normal.      Comments: Patient smells of alcohol.  He is friendly and talkative.  No slurred speech.  Normal mood and affect.  Cooperative.         Procedures           ED Course  ED Course as of 04/30/24 0046   Mon Apr 29, 2024   2125 Pt just had MRI brain without contrast on 4/25/24. No acute intracranial abnormalities. [FC]   Tue Apr 30, 2024   0040 Exam reveals localized tenderness to mid thoracic and superior lumbar spine without any recent fall or trauma.  There is no bruising, soft tissue edema.  Patient also has tenderness to the left hip and left groin.  Patient had recent plain films of the lumbar spine and left hip/pelvis from fall on 3/31/2024.  We proceeded with CT imaging of the T and L spine as well as CT of the pelvis without contrast.  Impression on CT of the thoracic and lumbar spine revealed no acute fractures.  Severe right neural foraminal narrowing at L4-L5 and severe left neural foraminal narrowing at L5-S1.  There was also mention in the impression that said \"multiple chronic mild infarcts\".  I contacted radiologist on-call, and he said that that was a typo error, and he would email the radiologist that read the film to have him change that.  He reviewed the study and did not see any acute findings.  CT of the pelvis revealed no acute fracture.  There were degenerative changes on CT of the pelvis, as well.  Reviewed the case and all diagnostic workup with  " "Rolando, ER attending physician.  Patient has not had any recent falls and pain to the left hip and back is chronic in nature.  Patient requested Norco 7.5 mg because he said those \"worked the best\".  I advised patient that I cannot prescribe narcotics as a PA, and we also do not treat chronic pain for the ER setting.  We will give patient follow-up info for neurosurgery, Dr. Greer, for evaluation and treatment plan options.  Patient may also need referral to pain management.  He can continue with Tylenol and ibuprofen as well as Zanaflex.  Also will prescribe Lidoderm 5% patches as directed.  Return to the ER if worsening symptoms. [FC]      ED Course User Index  [FC] Madelin Paz, ANNE MARIE                                         No results found for this or any previous visit (from the past 24 hour(s)).  Note: In addition to lab results from this visit, the labs listed above may include labs taken at another facility or during a different encounter within the last 24 hours. Please correlate lab times with ED admission and discharge times for further clarification of the services performed during this visit.    CT Thoracic Spine Without Contrast   Final Result   No acute fractures. Multiple chronic mild infarcts. Severe right neural foraminal narrowing at L4-L5. Severe left neural foraminal narrowing at L5-S1.         Electronically Signed: Zeferino Hernandez MD     4/29/2024 10:49 PM EDT     Workstation ID: [id number]      CT Lumbar Spine Without Contrast   Final Result   No acute fractures. Multiple chronic mild infarcts. Severe right neural foraminal narrowing at L4-L5. Severe left neural foraminal narrowing at L5-S1.         Electronically Signed: Zeferino Hernandez MD     4/29/2024 10:49 PM EDT     Workstation ID: [id number]      CT Pelvis Without Contrast   Final Result   Degenerative changes. No acute fractures.         Electronically Signed: Zeferino Hernandez MD     4/29/2024 10:24 PM EDT     Workstation ID: [id number]    "     Vitals:    04/29/24 2025 04/29/24 2026   BP: 120/70 126/82   BP Location:  Left arm   Patient Position:  Sitting   Pulse: 80 82   Resp: 18 16   Temp:  98.5 °F (36.9 °C)   TempSrc:  Oral   SpO2: 98% 98%     Medications   Lidocaine 4 % 2 patch (has no administration in time range)     ECG/EMG Results (last 24 hours)       ** No results found for the last 24 hours. **          No orders to display           Medical Decision Making  Amount and/or Complexity of Data Reviewed  Radiology: ordered.    Risk  OTC drugs.        Final diagnoses:   Acute exacerbation of chronic low back pain   Degenerative disc disease, lumbar   Chronic left hip pain   Osteoarthritis of left hip, unspecified osteoarthritis type   Other chronic pain   Tobacco dependence   History of hypertension   History of COPD       ED Disposition  ED Disposition       ED Disposition   Discharge    Condition   Stable    Comment   --               Rajiv Greer MD  [address]  [place] [address]  [phone]    Call in 1 day  Call tomorrow for first available recheck    [facility] EMERGENCY DEPARTMENT  [address] [id number] [phone]    If symptoms worsen    John Argueta PA  [address]  [place] [address]  [phone]    Call in 1 day  Call tomorrow for recheck.  Patient may need pain management referral         Medication List        New Prescriptions      lidocaine 5 %  Commonly known as: Lidoderm  Place 1 patch on the skin as directed by provider Daily. Remove & Discard patch within 12 hours or as directed by MD               Where to Get Your Medications        These medications were sent to [facility] [id number] - [place], [place] - [address] - [phone]  - [phone]   [address] [facility] [address]      Phone: [phone]   lidocaine 5 %            Madelin Paz PA-C  04/30/24 0046

## 2024-04-30 NOTE — DISCHARGE INSTRUCTIONS
ER workup was reassuring.  CT of the thoracic and lumbar spine reveals no evidence of acute compression fracture or significant central spinal canal narrowing.  Patient did have some foraminal narrowing at L4-L5 and L5-S1.  CT of the pelvis revealed arthritic changes but no acute pelvic or hip fracture.  Continue with Tylenol and ibuprofen as needed for pain as well as Zanaflex.  We will prescribe Lidoderm patches every 12 hours as needed for discomfort.  I also discussed patient utilizing a TENS unit.  Recommend neurosurgery follow-up with Dr. Greer.  Call his office tomorrow for evaluation and treatment plan options.  Patient may also need referral from PCP for chronic pain management.  Return to the ER if worsening symptoms.

## 2024-05-07 ENCOUNTER — OFFICE VISIT (OUTPATIENT)
Dept: ORTHOPEDIC SURGERY | Facility: CLINIC | Age: 69
End: 2024-05-07
Payer: MEDICARE

## 2024-05-07 VITALS
WEIGHT: 156.2 LBS | SYSTOLIC BLOOD PRESSURE: 128 MMHG | HEIGHT: 67 IN | BODY MASS INDEX: 24.52 KG/M2 | DIASTOLIC BLOOD PRESSURE: 68 MMHG

## 2024-05-07 DIAGNOSIS — M25.551 BILATERAL HIP PAIN: ICD-10-CM

## 2024-05-07 DIAGNOSIS — M47.816 LUMBAR SPONDYLOSIS: Primary | ICD-10-CM

## 2024-05-07 DIAGNOSIS — M25.552 BILATERAL HIP PAIN: ICD-10-CM

## 2024-05-07 DIAGNOSIS — M54.16 RADICULOPATHY, LUMBAR REGION: ICD-10-CM

## 2024-05-07 PROCEDURE — 99204 OFFICE O/P NEW MOD 45 MIN: CPT | Performed by: ORTHOPAEDIC SURGERY

## 2024-05-07 PROCEDURE — 1159F MED LIST DOCD IN RCRD: CPT | Performed by: ORTHOPAEDIC SURGERY

## 2024-05-07 PROCEDURE — 1160F RVW MEDS BY RX/DR IN RCRD: CPT | Performed by: ORTHOPAEDIC SURGERY

## 2024-05-07 RX ORDER — METHYLPREDNISOLONE 4 MG/1
TABLET ORAL
Qty: 21 TABLET | Refills: 0 | Status: SHIPPED | OUTPATIENT
Start: 2024-05-07

## 2024-05-08 ENCOUNTER — TELEPHONE (OUTPATIENT)
Dept: NEUROLOGY | Facility: CLINIC | Age: 69
End: 2024-05-08
Payer: MEDICARE

## 2024-05-10 ENCOUNTER — TELEPHONE (OUTPATIENT)
Dept: FAMILY MEDICINE CLINIC | Facility: CLINIC | Age: 69
End: 2024-05-10
Payer: MEDICARE

## 2024-05-10 RX ORDER — TRIAMTERENE AND HYDROCHLOROTHIAZIDE 37.5; 25 MG/1; MG/1
1 TABLET ORAL DAILY
Qty: 30 TABLET | Refills: 3 | Status: SHIPPED | OUTPATIENT
Start: 2024-05-10

## 2024-05-10 RX ORDER — TRIAMTERENE AND HYDROCHLOROTHIAZIDE 37.5; 25 MG/1; MG/1
1 TABLET ORAL DAILY
Qty: 30 TABLET | Refills: 3 | Status: CANCELLED | OUTPATIENT
Start: 2024-05-10

## 2024-05-10 NOTE — TELEPHONE ENCOUNTER
"  Caller: Stepan Gaines \"Tomas\"    Relationship: Self    Best call back number: [phone]      What was the call regarding: SAW ORTHO DR THAT WAS REFERRED TO AND THEY SAID IT WAS THE SI JOINT IN BACK WANTED US TO KNOW. WAS TOLD TO FOLLOW UP WITH US ABOUT IT. NOT SURE IF HE NEEDS A REFERRAL SOMEWHERE ELSE OR IF WE CAN TREAT   "

## 2024-05-10 NOTE — TELEPHONE ENCOUNTER
From Dr. Rogel May 7:    I will prescribe him a Medrol Dosepak for his radicular pain.  Any further treatment will need to be addressed by his primary care and/or a spine specialist as his hips are not the source of his current pain.

## 2024-05-14 NOTE — TELEPHONE ENCOUNTER
Message left for patient to see if Medrol Dosepak prescribed by Dr. Rogel helped his symptoms and that Garret could refer him to pain management.

## 2024-05-23 ENCOUNTER — OFFICE VISIT (OUTPATIENT)
Dept: FAMILY MEDICINE CLINIC | Facility: CLINIC | Age: 69
End: 2024-05-23
Payer: MEDICARE

## 2024-05-23 VITALS
DIASTOLIC BLOOD PRESSURE: 72 MMHG | BODY MASS INDEX: 23.99 KG/M2 | WEIGHT: 162 LBS | TEMPERATURE: 97.4 F | SYSTOLIC BLOOD PRESSURE: 114 MMHG | HEART RATE: 65 BPM | OXYGEN SATURATION: 98 % | HEIGHT: 69 IN

## 2024-05-23 DIAGNOSIS — M54.9 CHRONIC BACK PAIN GREATER THAN 3 MONTHS DURATION: ICD-10-CM

## 2024-05-23 DIAGNOSIS — M54.2 NECK PAIN: ICD-10-CM

## 2024-05-23 DIAGNOSIS — M47.816 LUMBAR SPONDYLOSIS: ICD-10-CM

## 2024-05-23 DIAGNOSIS — G89.29 CHRONIC BACK PAIN GREATER THAN 3 MONTHS DURATION: ICD-10-CM

## 2024-05-23 DIAGNOSIS — M25.511 CHRONIC PAIN OF BOTH SHOULDERS: Primary | ICD-10-CM

## 2024-05-23 DIAGNOSIS — M25.512 CHRONIC PAIN OF BOTH SHOULDERS: Primary | ICD-10-CM

## 2024-05-23 DIAGNOSIS — M54.16 RADICULOPATHY, LUMBAR REGION: ICD-10-CM

## 2024-05-23 DIAGNOSIS — G89.29 CHRONIC PAIN OF BOTH SHOULDERS: Primary | ICD-10-CM

## 2024-05-23 PROCEDURE — 99213 OFFICE O/P EST LOW 20 MIN: CPT | Performed by: PHYSICIAN ASSISTANT

## 2024-05-23 PROCEDURE — 1125F AMNT PAIN NOTED PAIN PRSNT: CPT | Performed by: PHYSICIAN ASSISTANT

## 2024-05-23 RX ORDER — METHYLPREDNISOLONE 4 MG/1
TABLET ORAL
Qty: 21 TABLET | Refills: 0 | Status: SHIPPED | OUTPATIENT
Start: 2024-05-23

## 2024-05-23 RX ORDER — TIZANIDINE 4 MG/1
4 TABLET ORAL 3 TIMES DAILY
Qty: 90 TABLET | Refills: 3 | Status: SHIPPED | OUTPATIENT
Start: 2024-05-23

## 2024-05-23 NOTE — PROGRESS NOTES
Subjective   Stepan Gaines is a 68 y.o. male  Back Pain (Follow up on back pain, recently seen by Pebbles Sosa )      History of Present Illness  Stepan Gaines, date of birth 1955, presents to discuss back pain.    He complains of persistent back pain, localized around S1. He attributed the discomfort to his hip, however, his physician suggested that the hip pain was more likely originating from his back. He is not interested in pain management. His primary challenge is transportation, as he is unable to drive due to his history of seizures. He takes Keppra for seizures. He has completed prednisone, which initially provided relief, but the pain has now returned. His mobility is limited, particularly when walking short distances. His sleep is disrupted due to chronic shoulder issues, which he has received multiple surgeries. He is interested in physical therapy for his back and shoulders. He also complains of issues with his upper body. Tizanidine has been beneficial in managing his pain. He has expressed interest in tramadol as an alternative to ibuprofen. He has received facet injections in his neck. his pain radiates down his hips.    The following portions of the patient's history were reviewed and updated as appropriate: allergies, current medications, past social history and problem list    Review of Systems   Constitutional: Negative.    Respiratory: Negative.     Gastrointestinal: Negative.    Genitourinary: Negative.    Musculoskeletal:  Positive for back pain and gait problem. Negative for arthralgias and myalgias.   Neurological:  Positive for seizures. Negative for dizziness, tremors, weakness and numbness.       Objective     Vitals:    05/23/24 1128   BP: 114/72   Pulse: 65   Temp: 97.4 °F (36.3 °C)   SpO2: 98%       Physical Exam  Vitals and nursing note reviewed.   Constitutional:       General: He is not in acute distress.     Appearance: Normal appearance. He is well-developed. He is not  ill-appearing, toxic-appearing or diaphoretic.   Eyes:      Conjunctiva/sclera: Conjunctivae normal.   Cardiovascular:      Rate and Rhythm: Normal rate and regular rhythm.   Pulmonary:      Effort: Pulmonary effort is normal.      Breath sounds: Normal breath sounds.   Musculoskeletal:         General: Tenderness (posterior shoulders and low back) present.   Neurological:      Mental Status: He is alert and oriented to person, place, and time.      Coordination: Coordination normal.      Gait: Gait normal.   Psychiatric:         Attention and Perception: He is attentive.         Mood and Affect: Mood normal.         Behavior: Behavior normal.         Thought Content: Thought content normal.         Judgment: Judgment normal.         Assessment & Plan   1. Back pain.  A referral for physical therapy has been made to address the patient's back and shoulders. A refill of tizanidine has been provided.  Referral to pain management was offered patient declined.  Additionally, an additional steroid pack will be prescribed.    Diagnoses and all orders for this visit:    1. Chronic pain of both shoulders (Primary)  -     Ambulatory Referral to Physical Therapy    2. Chronic back pain greater than 3 months duration  -     Ambulatory Referral to Physical Therapy    3. Neck pain  -     tiZANidine (ZANAFLEX) 4 MG tablet; Take 1 tablet by mouth 3 (Three) Times a Day.  Dispense: 90 tablet; Refill: 3    4. Lumbar spondylosis  -     methylPREDNISolone (MEDROL) 4 MG dose pack; Use as directed by package instructions  Dispense: 21 tablet; Refill: 0    5. Radiculopathy, lumbar region  -     methylPREDNISolone (MEDROL) 4 MG dose pack; Use as directed by package instructions  Dispense: 21 tablet; Refill: 0         Transcribed from ambient dictation for Nallely Cook PA-C by Paige Pritchard.  05/23/24   12:03 EDT    Patient or patient representative verbalized consent to the visit recording.  I have personally performed the services  described in this document as transcribed by the above individual, and it is both accurate and complete.

## 2024-05-24 DIAGNOSIS — G47.00 INSOMNIA, UNSPECIFIED TYPE: ICD-10-CM

## 2024-05-24 DIAGNOSIS — F41.9 ANXIETY: ICD-10-CM

## 2024-05-24 RX ORDER — ALPRAZOLAM 1 MG/1
1 TABLET ORAL NIGHTLY PRN
Qty: 30 TABLET | Refills: 2 | Status: CANCELLED | OUTPATIENT
Start: 2024-05-24

## 2024-05-24 NOTE — TELEPHONE ENCOUNTER
"Caller: Stepan Gaines \"Tomas\"    Relationship: Self    Best call back number: [phone]    Requested Prescriptions:   Requested Prescriptions     Pending Prescriptions Disp Refills    ALPRAZolam (Xanax) 1 MG tablet 30 tablet 2     Sig: Take 1 tablet by mouth At Night As Needed for Anxiety or Sleep.        Pharmacy where request should be sent:    [facility] [phone]  Last office visit with prescribing clinician: 2/26/2024   Last telemedicine visit with prescribing clinician: Visit date not found   Next office visit with prescribing clinician: Visit date not found     Additional details provided by patient: PATIENT IS OUT OF MEDICATION FOR 3 DAYS. PLEASE ADVISE    Does the patient have less than a 3 day supply:  [x] Yes  [] No    Would you like a call back once the refill request has been completed: [x] Yes [] No    If the office needs to give you a call back, can they leave a voicemail: [x] Yes [] No    Paula Cottrell   05/24/24 08:07 EDT       "

## 2024-05-28 NOTE — TELEPHONE ENCOUNTER
PATIENT CALLING AGAIN TO CHECK ON THE STATUS OF THIS REQUEST. PATIENT HAS BEEN OUT FOR A WEEK NOW. HE HAD A SEIZURE SUNDAY NIGHT WHICH IS A SIDE EFFECT OF NOT HAVING HIS MEDICATION

## 2024-05-29 DIAGNOSIS — F41.9 ANXIETY: ICD-10-CM

## 2024-05-29 DIAGNOSIS — G47.00 INSOMNIA, UNSPECIFIED TYPE: ICD-10-CM

## 2024-05-29 RX ORDER — ALPRAZOLAM 1 MG/1
1 TABLET ORAL NIGHTLY PRN
Qty: 30 TABLET | Refills: 2 | Status: SHIPPED | OUTPATIENT
Start: 2024-05-29

## 2024-05-29 NOTE — TELEPHONE ENCOUNTER
Prescription has been refilled and sent to his pharmacy.  He needs to see me in 3 months for further refills.

## 2024-06-18 ENCOUNTER — TELEPHONE (OUTPATIENT)
Dept: FAMILY MEDICINE CLINIC | Facility: CLINIC | Age: 69
End: 2024-06-18

## 2024-06-18 ENCOUNTER — OFFICE VISIT (OUTPATIENT)
Dept: FAMILY MEDICINE CLINIC | Facility: CLINIC | Age: 69
End: 2024-06-18
Payer: MEDICARE

## 2024-06-18 VITALS
TEMPERATURE: 96.6 F | DIASTOLIC BLOOD PRESSURE: 78 MMHG | HEIGHT: 69 IN | OXYGEN SATURATION: 96 % | HEART RATE: 98 BPM | SYSTOLIC BLOOD PRESSURE: 118 MMHG | WEIGHT: 158 LBS | BODY MASS INDEX: 23.4 KG/M2 | RESPIRATION RATE: 16 BRPM

## 2024-06-18 DIAGNOSIS — M47.812 CERVICAL SPONDYLOSIS: ICD-10-CM

## 2024-06-18 DIAGNOSIS — M54.16 RADICULOPATHY, LUMBAR REGION: ICD-10-CM

## 2024-06-18 DIAGNOSIS — M47.816 LUMBAR SPONDYLOSIS: Primary | ICD-10-CM

## 2024-06-18 PROCEDURE — 1160F RVW MEDS BY RX/DR IN RCRD: CPT | Performed by: FAMILY MEDICINE

## 2024-06-18 PROCEDURE — 1125F AMNT PAIN NOTED PAIN PRSNT: CPT | Performed by: FAMILY MEDICINE

## 2024-06-18 PROCEDURE — 1159F MED LIST DOCD IN RCRD: CPT | Performed by: FAMILY MEDICINE

## 2024-06-18 PROCEDURE — 99213 OFFICE O/P EST LOW 20 MIN: CPT | Performed by: FAMILY MEDICINE

## 2024-06-18 RX ORDER — TRAMADOL HYDROCHLORIDE 50 MG/1
50 TABLET ORAL EVERY 8 HOURS PRN
Qty: 60 TABLET | Refills: 2 | Status: SHIPPED | OUTPATIENT
Start: 2024-06-18 | End: 2024-06-18

## 2024-06-18 RX ORDER — METHOCARBAMOL 750 MG/1
TABLET, FILM COATED ORAL
Qty: 60 TABLET | Refills: 2 | Status: SHIPPED | OUTPATIENT
Start: 2024-06-18

## 2024-06-18 NOTE — PROGRESS NOTES
Subjective   Stepan Gaines is a 68 y.o. male    Chief Complaint    Low back pain  Right hip pain  Neck pain    Back Pain  Pertinent negatives include no numbness or weakness.   Patient presents today with persisting pain in his lower back primarily he also has pain in the neck region and right hip area.  He saw Dr. Rogel regarding his right hip and Dr. Rogel felt that the pain in his hip was coming from his low back.  Previous CT scan of the lumbar spine showed significant degenerative disc and neuroforaminal narrowing at 2 different levels, with 1 impingement on the left side and the other impinging on the right side.  He does not have specific radiculopathy symptoms or findings.  Patient feels that his lower back symptoms are worsening and he has been taking more ibuprofen and Tylenol than he typically does but is now having epigastric discomfort along with nausea and reflux that he attributes to the medication.  Most likely this is coming from the ibuprofen.    I recommend to the patient that we get orthopedic spine involved in his workup and treatment as I do not think this is going to get better over time.  He has had tramadol in the past and would like to try that again for his pain.  He does not want to use opioids which is good because we do not want to give them to him.    The following portions of the patient's history were reviewed and updated as appropriate: allergies, current medications, past social history and problem list    Review of Systems   Constitutional: Negative.    Respiratory: Negative.     Cardiovascular: Negative.    Gastrointestinal: Negative.    Genitourinary: Negative.    Musculoskeletal:  Positive for arthralgias, back pain, gait problem and neck pain.   Neurological:  Negative for dizziness, tremors, weakness and numbness.   Psychiatric/Behavioral:  Negative for dysphoric mood. The patient is not nervous/anxious.      Objective     Vitals:    06/18/24 0849   BP: 118/78   Pulse:  98   Resp: 16   Temp: 96.6 °F (35.9 °C)   SpO2: 96%       Physical Exam  Vitals and nursing note reviewed.   Constitutional:       Appearance: Normal appearance. He is well-developed.   HENT:      Head: Normocephalic and atraumatic.   Eyes:      General: No scleral icterus.     Conjunctiva/sclera: Conjunctivae normal.      Pupils: Pupils are equal, round, and reactive to light.   Cardiovascular:      Rate and Rhythm: Normal rate and regular rhythm.   Pulmonary:      Effort: Pulmonary effort is normal.      Breath sounds: Normal breath sounds.   Abdominal:      General: Bowel sounds are normal.      Palpations: Abdomen is soft.   Musculoskeletal:      Cervical back: Tenderness and bony tenderness present. No deformity or erythema. Pain with movement present. Decreased range of motion.      Lumbar back: Tenderness and bony tenderness present. No swelling, deformity or spasms. Decreased range of motion. Positive right straight leg raise test and positive left straight leg raise test.   Neurological:      Mental Status: He is alert and oriented to person, place, and time.      Deep Tendon Reflexes: Reflexes are normal and symmetric.   Psychiatric:         Mood and Affect: Mood normal.         Behavior: Behavior normal.     Assessment & Plan   Problems Addressed this Visit    None  Visit Diagnoses       Lumbar spondylosis    -  Primary    Relevant Medications    traMADol (ULTRAM) 50 MG tablet    Other Relevant Orders    Ambulatory Referral to Orthopedic Surgery (Completed)    Radiculopathy, lumbar region        Relevant Orders    Ambulatory Referral to Orthopedic Surgery (Completed)          Diagnoses         Codes Comments    Lumbar spondylosis    -  Primary ICD-10-CM: M47.816  ICD-9-CM: 721.3     Radiculopathy, lumbar region     ICD-10-CM: M54.16  ICD-9-CM: 724.4           Plan    Initial prescription for tramadol was not filled as his insurance company would not cover it.  I switched to Robaxin.    Ortho spine  referral.    I spent 25 minutes in patient care: Reviewing records prior to the visit, examining the patient, entering orders and documentation    Part of this note may be an electronic transcription/translation of spoken language to printed text using the Dragon Dictation System.

## 2024-06-18 NOTE — TELEPHONE ENCOUNTER
"  Caller: Stepan Gaines \"Tomas\"    Relationship: Self    Best call back number: [phone]      What was the call regarding: PATIENT CALLED ASKING FOR SOMETHING DIFFERENT OTHER THAN THE TRAMADOL.  PATIENT STATED THAT THE INSURANCE WILL NOT COVER THIS MEDICATION.  PATIENT WOULD OUT OF POCKET. PATIENT STATED THAT THE PHARMACY SENT OVER A REQUEST.   "

## 2024-06-26 ENCOUNTER — HOSPITAL ENCOUNTER (OUTPATIENT)
Dept: NEUROLOGY | Facility: HOSPITAL | Age: 69
Discharge: HOME | End: 2024-06-26
Admitting: PSYCHIATRY & NEUROLOGY
Payer: MEDICARE

## 2024-06-26 DIAGNOSIS — G40.909 NONINTRACTABLE EPILEPSY WITHOUT STATUS EPILEPTICUS, UNSPECIFIED EPILEPSY TYPE: ICD-10-CM

## 2024-06-26 PROCEDURE — 95713 VEEG 2-12 HR CONT MNTR: CPT

## 2024-06-26 PROCEDURE — 95718 EEG PHYS/QHP 2-12 HR W/VEEG: CPT | Performed by: PSYCHIATRY & NEUROLOGY

## 2024-06-26 RX ORDER — TOBRAMYCIN AND DEXAMETHASONE 3; 1 MG/ML; MG/ML
SUSPENSION/ DROPS OPHTHALMIC
Qty: 5 ML | Refills: 1 | Status: SHIPPED | OUTPATIENT
Start: 2024-06-26

## 2024-06-27 ENCOUNTER — TELEPHONE (OUTPATIENT)
Dept: NEUROLOGY | Facility: CLINIC | Age: 69
End: 2024-06-27
Payer: MEDICARE

## 2024-06-27 DIAGNOSIS — G40.909 NONINTRACTABLE EPILEPSY WITHOUT STATUS EPILEPTICUS, UNSPECIFIED EPILEPSY TYPE: Primary | ICD-10-CM

## 2024-06-27 NOTE — TELEPHONE ENCOUNTER
Notified Tomas of new medication.  He was in good understanding and will let us know if any issues of picking up meds.

## 2024-06-27 NOTE — TELEPHONE ENCOUNTER
"Notified patient per Dr. Parekh that \"his EEG did not show any seizure activity. However he should continue taking his seizure medications. He can call me if he has any more episodes\"   He was in good understanding.  However, he stated that he would like to try a different medication if possible as he stated that the higher dose makes him \"feel stupid\" and he is not comfortable with the s/e.  "

## 2024-07-11 ENCOUNTER — OFFICE VISIT (OUTPATIENT)
Dept: NEUROLOGY | Facility: CLINIC | Age: 69
End: 2024-07-11
Payer: MEDICARE

## 2024-07-11 VITALS
SYSTOLIC BLOOD PRESSURE: 120 MMHG | OXYGEN SATURATION: 97 % | BODY MASS INDEX: 23.19 KG/M2 | WEIGHT: 156.6 LBS | DIASTOLIC BLOOD PRESSURE: 64 MMHG | HEIGHT: 69 IN | HEART RATE: 72 BPM

## 2024-07-11 DIAGNOSIS — G40.909 NONINTRACTABLE EPILEPSY WITHOUT STATUS EPILEPTICUS, UNSPECIFIED EPILEPSY TYPE: Primary | ICD-10-CM

## 2024-07-11 PROCEDURE — 1160F RVW MEDS BY RX/DR IN RCRD: CPT | Performed by: PSYCHIATRY & NEUROLOGY

## 2024-07-11 PROCEDURE — 1159F MED LIST DOCD IN RCRD: CPT | Performed by: PSYCHIATRY & NEUROLOGY

## 2024-07-11 PROCEDURE — 99214 OFFICE O/P EST MOD 30 MIN: CPT | Performed by: PSYCHIATRY & NEUROLOGY
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
## 2024-07-27 ENCOUNTER — HOSPITAL ENCOUNTER (EMERGENCY)
Facility: HOSPITAL | Age: 69
Discharge: HOME | End: 2024-07-27
Attending: EMERGENCY MEDICINE
Payer: MEDICARE

## 2024-07-27 VITALS
SYSTOLIC BLOOD PRESSURE: 110 MMHG | RESPIRATION RATE: 18 BRPM | HEIGHT: 69 IN | BODY MASS INDEX: 22.36 KG/M2 | DIASTOLIC BLOOD PRESSURE: 88 MMHG | TEMPERATURE: 97.5 F | OXYGEN SATURATION: 96 % | WEIGHT: 151 LBS | HEART RATE: 76 BPM

## 2024-07-27 DIAGNOSIS — M54.30 ACUTE SCIATICA: Primary | ICD-10-CM

## 2024-07-27 PROCEDURE — 99283 EMERGENCY DEPT VISIT LOW MDM: CPT

## 2024-07-27 RX ORDER — CYCLOBENZAPRINE HCL 10 MG
10 TABLET ORAL 3 TIMES DAILY PRN
Qty: 12 TABLET | Refills: 0 | Status: SHIPPED | OUTPATIENT
Start: 2024-07-27

## 2024-07-27 RX ORDER — OXYCODONE AND ACETAMINOPHEN 10; 325 MG/1; MG/1
1 TABLET ORAL ONCE
Status: COMPLETED | OUTPATIENT
Start: 2024-07-27 | End: 2024-07-27

## 2024-07-27 RX ORDER — METHYLPREDNISOLONE 4 MG/1
TABLET ORAL
Qty: 21 TABLET | Refills: 0 | Status: SHIPPED | OUTPATIENT
Start: 2024-07-27

## 2024-07-27 RX ADMIN — OXYCODONE HYDROCHLORIDE AND ACETAMINOPHEN 1 TABLET: 10; 325 TABLET ORAL at 18:11

## 2024-07-27 NOTE — ED PROVIDER NOTES
Subjective   History of Present Illness  68-year-old complaining of exacerbation of his chronic back pain.  No new falls no new trauma.  Patient denies any loss of bowel or bowel control.  He has no saddle anesthesia.  The pain is like it always is just seems a bit worse.  He has an upcoming appointment with his primary care doctor early this week.    History provided by:  Patient   used: No    Back Pain  Location:  Lumbar spine  Quality:  Stiffness  Stiffness is present:  In the morning  Radiates to:  Does not radiate  Pain severity:  Severe  Onset quality:  Gradual  Duration:  12 months  Timing:  Constant  Progression:  Worsening  Chronicity:  Chronic  Context: not emotional stress, not jumping from heights, not lifting heavy objects, not occupational injury, not pedestrian accident, not recent illness and not recent injury    Relieved by:  Narcotics  Worsened by:  Bending and twisting  Ineffective treatments:  None tried  Associated symptoms: no bladder incontinence, no bowel incontinence, no chest pain, no leg pain, no numbness, no perianal numbness, no weakness and no weight loss    Risk factors: no hx of osteoporosis, not obese and no steroid use        Review of Systems   Constitutional:  Negative for weight loss.   Respiratory:  Negative for chest tightness, shortness of breath and wheezing.    Cardiovascular:  Negative for chest pain and palpitations.   Gastrointestinal:  Negative for bowel incontinence.   Genitourinary:  Negative for bladder incontinence.   Musculoskeletal:  Positive for back pain.   Neurological:  Negative for weakness and numbness.   Psychiatric/Behavioral: Negative.         Past Medical History:   Diagnosis Date    A-fib     Anxiety     Arthritis     Cancer     melanoma back    Cataract     COPD (chronic obstructive pulmonary disease)     Fainting     Hypertension        Allergies   Allergen Reactions    Diclofenac GI Intolerance       Past Surgical History:    Procedure Laterality Date    CARDIAC ELECTROPHYSIOLOGY PROCEDURE N/A 6/10/2020    Procedure: EP/Ablation Atypical AFL w ICE & SJM or Carto mapping, ASAP, pt has no meds that need held;  Surgeon: Beto Solomon DO;  Location: [facility] EP INVASIVE LOCATION;  Service: Cardiology;  Laterality: N/A;    CATARACT EXTRACTION      right eye    ELBOW ARTHROSCOPY Left     left elbow twice    KNEE ARTHROSCOPY Right     SHOULDER ARTHROSCOPY Right     left and right but had left shoulder surgery twice    TOE ARTHROPLASTY Right        Family History   Problem Relation Age of Onset    Hyperlipidemia Mother     Lung cancer Father     Diabetes Father     Kidney failure Father        Social History     Socioeconomic History    Marital status:    Tobacco Use    Smoking status: Every Day     Current packs/day: 0.50     Types: Cigarettes     Passive exposure: Current    Smokeless tobacco: Never   Vaping Use    Vaping status: Never Used   Substance and Sexual Activity    Alcohol use: Not Currently     Comment: 7-10 drinks/week    Drug use: Not Currently     Frequency: 7.0 times per week     Types: Marijuana     Comment: daily    Sexual activity: Defer           Objective   Physical Exam  Vitals and nursing note reviewed.   Constitutional:       Appearance: He is well-developed.      Comments: Warm pink dry afebrile nontoxic dilatory under his own power   HENT:      Head: Normocephalic and atraumatic.      Right Ear: External ear normal.      Left Ear: External ear normal.      Nose: Nose normal.   Eyes:      General: No scleral icterus.     Conjunctiva/sclera: Conjunctivae normal.      Pupils: Pupils are equal, round, and reactive to light.   Neck:      Thyroid: No thyromegaly.   Cardiovascular:      Rate and Rhythm: Normal rate and regular rhythm.      Heart sounds: Normal heart sounds.   Pulmonary:      Effort: Pulmonary effort is normal. No respiratory distress.      Breath sounds: Normal breath sounds. No wheezing or rales.  "  Chest:      Chest wall: No tenderness.   Abdominal:      General: Bowel sounds are normal. There is no distension.      Palpations: Abdomen is soft.      Tenderness: There is no abdominal tenderness.   Musculoskeletal:         General: Normal range of motion.      Cervical back: Normal range of motion.      Comments: Diffuse tenderness of the lumbar spine no step-off no crepitus.   Lymphadenopathy:      Cervical: No cervical adenopathy.   Skin:     General: Skin is warm and dry.   Neurological:      Mental Status: He is alert and oriented to person, place, and time.      Cranial Nerves: No cranial nerve deficit.      Coordination: Coordination normal.      Deep Tendon Reflexes: Reflexes are normal and symmetric. Reflexes normal.   Psychiatric:         Behavior: Behavior normal.         Thought Content: Thought content normal.         Judgment: Judgment normal.         Procedures           ED Course                                 No results found for this or any previous visit (from the past 24 hour(s)).  Note: In addition to lab results from this visit, the labs listed above may include labs taken at another facility or during a different encounter within the last 24 hours. Please correlate lab times with ED admission and discharge times for further clarification of the services performed during this visit.    No orders to display     Vitals:    07/27/24 1630   BP: 110/88   BP Location: Right arm   Patient Position: Sitting   Pulse: 76   Resp: 18   Temp: 97.5 °F (36.4 °C)   TempSrc: Oral   SpO2: 96%   Weight: 68.5 kg (151 lb)   Height: 175.3 cm (69\")     Medications   oxyCODONE-acetaminophen (PERCOCET)  MG per tablet 1 tablet (1 tablet Oral Given 7/27/24 1811)     ECG/EMG Results (last 24 hours)       ** No results found for the last 24 hours. **          No orders to display                   Medical Decision Making  Risk  Prescription drug management.        Final diagnoses:   None       ED " Disposition  ED Disposition       ED Disposition   Discharge    Condition   Stable    Comment   --               John Argueta PA  [address] [facility]  [address]  [place] [id number] [phone]      Call for appointment         Medication List        New Prescriptions      cyclobenzaprine 10 MG tablet  Commonly known as: FLEXERIL  Take 1 tablet by mouth 3 (Three) Times a Day As Needed for Muscle Spasms.     methylPREDNISolone 4 MG dose pack  Commonly known as: MEDROL  Take as directed on package instructions.               Where to Get Your Medications        These medications were sent to [facility] [id number] - [place], [place] - [address] [facility] - [phone]  - [phone]   [address] [facility] [address]      Phone: [phone]   cyclobenzaprine 10 MG tablet  methylPREDNISolone 4 MG dose pack            Tanvir Rios PA  07/28/24 8470

## 2024-08-05 RX ORDER — TOBRAMYCIN AND DEXAMETHASONE 3; 1 MG/ML; MG/ML
SUSPENSION/ DROPS OPHTHALMIC
Qty: 5 ML | Refills: 1 | Status: SHIPPED | OUTPATIENT
Start: 2024-08-05

## 2024-08-08 ENCOUNTER — OFFICE VISIT (OUTPATIENT)
Dept: FAMILY MEDICINE CLINIC | Facility: CLINIC | Age: 69
End: 2024-08-08
Payer: MEDICARE

## 2024-08-08 VITALS
DIASTOLIC BLOOD PRESSURE: 70 MMHG | BODY MASS INDEX: 22.74 KG/M2 | HEART RATE: 68 BPM | WEIGHT: 153.5 LBS | OXYGEN SATURATION: 93 % | RESPIRATION RATE: 16 BRPM | HEIGHT: 69 IN | SYSTOLIC BLOOD PRESSURE: 110 MMHG | TEMPERATURE: 98 F

## 2024-08-08 DIAGNOSIS — G47.00 INSOMNIA, UNSPECIFIED TYPE: ICD-10-CM

## 2024-08-08 DIAGNOSIS — M47.816 LUMBAR SPONDYLOSIS: Primary | ICD-10-CM

## 2024-08-08 DIAGNOSIS — M54.16 RADICULOPATHY, LUMBAR REGION: ICD-10-CM

## 2024-08-08 DIAGNOSIS — F41.9 ANXIETY: ICD-10-CM

## 2024-08-08 PROCEDURE — 1160F RVW MEDS BY RX/DR IN RCRD: CPT | Performed by: FAMILY MEDICINE

## 2024-08-08 PROCEDURE — 99213 OFFICE O/P EST LOW 20 MIN: CPT | Performed by: FAMILY MEDICINE

## 2024-08-08 PROCEDURE — 1159F MED LIST DOCD IN RCRD: CPT | Performed by: FAMILY MEDICINE

## 2024-08-08 PROCEDURE — 1125F AMNT PAIN NOTED PAIN PRSNT: CPT | Performed by: FAMILY MEDICINE

## 2024-08-08 RX ORDER — METHOCARBAMOL 750 MG/1
750 TABLET, FILM COATED ORAL 4 TIMES DAILY PRN
COMMUNITY
End: 2024-08-08 | Stop reason: SDUPTHER

## 2024-08-08 RX ORDER — METHOCARBAMOL 750 MG/1
750 TABLET, FILM COATED ORAL 3 TIMES DAILY
Qty: 90 TABLET | Refills: 5 | Status: SHIPPED | OUTPATIENT
Start: 2024-08-08

## 2024-08-08 RX ORDER — TRAMADOL HYDROCHLORIDE 50 MG/1
50 TABLET ORAL EVERY 8 HOURS PRN
Qty: 90 TABLET | Refills: 5 | Status: SHIPPED | OUTPATIENT
Start: 2024-08-08

## 2024-08-08 RX ORDER — TRAMADOL HYDROCHLORIDE 50 MG/1
50 TABLET ORAL EVERY 6 HOURS PRN
COMMUNITY
End: 2024-08-08 | Stop reason: SDUPTHER

## 2024-08-08 RX ORDER — ALPRAZOLAM 1 MG/1
1 TABLET ORAL NIGHTLY PRN
Qty: 30 TABLET | Refills: 5 | Status: SHIPPED | OUTPATIENT
Start: 2024-08-08

## 2024-08-08 NOTE — PROGRESS NOTES
Subjective   Stepan Gaines is a 68 y.o. male    Chief Complaint    Anxiety  Insomnia  Chronic back pain  Seizure disorder    HPI  History of Present Illness  The patient is a 68-year-old male who presents for follow-up of anxiety, needs a refill of his alprazolam, and ongoing chronic back pain, requesting refill of Robaxin and tramadol. He is inquiring about possibility of seeing a spine specialist.  Previously referred to Dr. Tanvir Ellis but patient canceled his appointment.    He is experiencing severe back pain that radiates down both lower extremities. His mobility is compromised due to the pain, causing him to feel unstable and unbalanced. His feet are also causing him pain. He has to adjust his walking routine due to his back pain. Tramadol provides some relief and does not upset his stomach as much as ibuprofen did. He cancelled his orthopedic appointment due to personal commitments. He has received neck injections in the past.  Patient plans to reschedule with Dr. Ellis.    Supplemental Information  He was switched to Briviact for seizures by his neurologist.      The following portions of the patient's history were reviewed and updated as appropriate: allergies, current medications, past social history and problem list    Review of Systems   Constitutional: Negative.  Negative for appetite change and fatigue.   Respiratory: Negative.  Negative for chest tightness and shortness of breath.    Cardiovascular:  Negative for chest pain, palpitations and leg swelling.   Gastrointestinal: Negative.  Negative for abdominal pain, diarrhea and nausea.   Musculoskeletal:  Positive for arthralgias, back pain, gait problem, neck pain and neck stiffness. Negative for myalgias.   Neurological:  Negative for dizziness, tremors, weakness, light-headedness, numbness and headaches.   Psychiatric/Behavioral:  Positive for dysphoric mood and sleep disturbance. Negative for agitation, behavioral problems, confusion, decreased  concentration and suicidal ideas. The patient is nervous/anxious.        Objective     Vitals:    08/08/24 0944   BP: 110/70   Pulse: 68   Resp: 16   Temp: 98 °F (36.7 °C)   SpO2: 93%       Physical Exam  Vitals and nursing note reviewed.   Constitutional:       General: He is not in acute distress.     Appearance: Normal appearance. He is well-developed. He is not ill-appearing, toxic-appearing or diaphoretic.   HENT:      Head: Normocephalic and atraumatic.   Eyes:      General: No scleral icterus.     Conjunctiva/sclera: Conjunctivae normal.      Pupils: Pupils are equal, round, and reactive to light.   Cardiovascular:      Rate and Rhythm: Normal rate and regular rhythm.   Pulmonary:      Effort: Pulmonary effort is normal.      Breath sounds: Normal breath sounds.   Abdominal:      General: Bowel sounds are normal.      Palpations: Abdomen is soft.   Musculoskeletal:      Lumbar back: Tenderness and bony tenderness present. No swelling, deformity or spasms. Decreased range of motion.      Right lower leg: No edema.      Left lower leg: No edema.   Neurological:      General: No focal deficit present.      Mental Status: He is alert and oriented to person, place, and time.      Motor: No weakness.      Coordination: Coordination normal.      Gait: Gait abnormal.      Deep Tendon Reflexes: Reflexes are normal and symmetric.   Psychiatric:         Attention and Perception: He is attentive.         Mood and Affect: Mood normal.         Behavior: Behavior normal.         Thought Content: Thought content normal.         Judgment: Judgment normal.       Physical Exam      Assessment & Plan   Assessment & Plan  1. Anxiety.  Prescriptions for Xanax, tramadol, and Robaxin have been renewed.    2. Chronic back pain.  A referral to a spine specialist has been arranged. He has been advised to take Robaxin and tramadol every 8 hours.    Problems Addressed this Visit    None  Visit Diagnoses       Lumbar spondylosis    -   Primary    Relevant Medications    traMADol (ULTRAM) 50 MG tablet    Insomnia, unspecified type        Relevant Medications    ALPRAZolam (Xanax) 1 MG tablet    Anxiety        Relevant Medications    ALPRAZolam (Xanax) 1 MG tablet    Radiculopathy, lumbar region        Relevant Medications    traMADol (ULTRAM) 50 MG tablet          Diagnoses         Codes Comments    Lumbar spondylosis    -  Primary ICD-10-CM: M47.816  ICD-9-CM: 721.3     Insomnia, unspecified type     ICD-10-CM: G47.00  ICD-9-CM: 780.52     Anxiety     ICD-10-CM: F41.9  ICD-9-CM: 300.00     Radiculopathy, lumbar region     ICD-10-CM: M54.16  ICD-9-CM: 724.4           I spent 20 minutes in patient care: Reviewing records prior to the visit, examining the patient, entering orders and documentation    Part of this note may be an electronic transcription/translation of spoken language to printed text using the Dragon Dictation System.

## 2024-08-26 RX ORDER — TRIAMTERENE/HYDROCHLOROTHIAZID 37.5-25 MG
1 TABLET ORAL DAILY
Qty: 90 TABLET | Refills: 1 | Status: SHIPPED | OUTPATIENT
Start: 2024-08-26

## 2024-09-11 ENCOUNTER — OFFICE VISIT (OUTPATIENT)
Dept: FAMILY MEDICINE CLINIC | Facility: CLINIC | Age: 69
End: 2024-09-11
Payer: MEDICARE

## 2024-09-11 VITALS
OXYGEN SATURATION: 99 % | SYSTOLIC BLOOD PRESSURE: 118 MMHG | TEMPERATURE: 97.2 F | DIASTOLIC BLOOD PRESSURE: 78 MMHG | WEIGHT: 153.5 LBS | BODY MASS INDEX: 22.74 KG/M2 | HEIGHT: 69 IN | RESPIRATION RATE: 16 BRPM | HEART RATE: 80 BPM

## 2024-09-11 DIAGNOSIS — M54.16 RADICULOPATHY, LUMBAR REGION: ICD-10-CM

## 2024-09-11 DIAGNOSIS — M47.816 LUMBAR SPONDYLOSIS: Primary | ICD-10-CM

## 2024-09-11 DIAGNOSIS — B07.0 PLANTAR WART OF RIGHT FOOT: ICD-10-CM

## 2024-09-11 PROCEDURE — 99213 OFFICE O/P EST LOW 20 MIN: CPT | Performed by: FAMILY MEDICINE

## 2024-09-11 PROCEDURE — 1125F AMNT PAIN NOTED PAIN PRSNT: CPT | Performed by: FAMILY MEDICINE

## 2024-09-11 PROCEDURE — 17110 DESTRUCTION B9 LES UP TO 14: CPT | Performed by: FAMILY MEDICINE

## 2024-09-11 PROCEDURE — 1160F RVW MEDS BY RX/DR IN RCRD: CPT | Performed by: FAMILY MEDICINE

## 2024-09-11 PROCEDURE — 1159F MED LIST DOCD IN RCRD: CPT | Performed by: FAMILY MEDICINE

## 2024-09-11 RX ORDER — HYDROCODONE BITARTRATE AND ACETAMINOPHEN 7.5; 325 MG/1; MG/1
1 TABLET ORAL EVERY 8 HOURS PRN
Qty: 15 TABLET | Refills: 0 | Status: SHIPPED | OUTPATIENT
Start: 2024-09-11

## 2024-09-17 RX ORDER — METOPROLOL TARTRATE 50 MG
50 TABLET ORAL 2 TIMES DAILY
Qty: 180 TABLET | Refills: 1 | Status: SHIPPED | OUTPATIENT
Start: 2024-09-17

## 2024-09-20 ENCOUNTER — HOSPITAL ENCOUNTER (EMERGENCY)
Facility: HOSPITAL | Age: 69
Discharge: HOME | End: 2024-09-20
Attending: EMERGENCY MEDICINE
Payer: MEDICARE

## 2024-09-20 VITALS
HEART RATE: 87 BPM | DIASTOLIC BLOOD PRESSURE: 75 MMHG | SYSTOLIC BLOOD PRESSURE: 106 MMHG | OXYGEN SATURATION: 93 % | RESPIRATION RATE: 18 BRPM | TEMPERATURE: 97.7 F | WEIGHT: 152 LBS | HEIGHT: 69 IN | BODY MASS INDEX: 22.51 KG/M2

## 2024-09-20 DIAGNOSIS — M54.42 ACUTE BILATERAL LOW BACK PAIN WITH LEFT-SIDED SCIATICA: Primary | ICD-10-CM

## 2024-09-20 PROCEDURE — 99283 EMERGENCY DEPT VISIT LOW MDM: CPT

## 2024-09-20 PROCEDURE — 25010000002 KETOROLAC TROMETHAMINE PER 15 MG: Performed by: EMERGENCY MEDICINE

## 2024-09-20 PROCEDURE — 96372 THER/PROPH/DIAG INJ SC/IM: CPT

## 2024-09-20 RX ORDER — LIDOCAINE 4 G/G
1 PATCH TOPICAL ONCE
Status: DISCONTINUED | OUTPATIENT
Start: 2024-09-20 | End: 2024-09-21 | Stop reason: HOSPADM

## 2024-09-20 RX ORDER — OXYCODONE HYDROCHLORIDE 5 MG/1
5 TABLET ORAL ONCE
Status: COMPLETED | OUTPATIENT
Start: 2024-09-20 | End: 2024-09-20

## 2024-09-20 RX ORDER — ACETAMINOPHEN 500 MG
1000 TABLET ORAL ONCE
Status: COMPLETED | OUTPATIENT
Start: 2024-09-20 | End: 2024-09-20

## 2024-09-20 RX ORDER — KETOROLAC TROMETHAMINE 10 MG/1
10 TABLET, FILM COATED ORAL EVERY 6 HOURS PRN
Qty: 20 TABLET | Refills: 0 | Status: SHIPPED | OUTPATIENT
Start: 2024-09-20 | End: 2024-09-25

## 2024-09-20 RX ORDER — KETOROLAC TROMETHAMINE 30 MG/ML
30 INJECTION, SOLUTION INTRAMUSCULAR; INTRAVENOUS ONCE
Status: COMPLETED | OUTPATIENT
Start: 2024-09-20 | End: 2024-09-20

## 2024-09-20 RX ORDER — LIDOCAINE 50 MG/G
1 PATCH TOPICAL EVERY 24 HOURS
Qty: 7 PATCH | Refills: 0 | Status: SHIPPED | OUTPATIENT
Start: 2024-09-20 | End: 2024-09-27

## 2024-09-20 RX ADMIN — LIDOCAINE 1 PATCH: 4 PATCH TOPICAL at 23:18

## 2024-09-20 RX ADMIN — OXYCODONE HYDROCHLORIDE 5 MG: 5 TABLET ORAL at 23:06

## 2024-09-20 RX ADMIN — ACETAMINOPHEN 1000 MG: 500 TABLET ORAL at 23:05

## 2024-09-20 RX ADMIN — KETOROLAC TROMETHAMINE 30 MG: 30 INJECTION, SOLUTION INTRAMUSCULAR; INTRAVENOUS at 23:04

## 2024-09-22 ENCOUNTER — APPOINTMENT (OUTPATIENT)
Dept: GENERAL RADIOLOGY | Facility: HOSPITAL | Age: 69
End: 2024-09-22
Payer: MEDICARE

## 2024-09-22 ENCOUNTER — HOSPITAL ENCOUNTER (EMERGENCY)
Facility: HOSPITAL | Age: 69
Discharge: HOME | End: 2024-09-22
Attending: EMERGENCY MEDICINE | Admitting: EMERGENCY MEDICINE
Payer: MEDICARE

## 2024-09-22 VITALS
RESPIRATION RATE: 20 BRPM | TEMPERATURE: 97.6 F | OXYGEN SATURATION: 95 % | WEIGHT: 152 LBS | HEIGHT: 69 IN | BODY MASS INDEX: 22.51 KG/M2 | SYSTOLIC BLOOD PRESSURE: 125 MMHG | DIASTOLIC BLOOD PRESSURE: 77 MMHG | HEART RATE: 88 BPM

## 2024-09-22 DIAGNOSIS — S63.501A SPRAIN OF RIGHT WRIST, INITIAL ENCOUNTER: Primary | ICD-10-CM

## 2024-09-22 PROCEDURE — 99283 EMERGENCY DEPT VISIT LOW MDM: CPT

## 2024-09-22 PROCEDURE — 73110 X-RAY EXAM OF WRIST: CPT

## 2024-09-22 RX ORDER — ACETAMINOPHEN 500 MG
1000 TABLET ORAL ONCE
Status: COMPLETED | OUTPATIENT
Start: 2024-09-22 | End: 2024-09-22

## 2024-09-22 RX ADMIN — ACETAMINOPHEN 1000 MG: 500 TABLET, FILM COATED ORAL at 21:26

## 2024-09-25 DIAGNOSIS — F41.9 ANXIETY: ICD-10-CM

## 2024-09-26 ENCOUNTER — HOSPITAL ENCOUNTER (EMERGENCY)
Facility: HOSPITAL | Age: 69
Discharge: HOME | End: 2024-09-27
Attending: EMERGENCY MEDICINE
Payer: MEDICARE

## 2024-09-26 DIAGNOSIS — F32.A DEPRESSION, UNSPECIFIED DEPRESSION TYPE: ICD-10-CM

## 2024-09-26 DIAGNOSIS — F10.10 ALCOHOL ABUSE: Primary | ICD-10-CM

## 2024-09-26 DIAGNOSIS — F41.9 ANXIETY: ICD-10-CM

## 2024-09-26 DIAGNOSIS — F19.90 SUBSTANCE USE: ICD-10-CM

## 2024-09-26 LAB
ALBUMIN SERPL-MCNC: 3.9 G/DL (ref 3.5–5.2)
ALBUMIN/GLOB SERPL: 1.3 G/DL
ALP SERPL-CCNC: 103 U/L (ref 39–117)
ALT SERPL W P-5'-P-CCNC: 28 U/L (ref 1–41)
ANION GAP SERPL CALCULATED.3IONS-SCNC: 14 MMOL/L (ref 5–15)
APAP SERPL-MCNC: <5 MCG/ML (ref 0–30)
AST SERPL-CCNC: 41 U/L (ref 1–40)
BASOPHILS # BLD AUTO: 0.05 10*3/MM3 (ref 0–0.2)
BASOPHILS NFR BLD AUTO: 0.8 % (ref 0–1.5)
BILIRUB SERPL-MCNC: 0.2 MG/DL (ref 0–1.2)
BUN SERPL-MCNC: 4 MG/DL (ref 8–23)
BUN/CREAT SERPL: 6.2 (ref 7–25)
CALCIUM SPEC-SCNC: 8.6 MG/DL (ref 8.6–10.5)
CHLORIDE SERPL-SCNC: 100 MMOL/L (ref 98–107)
CO2 SERPL-SCNC: 22 MMOL/L (ref 22–29)
CREAT SERPL-MCNC: 0.65 MG/DL (ref 0.76–1.27)
D-LACTATE SERPL-SCNC: 2.6 MMOL/L (ref 0.5–2)
DEPRECATED RDW RBC AUTO: 42.8 FL (ref 37–54)
EGFRCR SERPLBLD CKD-EPI 2021: 103.3 ML/MIN/1.73
EOSINOPHIL # BLD AUTO: 0.13 10*3/MM3 (ref 0–0.4)
EOSINOPHIL NFR BLD AUTO: 2 % (ref 0.3–6.2)
ERYTHROCYTE [DISTWIDTH] IN BLOOD BY AUTOMATED COUNT: 12.3 % (ref 12.3–15.4)
ETHANOL BLD-MCNC: 197 MG/DL (ref 0–10)
GLOBULIN UR ELPH-MCNC: 3.1 GM/DL
GLUCOSE SERPL-MCNC: 109 MG/DL (ref 65–99)
HCT VFR BLD AUTO: 43.6 % (ref 37.5–51)
HGB BLD-MCNC: 15.1 G/DL (ref 13–17.7)
IMM GRANULOCYTES # BLD AUTO: 0.02 10*3/MM3 (ref 0–0.05)
IMM GRANULOCYTES NFR BLD AUTO: 0.3 % (ref 0–0.5)
LYMPHOCYTES # BLD AUTO: 2.57 10*3/MM3 (ref 0.7–3.1)
LYMPHOCYTES NFR BLD AUTO: 38.9 % (ref 19.6–45.3)
MAGNESIUM SERPL-MCNC: 2.2 MG/DL (ref 1.6–2.4)
MCH RBC QN AUTO: 32.9 PG (ref 26.6–33)
MCHC RBC AUTO-ENTMCNC: 34.6 G/DL (ref 31.5–35.7)
MCV RBC AUTO: 95 FL (ref 79–97)
MONOCYTES # BLD AUTO: 0.9 10*3/MM3 (ref 0.1–0.9)
MONOCYTES NFR BLD AUTO: 13.6 % (ref 5–12)
NEUTROPHILS NFR BLD AUTO: 2.94 10*3/MM3 (ref 1.7–7)
NEUTROPHILS NFR BLD AUTO: 44.4 % (ref 42.7–76)
NRBC BLD AUTO-RTO: 0 /100 WBC (ref 0–0.2)
PLATELET # BLD AUTO: 193 10*3/MM3 (ref 140–450)
PMV BLD AUTO: 9.3 FL (ref 6–12)
POTASSIUM SERPL-SCNC: 4.1 MMOL/L (ref 3.5–5.2)
PROT SERPL-MCNC: 7 G/DL (ref 6–8.5)
RBC # BLD AUTO: 4.59 10*6/MM3 (ref 4.14–5.8)
SALICYLATES SERPL-MCNC: <0.3 MG/DL
SODIUM SERPL-SCNC: 136 MMOL/L (ref 136–145)
WBC NRBC COR # BLD AUTO: 6.61 10*3/MM3 (ref 3.4–10.8)

## 2024-09-26 PROCEDURE — 82077 ASSAY SPEC XCP UR&BREATH IA: CPT | Performed by: EMERGENCY MEDICINE

## 2024-09-26 PROCEDURE — 99285 EMERGENCY DEPT VISIT HI MDM: CPT

## 2024-09-26 PROCEDURE — 80179 DRUG ASSAY SALICYLATE: CPT | Performed by: EMERGENCY MEDICINE

## 2024-09-26 PROCEDURE — 83735 ASSAY OF MAGNESIUM: CPT | Performed by: EMERGENCY MEDICINE

## 2024-09-26 PROCEDURE — 83605 ASSAY OF LACTIC ACID: CPT | Performed by: EMERGENCY MEDICINE

## 2024-09-26 PROCEDURE — 85025 COMPLETE CBC W/AUTO DIFF WBC: CPT | Performed by: EMERGENCY MEDICINE

## 2024-09-26 PROCEDURE — 80143 DRUG ASSAY ACETAMINOPHEN: CPT | Performed by: EMERGENCY MEDICINE

## 2024-09-26 PROCEDURE — 36415 COLL VENOUS BLD VENIPUNCTURE: CPT

## 2024-09-26 PROCEDURE — 80053 COMPREHEN METABOLIC PANEL: CPT | Performed by: EMERGENCY MEDICINE

## 2024-09-26 PROCEDURE — 93005 ELECTROCARDIOGRAM TRACING: CPT | Performed by: EMERGENCY MEDICINE

## 2024-09-26 PROCEDURE — 25810000003 SODIUM CHLORIDE 0.9 % SOLUTION: Performed by: EMERGENCY MEDICINE

## 2024-09-26 RX ORDER — SODIUM CHLORIDE 0.9 % (FLUSH) 0.9 %
10 SYRINGE (ML) INJECTION AS NEEDED
Status: DISCONTINUED | OUTPATIENT
Start: 2024-09-26 | End: 2024-09-27 | Stop reason: HOSPADM

## 2024-09-26 RX ADMIN — SODIUM CHLORIDE 1000 ML: 9 INJECTION, SOLUTION INTRAVENOUS at 22:02

## 2024-09-27 VITALS
RESPIRATION RATE: 18 BRPM | WEIGHT: 152 LBS | TEMPERATURE: 97.4 F | BODY MASS INDEX: 22.51 KG/M2 | HEART RATE: 75 BPM | DIASTOLIC BLOOD PRESSURE: 82 MMHG | OXYGEN SATURATION: 97 % | SYSTOLIC BLOOD PRESSURE: 153 MMHG | HEIGHT: 69 IN

## 2024-09-27 LAB
AMPHET+METHAMPHET UR QL: NEGATIVE
AMPHETAMINES UR QL: NEGATIVE
BARBITURATES UR QL SCN: NEGATIVE
BENZODIAZ UR QL SCN: POSITIVE
BILIRUB UR QL STRIP: NEGATIVE
BUPRENORPHINE SERPL-MCNC: NEGATIVE NG/ML
CANNABINOIDS SERPL QL: POSITIVE
CLARITY UR: CLEAR
COCAINE UR QL: NEGATIVE
COLOR UR: YELLOW
D-LACTATE SERPL-SCNC: 1.6 MMOL/L (ref 0.5–2)
FENTANYL UR-MCNC: NEGATIVE NG/ML
GLUCOSE UR STRIP-MCNC: NEGATIVE MG/DL
HGB UR QL STRIP.AUTO: NEGATIVE
KETONES UR QL STRIP: NEGATIVE
LEUKOCYTE ESTERASE UR QL STRIP.AUTO: NEGATIVE
METHADONE UR QL SCN: NEGATIVE
NITRITE UR QL STRIP: NEGATIVE
OPIATES UR QL: NEGATIVE
OXYCODONE UR QL SCN: NEGATIVE
PCP UR QL SCN: NEGATIVE
PH UR STRIP.AUTO: 6.5 [PH] (ref 5–8)
PROT UR QL STRIP: NEGATIVE
SP GR UR STRIP: <=1.005 (ref 1–1.03)
TRICYCLICS UR QL SCN: NEGATIVE
UROBILINOGEN UR QL STRIP: NORMAL

## 2024-09-27 PROCEDURE — 83605 ASSAY OF LACTIC ACID: CPT | Performed by: EMERGENCY MEDICINE

## 2024-09-27 PROCEDURE — 80307 DRUG TEST PRSMV CHEM ANLYZR: CPT | Performed by: EMERGENCY MEDICINE

## 2024-09-27 PROCEDURE — 81003 URINALYSIS AUTO W/O SCOPE: CPT | Performed by: EMERGENCY MEDICINE

## 2024-09-27 RX ORDER — ACETAMINOPHEN 500 MG
1000 TABLET ORAL ONCE
Status: COMPLETED | OUTPATIENT
Start: 2024-09-27 | End: 2024-09-27

## 2024-09-27 RX ADMIN — ACETAMINOPHEN 1000 MG: 500 TABLET ORAL at 02:56

## 2024-09-27 NOTE — ED PROVIDER NOTES
Subjective   History of Present Illness  Is a 68-year-old male with past medical history of anxiety and COPD presented to the emergency department with a potential overdose.  The patient admits to drinking alcohol tonight.  He states that he also took Toradol, tramadol and Xanax.  The patient states he may have taken 20 tablets of his Toradol but is unsure how much Xanax and tramadol he took.  Patient states that he was trying to harm himself.  Is been very depressed lately.  Denies any homicidal ideations.  No hallucinations.  Does not know how much alcohol he drank this evening.  Denies any headache or change in vision.  No focal weakness.  Chest pain or shortness of breath.  No abdominal pain or vomiting.    History provided by:  Patient and EMS personnel   used: No        Review of Systems   Constitutional:  Negative for chills and fever.   HENT:  Negative for congestion, ear pain and sore throat.    Eyes:  Negative for visual disturbance.   Respiratory:  Negative for shortness of breath.    Cardiovascular:  Negative for chest pain.   Gastrointestinal:  Negative for abdominal pain.   Genitourinary:  Negative for difficulty urinating.   Musculoskeletal:  Negative for arthralgias.   Skin:  Negative for rash.   Neurological:  Negative for dizziness, weakness and numbness.   Psychiatric/Behavioral:  Positive for suicidal ideas. Negative for agitation. The patient is nervous/anxious.        Past Medical History:   Diagnosis Date    A-fib     Anxiety     Arthritis     Cancer     melanoma back    Cataract     COPD (chronic obstructive pulmonary disease)     Fainting     Hypertension        Allergies   Allergen Reactions    Diclofenac GI Intolerance       Past Surgical History:   Procedure Laterality Date    CARDIAC ELECTROPHYSIOLOGY PROCEDURE N/A 6/10/2020    Procedure: EP/Ablation Atypical AFL w ICE & SJM or Carto mapping, ASAP, pt has no meds that need held;  Surgeon: Beto Solomon DO;   Location: [facility] INVASIVE LOCATION;  Service: Cardiology;  Laterality: N/A;    CATARACT EXTRACTION      right eye    ELBOW ARTHROSCOPY Left     left elbow twice    KNEE ARTHROSCOPY Right     SHOULDER ARTHROSCOPY Right     left and right but had left shoulder surgery twice    TOE ARTHROPLASTY Right        Family History   Problem Relation Age of Onset    Hyperlipidemia Mother     Lung cancer Father     Diabetes Father     Kidney failure Father        Social History     Socioeconomic History    Marital status:    Tobacco Use    Smoking status: Every Day     Current packs/day: 0.50     Types: Cigarettes     Passive exposure: Current    Smokeless tobacco: Never   Vaping Use    Vaping status: Never Used   Substance and Sexual Activity    Alcohol use: Not Currently     Comment: 7-10 drinks/week    Drug use: Not Currently     Frequency: 7.0 times per week     Types: Marijuana     Comment: daily    Sexual activity: Defer           Objective   Physical Exam  Vitals and nursing note reviewed.   Constitutional:       General: He is not in acute distress.     Appearance: He is not ill-appearing or toxic-appearing.   HENT:      Mouth/Throat:      Pharynx: No posterior oropharyngeal erythema.   Eyes:      Extraocular Movements: Extraocular movements intact.      Pupils: Pupils are equal, round, and reactive to light.   Cardiovascular:      Rate and Rhythm: Normal rate and regular rhythm.   Pulmonary:      Effort: Pulmonary effort is normal. No respiratory distress.   Abdominal:      General: Abdomen is flat. There is no distension.      Palpations: There is no mass.      Tenderness: There is no abdominal tenderness. There is no guarding or rebound.   Musculoskeletal:         General: No deformity. Normal range of motion.   Neurological:      General: No focal deficit present.      Mental Status: He is alert.      Sensory: No sensory deficit.      Motor: No weakness.         ECG 12 Lead      Date/Time: 9/26/2024 10:12  PM    Performed by: Bakari Salazar MD  Authorized by: Bakari Salazar MD  Interpreted by ED physician  Comparison: compared with previous ECG   Similar to previous ECG  Rhythm: sinus rhythm  Rate: normal  BPM: 80  QRS axis: left  Conduction: conduction normal  Other findings comments: Nonspecific ST changes  Clinical impression: non-specific ECG  Comments: Interpretation:  EKG was directly visualized by myself, interpretations as documented in hospital course.               ED Course  ED Course as of 09/27/24 0316   Thu Sep 26, 2024   2129 Patient was placed in suicide precautions.  Continuous telemetry [JK]   2213 BP: 94/63 [JK]   2213 Heart Rate: 79 [JK]   2213 SpO2: 94 %  Interpretation:  Patient's repeat vitals, telemetry tracing, and pulse oximetry tracing were directly viewed and interpreted by myself.   O2 sat 94% on room air, interpreted as normal  Telemetry rhythm strip revealed a rate of 79 bpm, interpreted as normal sinus rhythm [JK]   Fri Sep 27, 2024   0313 Comprehensive Metabolic Panel(!) [JK]   0313 CBC & Differential(!) [JK]   0313 Lactic Acid, Plasma(!!) [JK]   0313 Ethanol(!) [JK]   0313 Salicylate Level [JK]   0313 Magnesium [JK]   0313 Fentanyl, Urine - Urine, Clean Catch [JK]   0313 STAT Lactic Acid, Reflex [JK]   0313 Urinalysis With Microscopic If Indicated (No Culture) - Urine, Clean Catch [JK]   0313 Urine Drug Screen - Urine, Clean Catch(!)  Interpretation:  Laboratory studies were reviewed and interpreted directly by myself.  CMP was unremarkable, Tylenol level negative, ethanol was elevated 197, salicylates negative, magnesium normal, UDS was positive for THC and benzos, initial lactate was 2.6, repeat was 1.6, CBC normal, urine analysis normal [JK]   0314 On reevaluation, the patient is feeling much better.  He is alert and appropriate.  I did explain the patient that I would like to have behavioral health evaluate him.  However he is declining at this time.  He states that he  just made those comments earlier because he was upset.  States that he actually had no intention of harming himself.  He did not take too many of his medications.  He has no plans for suicidal or homicidal ideation.  I again offered evaluation by behavioral health, however he declined.  Patient is wishing to be discharged home.  Given strict return precautions for any change in symptoms.  Patient ambulated without any difficulties.  He is alert and appropriate.  Full decision-making capabilities at this time.  Patient was given strict return precautions.  Verbalized understanding. [JK]   4945 I had a discussion with the patient/family regarding diagnosis, diagnostic results, treatment plan, and medications. The patient/family indicated understanding of these instructions. I spent adequate time at the bedside prior to discharge necessary to discuss the aftercare instructions, giving patient education, providing explanations of the results of our evaluations/findings, and my decision making to assure that the patient/family understand the plan of care. Time was allotted to answer questions at that time and throughout the ED course. Patient is required to maintain timely follow up, as discussed. I also discussed the potential for the development of an acute emergent condition requiring further evaluation, return to the ER, admission, or even surgical intervention.  I encouraged the patient to return to the emergency department immediately for any concerns, worsening symptoms, new complaints, or if symptoms persist and they are unable to seek follow-up in a timely fashion. The patient/family expressed understanding and agreement with this plan    Shared decision making:   After full review of the patient's clinical presentation, review of any work-up including but not limited to laboratory studies and radiology obtained, I had a discussion with the patient.  Treatment options were discussed as well as the risks,  benefits and consequences.  I discussed all findings with the patient and family members if available.  During the discussion, treatment goals were understood by all as well as any misconceptions which were addressed with the patient.  Ample time was given for any questions they may have had.  They are in agreement with the treatment plan as well as final disposition. [JK]      ED Course User Index  [JK] Bakari Salazar MD                                             Medical Decision Making  This is a 68-year-old male with a history of anxiety and hypertension presenting to the emergency department with possible suicide time.  The patient is very vague on his initial exam and does admit to alcohol use.  He states that he tried to harm self by taking numerous different pills.  Is difficult to assess exactly what the patient took..  Overall, the patient is nontoxic.  Afebrile.  IV access was established and the patient.  Placed on continuous telemetry monitoring.  Poison control was notified given the patient's presentation, differential is broad and will require further evaluation.  Workup initiated.      Differential diagnosis: Alcohol abuse, anxiety, depression, suicide attempt, ingestion, electrolyte abnormality, acute kidney injury, dysrhythmia      Amount and/or Complexity of Data Reviewed  Independent Historian: EMS  External Data Reviewed: labs, radiology, ECG and notes.     Details: External laboratories, imaging as well as notes were reviewed personally by myself.  All relevant studies were used to guide decision making.     Date of previous record: 5/23/2024    Source of note: Primary physician    Summary:  Patient was seen and evaluated for routine visit.  I did review basic laboratory studies on file as well as a previous chest x-ray and EKG.  Records reviewed    Labs: ordered. Decision-making details documented in ED Course.  ECG/medicine tests: ordered and independent interpretation performed.  Decision-making details documented in ED Course.  Discussion of management or test interpretation with external provider(s): Poison control was notified regarding the patient.  Proceed with expectant management.    Risk  OTC drugs.  Prescription drug management.    Critical Care  Total time providing critical care: 35 minutes (Authorized and performed by: Bakari Salazar MD  I personally spent a total of 35 minutes of critical care time with the patient.  Due to the high probability of clinically significant, life-threatening deterioration, the patient required my highest level of care to intervene emergently.  These interventions, including, but not limited to, establishing IV access, continuous pulse oximeter and telemetry monitoring, frequent monitoring and reevaluations, management the patient's airway and cardiovascular system, discussion with other consultants as needed, which bear directly on the management the patient.  This also includes obtaining history, examining the patient, frequent reevaluations and coordinating high level of care.  Failure to emergently initiate these interventions would carry a high probability of resulting in sudden, clinically significant or life threatening deterioration in the patient's condition.  This does not include time spent on separately reported billable procedures.)        Final diagnoses:   Alcohol abuse   Substance use   Depression, unspecified depression type   Anxiety       ED Disposition  ED Disposition       ED Disposition   Discharge    Condition   Stable    Comment   --               Shivam Cali MD  [address]  [facility] [address]  [phone]    Call in 1 day      [facility]  [address]  [facility] [address]  [phone]  Call in 1 day           Medication List        Stop      ketorolac 10 MG tablet  Commonly known as: TORADOL                 Bakari Salazar MD  09/27/24 0316

## 2024-09-29 LAB
QT INTERVAL: 400 MS
QTC INTERVAL: 461 MS

## 2024-10-04 RX ORDER — ESCITALOPRAM OXALATE 10 MG/1
10 TABLET ORAL DAILY
Qty: 90 TABLET | Refills: 1 | Status: SHIPPED | OUTPATIENT
Start: 2024-10-04

## 2024-10-04 RX ORDER — METHOCARBAMOL 750 MG/1
TABLET, FILM COATED ORAL
Qty: 60 TABLET | Refills: 1 | Status: SHIPPED | OUTPATIENT
Start: 2024-10-04

## 2024-10-07 ENCOUNTER — TELEPHONE (OUTPATIENT)
Dept: FAMILY MEDICINE CLINIC | Facility: CLINIC | Age: 69
End: 2024-10-07
Payer: MEDICARE

## 2024-10-07 RX ORDER — METHYLPREDNISOLONE 4 MG
TABLET, DOSE PACK ORAL
Qty: 1 EACH | Refills: 0 | Status: SHIPPED | OUTPATIENT
Start: 2024-10-07

## 2024-10-07 NOTE — TELEPHONE ENCOUNTER
"Caller: Stepan Gaines \"Tomas\"    Relationship: Self    Best call back number:  [phone] (Mobile)     What medication are you requesting:  PREDNISONE     What are your current symptoms:  SEVERE BACK PAIN     If a prescription is needed, what is your preferred pharmacy and phone number:    [facility] [address]      Additional notes:        "

## 2024-10-22 DIAGNOSIS — M54.2 NECK PAIN: ICD-10-CM

## 2024-10-25 NOTE — TELEPHONE ENCOUNTER
PATIENT IS CALLING TO CHECK STATUS OF MEDICATION REQUEST. PATIENT IS OUT OF MEDICATION.  PLEASE CALL AND UPDATE

## 2024-10-28 NOTE — TELEPHONE ENCOUNTER
Patient is currently on Robaxin 750mg and Flexeril 10mg and  tramadol    Tizanidine was canceled from med list on 08/08/2024 by Dr. OSEGUERA

## 2024-10-29 ENCOUNTER — TELEPHONE (OUTPATIENT)
Dept: FAMILY MEDICINE CLINIC | Facility: CLINIC | Age: 69
End: 2024-10-29

## 2024-10-29 NOTE — TELEPHONE ENCOUNTER
"    Caller: Stepan Gaines \"Tomas\"    Relationship: Self    Best call back number: [phone]     Which medication are you concerned about: TIZANIDINE 4MG            What are your concerns: REFER TO TELEPHONE ENCOUNTER DATED 10/22/24. PATIENT STATED HE IS NOT TAKING THE FLEXERIL. HE IS TAKING ROBAXIN. REQUEST A REFILL FOR TIZANIDINE TO GO TO [facility] TODAY ASAP. PATIENT IS COMPLETELY OUT OF THE TIZANIDINE. IF THERE ARE ANY ISSUES CALL THE PATIENT. NO ONE EVER CALLED HIM      "

## 2024-10-30 NOTE — TELEPHONE ENCOUNTER
Cancel prescriptions for Flexeril and Robaxin.  New prescription for tizanidine 4 mg at bedtime as needed #30 with 2 refills.

## 2024-12-11 ENCOUNTER — TELEPHONE (OUTPATIENT)
Dept: FAMILY MEDICINE CLINIC | Facility: CLINIC | Age: 69
End: 2024-12-11

## 2024-12-11 RX ORDER — PREDNISONE 10 MG/1
TABLET ORAL
Qty: 30 TABLET | Refills: 0 | Status: SHIPPED | OUTPATIENT
Start: 2024-12-11

## 2024-12-11 NOTE — TELEPHONE ENCOUNTER
"Caller: Stepan Gaines \"Tomas\"    Relationship: Self    Best call back number: [phone]    What medication are you requesting: PREDNISONE    What are your current symptoms: BACK PAIN FLARE UP    How long have you been experiencing symptoms: 10 DAYS    Have you had these symptoms before:    [x] Yes  [] No    Have you been treated for these symptoms before:   [x] Yes  [] No    If a prescription is needed, what is your preferred pharmacy and phone number:    [facility] [phone]  Additional notes:    PATIENT IS HAVING BACK PAIN FLARE UP AND WOULD LIKE MEDICATION. PLEASE ADVISE    "

## 2024-12-28 DIAGNOSIS — G40.909 NONINTRACTABLE EPILEPSY WITHOUT STATUS EPILEPTICUS, UNSPECIFIED EPILEPSY TYPE: ICD-10-CM

## 2024-12-30 RX ORDER — BRIVARACETAM 75 MG/1
75 TABLET, FILM COATED ORAL 2 TIMES DAILY
Qty: 60 TABLET | Refills: 1 | Status: SHIPPED | OUTPATIENT
Start: 2024-12-30

## 2024-12-30 RX ORDER — IBUPROFEN 800 MG/1
800 TABLET, FILM COATED ORAL EVERY 6 HOURS PRN
Qty: 120 TABLET | Refills: 5 | Status: SHIPPED | OUTPATIENT
Start: 2024-12-30

## 2024-12-30 NOTE — TELEPHONE ENCOUNTER
Rx Refill Note  Requested Prescriptions     Pending Prescriptions Disp Refills    Briviact 75 MG tablet [Pharmacy Med Name: BRIVIACT 75 MG TABLET] 60 tablet      Sig: TAKE 1 TABLET BY MOUTH 2 TIMES A DAY      Last filled:6/27/24 5 refill  Last office visit with prescribing clinician: 7/11/2024      Next office visit with prescribing clinician: 1/22/2025     MARLEN MARIN  12/30/24, 11:21 EST    Pending to provider

## 2025-01-03 ENCOUNTER — OFFICE VISIT (OUTPATIENT)
Dept: FAMILY MEDICINE CLINIC | Facility: CLINIC | Age: 70
End: 2025-01-03
Payer: MEDICAID

## 2025-01-03 VITALS
RESPIRATION RATE: 16 BRPM | HEIGHT: 69 IN | HEART RATE: 73 BPM | WEIGHT: 154 LBS | TEMPERATURE: 98 F | BODY MASS INDEX: 22.81 KG/M2 | OXYGEN SATURATION: 95 % | DIASTOLIC BLOOD PRESSURE: 56 MMHG | SYSTOLIC BLOOD PRESSURE: 110 MMHG

## 2025-01-03 DIAGNOSIS — G47.00 INSOMNIA, UNSPECIFIED TYPE: ICD-10-CM

## 2025-01-03 DIAGNOSIS — F41.9 ANXIETY: ICD-10-CM

## 2025-01-03 DIAGNOSIS — M47.816 LUMBAR SPONDYLOSIS: Primary | ICD-10-CM

## 2025-01-03 DIAGNOSIS — M54.16 RADICULOPATHY, LUMBAR REGION: ICD-10-CM

## 2025-01-03 DIAGNOSIS — B07.0 PLANTAR WART OF RIGHT FOOT: ICD-10-CM

## 2025-01-03 DIAGNOSIS — M47.812 CERVICAL SPONDYLOSIS: ICD-10-CM

## 2025-01-03 PROCEDURE — 1160F RVW MEDS BY RX/DR IN RCRD: CPT | Performed by: FAMILY MEDICINE

## 2025-01-03 PROCEDURE — 1125F AMNT PAIN NOTED PAIN PRSNT: CPT | Performed by: FAMILY MEDICINE

## 2025-01-03 PROCEDURE — 99214 OFFICE O/P EST MOD 30 MIN: CPT | Performed by: FAMILY MEDICINE

## 2025-01-03 PROCEDURE — 1159F MED LIST DOCD IN RCRD: CPT | Performed by: FAMILY MEDICINE

## 2025-01-03 RX ORDER — HYDROCODONE BITARTRATE AND ACETAMINOPHEN 7.5; 325 MG/1; MG/1
1 TABLET ORAL EVERY 8 HOURS PRN
Qty: 15 TABLET | Refills: 0 | Status: SHIPPED | OUTPATIENT
Start: 2025-01-03

## 2025-01-03 RX ORDER — TRAMADOL HYDROCHLORIDE 50 MG/1
50 TABLET ORAL EVERY 8 HOURS PRN
Qty: 90 TABLET | Refills: 5 | Status: SHIPPED | OUTPATIENT
Start: 2025-01-03

## 2025-01-03 RX ORDER — ACETAMINOPHEN 500 MG
1000 TABLET ORAL EVERY 6 HOURS PRN
Qty: 100 TABLET | Refills: 5 | Status: SHIPPED | OUTPATIENT
Start: 2025-01-03

## 2025-01-03 RX ORDER — METHOCARBAMOL 750 MG/1
TABLET, FILM COATED ORAL
COMMUNITY
Start: 2024-12-28

## 2025-01-03 RX ORDER — TRAZODONE HYDROCHLORIDE 50 MG/1
TABLET, FILM COATED ORAL
Qty: 60 TABLET | Refills: 5 | Status: SHIPPED | OUTPATIENT
Start: 2025-01-03

## 2025-01-06 NOTE — PROGRESS NOTES
Subjective   Stepan Gaines is a 69 y.o. male    Chief Complaint    Generalized anxiety  Hypertension  Chronic back pain  Plantars wart  Medication refills    Anxiety   Symptoms include nervous/anxious behavior.  Patient reports no confusion, decreased concentration, dizziness, nausea, shortness of breath or suicidal ideas.   Back Pain  Pertinent negatives include no abdominal pain, headaches, numbness or weakness.   Hypertension  Associated symptoms include anxiety. Pertinent negatives include no headaches or shortness of breath.   History of Present Illness  The patient is a 69-year-old male here today for follow-up related to hypertension, anxiety, back pain, plantar warts, and medication refills.    He reports that the liquid nitrogen treatment for his plantar warts was ineffective, as the warts peeled off but subsequently reappeared. He is reluctant to undergo surgical removal due to concerns about scarring and increased pain during ambulation.    He is requesting refills of his Tylenol and would like to try lidocaine patches if they will be covered by his insurance. He has been using lidocaine patches on his back, shoulders, and neck. He also uses medical marijuana for his seizures. He is also given an occasional hydrocodone for severe pain, with the last prescription of 15 tablets filled in September 2024. He expresses a desire to avoid hospital visits and prefers to manage his pain at home. He has been experiencing balance issues and plans to discuss this with Dr. Parekh. He also reports difficulty with finger dexterity, which is problematic for his guitar playing. He has a history of a navicular fracture in his wrist and was informed that his hands exhibit signs of arthritis. He acknowledges a family history of arthritis and believes his condition is exacerbated by his injuries, falls, and seizures.    He has been experiencing severe back pain, which led to a fall on Christmas night. The pain was  exacerbated when he stepped on a sock on a hardwood floor, resulting in a slip and subsequent fall. He describes the area as tender. He has tried acupuncture for his back pain but found it ineffective. He also reports neck pain, which he attributes to his shoulder issues. His sleep has been disrupted due to discomfort and pain. Dr. Parekh suggested switching from Xanax to trazodone for nighttime use.    FAMILY HISTORY  The patient reports a family history of arthritis.          The following portions of the patient's history were reviewed and updated as appropriate: allergies, current medications, past social history and problem list    Review of Systems   Constitutional: Negative.  Negative for appetite change and fatigue.   Respiratory: Negative.  Negative for chest tightness and shortness of breath.    Gastrointestinal: Negative.  Negative for abdominal pain, diarrhea and nausea.   Musculoskeletal:  Positive for back pain. Negative for arthralgias, gait problem and myalgias.   Skin:         Plantars wart   Neurological:  Negative for dizziness, tremors, weakness, light-headedness, numbness and headaches.   Psychiatric/Behavioral:  Positive for dysphoric mood and sleep disturbance. Negative for agitation, behavioral problems, confusion, decreased concentration and suicidal ideas. The patient is nervous/anxious.      Objective     Vitals:    01/03/25 0858   BP: 110/56   Pulse: 73   Resp: 16   Temp: 98 °F (36.7 °C)   SpO2: 95%       Physical Exam  Vitals and nursing note reviewed.   Constitutional:       General: He is not in acute distress.     Appearance: Normal appearance. He is well-developed. He is not ill-appearing, toxic-appearing or diaphoretic.   HENT:      Head: Normocephalic and atraumatic.   Eyes:      General: No scleral icterus.     Conjunctiva/sclera: Conjunctivae normal.      Pupils: Pupils are equal, round, and reactive to light.   Neck:      Vascular: No carotid bruit or JVD.   Cardiovascular:       Rate and Rhythm: Normal rate and regular rhythm.      Pulses: Normal pulses.      Heart sounds: Normal heart sounds. No murmur heard.  Pulmonary:      Effort: Pulmonary effort is normal. No respiratory distress.      Breath sounds: Normal breath sounds.   Abdominal:      General: Bowel sounds are normal.      Palpations: Abdomen is soft.      Tenderness: There is no abdominal tenderness.   Musculoskeletal:      Lumbar back: Tenderness and bony tenderness present. No swelling, deformity or spasms. Decreased range of motion.   Skin:     General: Skin is warm and dry.      Comments: Plantars wart right foot, plantar surface, distal fifth metatarsal head, tender.   Neurological:      Mental Status: He is alert and oriented to person, place, and time.      Deep Tendon Reflexes: Reflexes are normal and symmetric.   Psychiatric:         Mood and Affect: Mood normal.         Behavior: Behavior normal.   Physical Exam      Assessment & Plan   Assessment & Plan  1. Plantar warts.  A prescription for salicylic acid 40% (Mediplast) has been provided. He is instructed to cut the pad into a small square, apply it to the wart, and secure it with adhesive tape overnight. This process should be repeated nightly until the wart begins to peel off.    2. Back pain.  His tramadol prescription has been refilled. He is advised to continue using lidocaine patches if covered by insurance.    3. Anxiety.  His medication has been switched from Xanax to trazodone for nighttime use.    4. Medication management.  Refills for Tylenol and hydrocodone have been requested. Hydrocodone will be provided as needed for severe pain, with a few tablets prescribed to manage pain without requiring a visit to the pain clinic.    Problems Addressed this Visit    None  Visit Diagnoses       Lumbar spondylosis        Relevant Medications    HYDROcodone-acetaminophen (NORCO) 7.5-325 MG per tablet    traMADol (ULTRAM) 50 MG tablet    Radiculopathy, lumbar region         Relevant Medications    HYDROcodone-acetaminophen (NORCO) 7.5-325 MG per tablet    traMADol (ULTRAM) 50 MG tablet          Diagnoses         Codes Comments    Lumbar spondylosis     ICD-10-CM: M47.816  ICD-9-CM: 721.3     Radiculopathy, lumbar region     ICD-10-CM: M54.16  ICD-9-CM: 724.4

## 2025-01-10 ENCOUNTER — APPOINTMENT (OUTPATIENT)
Dept: GENERAL RADIOLOGY | Facility: HOSPITAL | Age: 70
End: 2025-01-10
Payer: MEDICARE

## 2025-01-10 ENCOUNTER — HOSPITAL ENCOUNTER (EMERGENCY)
Facility: HOSPITAL | Age: 70
Discharge: HOME | End: 2025-01-10
Attending: EMERGENCY MEDICINE
Payer: MEDICARE

## 2025-01-10 VITALS
HEART RATE: 60 BPM | HEIGHT: 69 IN | TEMPERATURE: 97.7 F | OXYGEN SATURATION: 98 % | WEIGHT: 152.45 LBS | BODY MASS INDEX: 22.58 KG/M2 | SYSTOLIC BLOOD PRESSURE: 142 MMHG | DIASTOLIC BLOOD PRESSURE: 75 MMHG | RESPIRATION RATE: 16 BRPM

## 2025-01-10 DIAGNOSIS — S62.114A CLOSED NONDISPLACED FRACTURE OF TRIQUETRUM OF RIGHT WRIST, INITIAL ENCOUNTER: Primary | ICD-10-CM

## 2025-01-10 PROCEDURE — 73110 X-RAY EXAM OF WRIST: CPT

## 2025-01-10 PROCEDURE — 73130 X-RAY EXAM OF HAND: CPT

## 2025-01-10 PROCEDURE — 99283 EMERGENCY DEPT VISIT LOW MDM: CPT

## 2025-01-10 RX ORDER — OXYCODONE AND ACETAMINOPHEN 7.5; 325 MG/1; MG/1
1 TABLET ORAL EVERY 6 HOURS PRN
Qty: 12 TABLET | Refills: 0 | Status: SHIPPED | OUTPATIENT
Start: 2025-01-10

## 2025-01-10 RX ORDER — OXYCODONE AND ACETAMINOPHEN 7.5; 325 MG/1; MG/1
1 TABLET ORAL ONCE
Status: COMPLETED | OUTPATIENT
Start: 2025-01-10 | End: 2025-01-10

## 2025-01-10 RX ADMIN — OXYCODONE HYDROCHLORIDE AND ACETAMINOPHEN 1 TABLET: 7.5; 325 TABLET ORAL at 13:49

## 2025-01-14 ENCOUNTER — PATIENT OUTREACH (OUTPATIENT)
Dept: CASE MANAGEMENT | Facility: OTHER | Age: 70
End: 2025-01-14
Payer: MEDICARE

## 2025-01-14 DIAGNOSIS — S62.114A CLOSED NONDISPLACED FRACTURE OF TRIQUETRUM OF RIGHT WRIST, INITIAL ENCOUNTER: ICD-10-CM

## 2025-01-14 NOTE — TELEPHONE ENCOUNTER
He should be casted or immobilized in a splint and seeing Ortho in follow-up.  He should not be on opioid pain medicines for fracture that occurred 4 days ago.  The answer is no opioids from me.

## 2025-01-14 NOTE — TELEPHONE ENCOUNTER
"Caller: Stepan Gaines \"Tomas\"    Relationship: Self    Best call back number: [phone]     Requested Prescriptions:   Requested Prescriptions     Pending Prescriptions Disp Refills    oxyCODONE-acetaminophen (PERCOCET) 7.5-325 MG per tablet 12 tablet 0     Sig: Take 1 tablet by mouth Every 6 (Six) Hours As Needed for Severe Pain.        Pharmacy where request should be sent:  [facility] [id number] [place] [id number] [address] - [phone] [facility] [phone]      Last office visit with prescribing clinician: 1/3/2025   Last telemedicine visit with prescribing clinician: Visit date not found   Next office visit with prescribing clinician: Visit date not found     Additional details provided by patient: PATIENT STATED THAT HE HAS BROKE HIS RIGHT WRIST AND WAS SEEN AT THE ER. THEY GAVE HIM PERCOCET AT THE HOSPITAL AND HE IS NOW REQUESTING A REFILL OF TWO A DAY FOR THE NEXT 5 DAYS IF POSSIBLE FOR THE PAIN,.    PLEASE CALL AND ADVISE     "

## 2025-01-14 NOTE — TELEPHONE ENCOUNTER
Additional details provided by patient: PATIENT STATED THAT HE HAS BROKE HIS RIGHT WRIST AND WAS SEEN AT THE ER. THEY GAVE HIM PERCOCET AT THE HOSPITAL AND HE IS NOW REQUESTING A REFILL OF TWO A DAY FOR THE NEXT 5 DAYS IF POSSIBLE FOR THE PAIN,.

## 2025-01-14 NOTE — OUTREACH NOTE
"AMBULATORY CASE MANAGEMENT NOTE    Names and Relationships of Patient/Support Persons: Contact: Stepan Gaines \"Tomas\"; Relationship: Self -     Patient Outreach    Outreach call to pt for HRCM after recent ED visit for fall on ice and right wrist fracture. Pt reported he has 2 percocets left and has a call into his pcp today for a refill request. He voiced intent to call and schedule with ortho today. He indicated he uses transportation through his medicaid which requires advanced notification. Per his report, he is utilizing the splint that was provided by the ED.    Education Documentation - Fracture  Unresolved/Worsening Symptoms, taught by Yara Larose, RN at 1/14/2025  2:00 PM.  Learner: Patient  Readiness: Acceptance  Method: Explanation  Response: Verbalizes Understanding    Provider Follow-Up, taught by Yara Larose, RN at 1/14/2025  2:00 PM.  Learner: Patient  Readiness: Acceptance  Method: Explanation  Response: Verbalizes Understanding    Medication Management, taught by Yara Larose, RN at 1/14/2025  2:00 PM.  Learner: Patient  Readiness: Acceptance  Method: Explanation  Response: Verbalizes Understanding    Home Safety, taught by Yara Larose, RN at 1/14/2025  2:00 PM.  Learner: Patient  Readiness: Acceptance  Method: Explanation  Response: Verbalizes Understanding    Yara SANCHEZ  Ambulatory Case Management    1/14/2025, 14:00 EST  "

## 2025-01-15 DIAGNOSIS — S62.114A CLOSED NONDISPLACED FRACTURE OF TRIQUETRUM OF RIGHT WRIST, INITIAL ENCOUNTER: ICD-10-CM

## 2025-01-15 RX ORDER — OXYCODONE AND ACETAMINOPHEN 7.5; 325 MG/1; MG/1
1 TABLET ORAL EVERY 6 HOURS PRN
Qty: 12 TABLET | Refills: 0 | OUTPATIENT
Start: 2025-01-15

## 2025-01-15 NOTE — TELEPHONE ENCOUNTER
"Caller: Stepan Gaines \"Tomas\"    Relationship: Self    Best call back number: [phone]    Requested Prescriptions:   Requested Prescriptions     Pending Prescriptions Disp Refills    oxyCODONE-acetaminophen (PERCOCET) 7.5-325 MG per tablet 12 tablet 0     Sig: Take 1 tablet by mouth Every 6 (Six) Hours As Needed for Severe Pain.        Pharmacy where request should be sent:    [facility] [phone]  Last office visit with prescribing clinician: 1/3/2025   Last telemedicine visit with prescribing clinician: Visit date not found   Next office visit with prescribing clinician: Visit date not found     Additional details provided by patient: PATIENT FELL ON 01/10/2025 AND BROKE WRIST AND IS OUT OF PAIN MEDICATION. PATIENT SEES SPECIALIST TOMORROW  Does the patient have less than a 3 day supply:  [x] Yes  [] No    Would you like a call back once the refill request has been completed: [] Yes [x] No    If the office needs to give you a call back, can they leave a voicemail: [] Yes [x] No    Paula Cottrell Rep   01/15/25 10:36 EST       "

## 2025-01-16 ENCOUNTER — OFFICE VISIT (OUTPATIENT)
Dept: ORTHOPEDIC SURGERY | Facility: CLINIC | Age: 70
End: 2025-01-16
Payer: MEDICARE

## 2025-01-16 VITALS
WEIGHT: 152.56 LBS | BODY MASS INDEX: 22.6 KG/M2 | SYSTOLIC BLOOD PRESSURE: 116 MMHG | DIASTOLIC BLOOD PRESSURE: 70 MMHG | HEIGHT: 69 IN

## 2025-01-16 DIAGNOSIS — S62.111D CLOSED CHIP FRACTURE OF RIGHT TRIQUETRUM WITH ROUTINE HEALING, SUBSEQUENT ENCOUNTER: Primary | ICD-10-CM

## 2025-01-16 DIAGNOSIS — S62.114A CLOSED NONDISPLACED FRACTURE OF TRIQUETRUM OF RIGHT WRIST, INITIAL ENCOUNTER: ICD-10-CM

## 2025-01-16 PROBLEM — S62.111A CLOSED CHIP FRACTURE OF TRIQUETRAL BONE OF RIGHT WRIST: Status: ACTIVE | Noted: 2025-01-16

## 2025-01-16 RX ORDER — TRAMADOL HYDROCHLORIDE 50 MG/1
50 TABLET ORAL EVERY 8 HOURS PRN
Qty: 12 TABLET | Refills: 0 | Status: SHIPPED | OUTPATIENT
Start: 2025-01-16

## 2025-01-16 RX ORDER — OXYCODONE AND ACETAMINOPHEN 7.5; 325 MG/1; MG/1
1 TABLET ORAL EVERY 6 HOURS PRN
Qty: 12 TABLET | Refills: 0 | OUTPATIENT
Start: 2025-01-16

## 2025-01-16 NOTE — TELEPHONE ENCOUNTER
"    Caller: Stepan Gaines \"Tomas\"    Relationship: Self    Best call back number: [phone]     Requested Prescriptions:   Requested Prescriptions     Pending Prescriptions Disp Refills    oxyCODONE-acetaminophen (PERCOCET) 7.5-325 MG per tablet 12 tablet 0     Sig: Take 1 tablet by mouth Every 6 (Six) Hours As Needed for Severe Pain.      PATIENT FELL ON ICE AND BROKE HIS WRIST. HE WENT TO THE SURGEON TODAY AND THEY WOULD NOT GIVE HIM ANYTHING OTHER THAN TRAMADOL. HE CAN'T SLEEP BECAUSE HE CAN'T GET COMFORTABLE AND IF HE DOES WITH HIS WRIST HIS BACK WILL HURT.HE WAS GIVEN A FEW OF THE ABOVE AT THE HOSPITAL. PATIENT IS WANTING TO SEE IF PCP WOULD PRESCRIBE JUST A FEW EVEN IF ITS JUST ANOTHER 12  CALL PATIENT TO DISCUSS      Pharmacy where request should be sent: [facility] [id number] [facility] [address] - [phone]  - [phone] FX     Last office visit with prescribing clinician: 1/3/2025   Last telemedicine visit with prescribing clinician: Visit date not found   Next office visit with prescribing clinician: Visit date not found         Does the patient have less than a 3 day supply:  [x] Yes  [] No    Would you like a call back once the refill request has been completed: [x] Yes [] No    If the office needs to give you a call back, can they leave a voicemail: [x] Yes [] No    Paula Luque Rep   01/16/25 12:32 EST         "

## 2025-01-16 NOTE — PROGRESS NOTES
[facility]     Office Visit       Date: 01/16/2025   Patient Name: Stepan Gaines  MRN: [id number]  YOB: 1955    Referring Physician: No ref. provider found     Chief Complaint:   Chief Complaint   Patient presents with    Right Wrist - Pain     History of Present Illness:   Stepan Gaines is a 69 y.o. male right-hand-dominant presented clinic for new patient complains of right wrist pain.  The patient sustained a fall from stand landing onto his right wrist, DOI 1/9/2025.  He presented to the emergency department where x-rays were obtained demonstrating a dorsal triquetral avulsion fracture.  He was placed in a splint and instructed to follow-up.  He reports his pain is 7/10.  He has been taking Percocet 7.5 mg. He also reports low back pain.  No other complaints or concerns.    PMH: HTN, SVT, a flutter, COPD    Subjective   Review of Systems:   Review of Systems   Constitutional: Negative.  Negative for chills, fatigue and fever.   HENT: Negative.  Negative for congestion and dental problem.    Eyes: Negative.  Negative for blurred vision.   Respiratory: Negative.  Negative for shortness of breath.    Cardiovascular: Negative.  Negative for leg swelling.   Gastrointestinal: Negative.  Negative for abdominal pain.   Endocrine: Negative.  Negative for polyuria.   Genitourinary: Negative.  Negative for difficulty urinating.   Musculoskeletal:  Positive for arthralgias.   Skin: Negative.    Allergic/Immunologic: Negative.    Neurological: Negative.    Hematological: Negative.  Negative for adenopathy.   Psychiatric/Behavioral: Negative.  Negative for behavioral problems.       Pertinent review of systems per HPI    I reviewed the patient's chief complaint, history of present illness, review of systems, past medical history, surgical history, family history, social history, medications and allergy list in the EMR on  "01/16/2025 and agree with the findings above.    Objective    Vital Signs:   Vitals:    01/16/25 1049   BP: 116/70   Weight: 69.2 kg (152 lb 8.9 oz)   Height: 175.3 cm (69\")     General: No acute distress. Alert and oriented.   Cardiovascular: Palpable radial pulse.   Respiratory: Breathing is nonlabored.   Ortho Exam:  Examination of the right upper extremity demonstrates no deformity.  No skin lesions or abrasions.  No significant swelling to the dorsum of the hand or wrist.  He is point tender dorsally over the carpus, worse along the triquetrum.  Nontender at the distal radius and radiocarpal joint.  Painful wrist range of motion.  He is able to make composite fist.  Sensations intact light touch of the hand.  Warm well-perfused distally.    Imaging / Studies:    Imaging Results (Last 24 Hours)       ** No results found for the last 24 hours. **        Right hand and wrist x-rays obtained on 1/10/2025 were personally reviewed interpreted by myself.  These demonstrate a dorsal triquetral avulsion fracture.  Diffuse degenerative changes noted throughout the PIP and DIP joints.  Mild thumb MCP and CMC joint arthritis.  No other acute findings.    Assessment / Plan    Assessment/Plan:   Stepan Gaines is a 69 y.o. male with right wrist dorsal triquetral avulsion fracture, DOI 1/9/2024.    I discussed with the patient their clinical and radiographic findings demonstrate a dorsal triquetral avulsion fracture.  We had a lengthy discussion regarding the pathophysiology of their diagnosis.  The patient's fracture represents underlying dorsal capsular injury.  I discussed that these injuries are treated nonoperatively with immobilization for 3 weeks, anti-inflammatories, and ice.  He was provided a removable wrist brace today to be worn at all times like a cast, removing only for hygiene.  I recommended a Medrol Dosepak today, however the patient was requesting something stronger.  I am unable to provide him with the " Percocet 7.5-325mg as he requested, but I did provide him with a short course of tramadol 50mg 12 tabs.  I discussed the importance of taking an anti-inflammatory to help with his pain and weaning off of narcotic use.  I will see him back in 3 weeks for reevaluation and discontinuation of the wrist brace and likely initiation of therapy.  They were agreeable with the plan.  All questions and concerns were addressed.       ICD-10-CM ICD-9-CM   1. Closed chip fracture of right triquetrum with routine healing, subsequent encounter  S62.111D V54.12     Follow Up:   Return in about 3 weeks (around 2/6/2025) for Follow Up.      Katey Martinez MD  [facility]

## 2025-01-16 NOTE — ED PROVIDER NOTES
Subjective   History of Present Illness  69-year-old male presents emergency department today after having a fall and hurting his right hand on the ice.  He is ambidextrous.  He denies any numbness or tingling no weakness expose the pain is over the fourth and fifth metacarpals.    History provided by:  Patient and significant other   used: No    Fall  Mechanism of injury: fall    Injury location:  Hand  Hand injury location:  R hand and R wrist  Incident location:  Outdoors  Time since incident:  1 day  Arrived directly from scene: no    Fall:     Fall occurred:  Walking    Impact surface:  Ice    Point of impact:  Hands    Entrapped after fall: no    Protective equipment: boots    Suspicion of alcohol use: no    Suspicion of drug use: no    Tetanus status:  Up to date  Associated symptoms: no abdominal pain, no blindness, no chest pain, no headaches, no hearing loss, no neck pain, no seizures and no vomiting    Risk factors: no anticoagulation therapy, no beta blocker therapy, no CABG, no CAD, no COPD, no diabetes, no dialysis, no hemophilia, no pacemaker, no past MI and not pregnant        Review of Systems   HENT:  Negative for hearing loss.    Eyes:  Negative for blindness.   Respiratory:  Negative for chest tightness, shortness of breath and wheezing.    Cardiovascular:  Negative for chest pain.   Gastrointestinal:  Negative for abdominal pain and vomiting.   Genitourinary:  Negative for dysuria, frequency and urgency.   Musculoskeletal:  Negative for neck pain.   Neurological:  Negative for seizures and headaches.       Past Medical History:   Diagnosis Date    A-fib     Anxiety     Arthritis     Cancer     melanoma back    Cataract     COPD (chronic obstructive pulmonary disease)     Fainting     Hypertension        Allergies   Allergen Reactions    Diclofenac GI Intolerance       Past Surgical History:   Procedure Laterality Date    CARDIAC ELECTROPHYSIOLOGY PROCEDURE N/A 6/10/2020     Procedure: EP/Ablation Atypical AFL w ICE & SJM or Carto mapping, ASAP, pt has no meds that need held;  Surgeon: Beto Solomon DO;  Location: [facility] EP INVASIVE LOCATION;  Service: Cardiology;  Laterality: N/A;    CATARACT EXTRACTION      right eye    ELBOW ARTHROSCOPY Left     left elbow twice    KNEE ARTHROSCOPY Right     SHOULDER ARTHROSCOPY Right     left and right but had left shoulder surgery twice    TOE ARTHROPLASTY Right        Family History   Problem Relation Age of Onset    Hyperlipidemia Mother     Lung cancer Father     Diabetes Father     Kidney failure Father        Social History     Socioeconomic History    Marital status:    Tobacco Use    Smoking status: Every Day     Current packs/day: 0.50     Average packs/day: 0.5 packs/day for 42.0 years (21.0 ttl pk-yrs)     Types: Cigarettes     Start date: 1983     Passive exposure: Current    Smokeless tobacco: Never   Vaping Use    Vaping status: Some Days    Substances: Nicotine, THC, CBD    Devices: Pre-filled or refillable cartridge, Refillable tank   Substance and Sexual Activity    Alcohol use: Yes     Comment: occasional    Drug use: Yes     Frequency: 7.0 times per week     Types: Marijuana     Comment: daily    Sexual activity: Defer           Objective   Physical Exam  Vitals and nursing note reviewed.   Constitutional:       Appearance: He is well-developed.   HENT:      Head: Normocephalic and atraumatic.      Right Ear: External ear normal.      Left Ear: External ear normal.      Nose: Nose normal.   Eyes:      General: No scleral icterus.     Conjunctiva/sclera: Conjunctivae normal.      Pupils: Pupils are equal, round, and reactive to light.   Neck:      Thyroid: No thyromegaly.   Cardiovascular:      Rate and Rhythm: Regular rhythm.   Pulmonary:      Effort: Pulmonary effort is normal. No respiratory distress.      Breath sounds: Normal breath sounds.   Chest:      Chest wall: No tenderness.   Abdominal:      Tenderness: There  is no abdominal tenderness.   Musculoskeletal:      Cervical back: Normal range of motion.      Comments: Pain and tenderness over the dorsal aspect of the right hand.  There is mild edema no ecchymosis.  Cap refill less than 2 seconds full range of motion of all digits.  No tenderness in the snuffbox.  Radial and ulnar pulses are palpable.   Lymphadenopathy:      Cervical: No cervical adenopathy.   Skin:     General: Skin is warm and dry.   Neurological:      Mental Status: He is alert and oriented to person, place, and time.      Cranial Nerves: No cranial nerve deficit.      Coordination: Coordination normal.      Deep Tendon Reflexes: Reflexes are normal and symmetric. Reflexes normal.   Psychiatric:         Behavior: Behavior normal.         Thought Content: Thought content normal.         Judgment: Judgment normal.         Procedures           ED Course        No results found for this or any previous visit (from the past 24 hours).  Note: In addition to lab results from this visit, the labs listed above may include labs taken at another facility or during a different encounter within the last 24 hours. Please correlate lab times with ED admission and discharge times for further clarification of the services performed during this visit.    XR Hand 3+ View Right   Final Result   Impression:   1.Possible mildly displaced dorsal triquetral fracture. Recommend correlation with point tenderness.   2.Otherwise no evidence of acute fracture or dislocation.            Electronically Signed: Mil Torres MD     1/10/2025 1:25 PM EST     Workstation ID: [id number]      XR Wrist 3+ View Right   Final Result   Impression:   1.Possible mildly displaced dorsal triquetral fracture. Recommend correlation with point tenderness.   2.Otherwise no evidence of acute fracture or dislocation.            Electronically Signed: Mil Torres MD     1/10/2025 1:25 PM EST     Workstation ID: [id number]        Vitals:    01/10/25 1155  "01/10/25 1349   BP: 156/81 142/75   BP Location: Left arm    Patient Position: Sitting    Pulse: 60 60   Resp: 16 16   Temp: 97.7 °F (36.5 °C)    TempSrc: Oral    SpO2: 98% 98%   Weight: 69.2 kg (152 lb 7.2 oz)    Height: 175.3 cm (69\")      Medications   oxyCODONE-acetaminophen (PERCOCET) 7.5-325 MG per tablet 1 tablet (1 tablet Oral Given 1/10/25 1349)     ECG/EMG Results (last 24 hours)       ** No results found for the last 24 hours. **          No orders to display                                                      Medical Decision Making  Problems Addressed:  Closed nondisplaced fracture of triquetrum of right wrist, initial encounter: complicated acute illness or injury    Amount and/or Complexity of Data Reviewed  Radiology: ordered. Decision-making details documented in ED Course.    Risk  Prescription drug management.        Final diagnoses:   Closed nondisplaced fracture of triquetrum of right wrist, initial encounter       ED Disposition  ED Disposition       ED Disposition   Discharge    Condition   Stable    Comment   --               Katey Martinez MD  [address]  [place] [address]  [phone]      Call for appointment         Medication List        New Prescriptions      naloxone 4 MG/0.1ML nasal spray  Commonly known as: NARCAN  Call 911. Don't prime. [place] in 1 nostril for overdose. Repeat in 2-3 minutes in other nostril if no or minimal breathing/responsiveness.     oxyCODONE-acetaminophen 7.5-325 MG per tablet  Commonly known as: PERCOCET  Take 1 tablet by mouth Every 6 (Six) Hours As Needed for Severe Pain.               Where to Get Your Medications        These medications were sent to [facility] [id number] - [place], [place] - [address] - [phone]  - [phone]  [address] [facility] [address], [facility] [address]      Phone: [phone]   naloxone 4 MG/0.1ML nasal spray  oxyCODONE-acetaminophen 7.5-325 MG per tablet            Tanvir Rios PA  01/16/25 " [id number]

## 2025-01-16 NOTE — TELEPHONE ENCOUNTER
Refill not appropriate per Dr. Cali he cannot prescribe the patient oxycodone, he has already been seen by ortho and was prescribed Tramadol. He will need to take the medication prescribed by ortho       He should be casted or immobilized in a splint and seeing Ortho in follow-up.  He should not be on opioid pain medicines for fracture that occurred 4 days ago.  The answer is no opioids from me.       Shivam Cali MD

## 2025-01-16 NOTE — ED PROVIDER NOTES
Subjective   History of Present Illness    Review of Systems    Past Medical History:   Diagnosis Date    A-fib     Anxiety     Arthritis     Cancer     melanoma back    Cataract     COPD (chronic obstructive pulmonary disease)     Fainting     Hypertension        Allergies   Allergen Reactions    Diclofenac GI Intolerance       Past Surgical History:   Procedure Laterality Date    CARDIAC ELECTROPHYSIOLOGY PROCEDURE N/A 6/10/2020    Procedure: EP/Ablation Atypical AFL w ICE & SJM or Carto mapping, ASAP, pt has no meds that need held;  Surgeon: Beto Solomon DO;  Location: [facility] EP INVASIVE LOCATION;  Service: Cardiology;  Laterality: N/A;    CATARACT EXTRACTION      right eye    ELBOW ARTHROSCOPY Left     left elbow twice    KNEE ARTHROSCOPY Right     SHOULDER ARTHROSCOPY Right     left and right but had left shoulder surgery twice    TOE ARTHROPLASTY Right        Family History   Problem Relation Age of Onset    Hyperlipidemia Mother     Lung cancer Father     Diabetes Father     Kidney failure Father        Social History     Socioeconomic History    Marital status:    Tobacco Use    Smoking status: Every Day     Current packs/day: 0.50     Average packs/day: 0.5 packs/day for 42.0 years (21.0 ttl pk-yrs)     Types: Cigarettes     Start date: 1983     Passive exposure: Current    Smokeless tobacco: Never   Vaping Use    Vaping status: Some Days    Substances: Nicotine, THC, CBD    Devices: Pre-filled or refillable cartridge, Refillable tank   Substance and Sexual Activity    Alcohol use: Yes     Comment: occasional    Drug use: Yes     Frequency: 7.0 times per week     Types: Marijuana     Comment: daily    Sexual activity: Defer           Objective   Physical Exam    Procedures           ED Course                                                       Medical Decision Making      Final diagnoses:   Closed nondisplaced fracture of triquetrum of right wrist, initial encounter       ED Disposition  ED  Disposition       ED Disposition   Discharge    Condition   Stable    Comment   --               Katey Martinez MD  [address]  [place] [id number] [phone]      Call for appointment         Medication List        New Prescriptions      naloxone 4 MG/0.1ML nasal spray  Commonly known as: NARCAN  Call 911. Don't prime. [employer] in 1 nostril for overdose. Repeat in 2-3 minutes in other nostril if no or minimal breathing/responsiveness.     oxyCODONE-acetaminophen 7.5-325 MG per tablet  Commonly known as: PERCOCET  Take 1 tablet by mouth Every 6 (Six) Hours As Needed for Severe Pain.               Where to Get Your Medications        These medications were sent to [facility] [id number] - [place], [place] - [address] [facility] - [phone]  - [phone]   [address] [facility] [address]      Phone: [phone]   naloxone 4 MG/0.1ML nasal spray  oxyCODONE-acetaminophen 7.5-325 MG per tablet

## 2025-01-22 ENCOUNTER — OFFICE VISIT (OUTPATIENT)
Dept: NEUROLOGY | Facility: CLINIC | Age: 70
End: 2025-01-22
Payer: MEDICARE

## 2025-01-22 VITALS
WEIGHT: 152 LBS | OXYGEN SATURATION: 95 % | BODY MASS INDEX: 22.51 KG/M2 | HEIGHT: 69 IN | HEART RATE: 72 BPM | DIASTOLIC BLOOD PRESSURE: 72 MMHG | SYSTOLIC BLOOD PRESSURE: 108 MMHG

## 2025-01-22 DIAGNOSIS — G40.909 NONINTRACTABLE EPILEPSY WITHOUT STATUS EPILEPTICUS, UNSPECIFIED EPILEPSY TYPE: Primary | ICD-10-CM

## 2025-01-22 DIAGNOSIS — R20.2 PARESTHESIAS: ICD-10-CM

## 2025-01-22 PROCEDURE — 1160F RVW MEDS BY RX/DR IN RCRD: CPT | Performed by: PSYCHIATRY & NEUROLOGY

## 2025-01-22 PROCEDURE — 1159F MED LIST DOCD IN RCRD: CPT | Performed by: PSYCHIATRY & NEUROLOGY

## 2025-01-22 PROCEDURE — 99214 OFFICE O/P EST MOD 30 MIN: CPT | Performed by: PSYCHIATRY & NEUROLOGY
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
## 2025-01-23 ENCOUNTER — TELEPHONE (OUTPATIENT)
Dept: FAMILY MEDICINE CLINIC | Facility: CLINIC | Age: 70
End: 2025-01-23

## 2025-01-23 NOTE — TELEPHONE ENCOUNTER
"  Caller: Stepan Gaines \"Tomas\"    Relationship: Self    Best call back number: [phone]     What form or medical record are you requesting: A LETTER STATING THE PATIENT HAS SEIZURES AND ARTHRITIS AND QUALIFIES FOR MEDICAL MARIJUANA.     Who is requesting this form or medical record from you: THE PATIENT     How would you like to receive the form or medical records (pick-up, mail, fax): MAIL   PLEASE MAIL TO:  [address] [place] [address]      Timeframe paperwork needed: SOON PLEASE     Additional notes: THE PATIENT REPORTS HE HAS DISCUSSED THIS ISSUE WITH DR KEYES IN THE PAST AND WAS TOLD BY DR KEYES THAT HE IS NOT LICENSED TO WRITE A PRESCRIPTION FOR MEDICAL MARIAJUANA. HOWEVER, THE PATIENT WAS STATED A LETTER IS REQUIRED FROM HIS PCP TO GET THE MEDICAL MARIJUANA, BASED ON HIS MEDICAL QUALIFICATIONS.    PLEASE CALL AND ADVISE.     "

## 2025-01-23 NOTE — TELEPHONE ENCOUNTER
"      Hub staff attempted to follow warm transfer process and was unsuccessful     Caller: Stepan Gaines \"Tomas\"    Relationship to patient: Self    Best call back number: [phone]     Patient is needing: THE PATIENT REQUESTED A HOSPITAL FOLLOW-UP FROM GOING TO THE [facility] ON 01/10/2025. THIS TIMEFRAME IS OUTSIDE OF THE ALLOWED 7 DAY WINDOW FOR [facility] TO SCHEDULE FOR THE PATIENT. [facility] ATTEMPTED TO WARM TRANSFER, BUT WAS UNSUCCESSFUL.    PLEASE CALL THE PATIENT TO SCHEDULE.    "

## 2025-01-27 ENCOUNTER — OFFICE VISIT (OUTPATIENT)
Dept: FAMILY MEDICINE CLINIC | Facility: CLINIC | Age: 70
End: 2025-01-27
Payer: MEDICARE

## 2025-01-27 VITALS
HEART RATE: 70 BPM | SYSTOLIC BLOOD PRESSURE: 132 MMHG | BODY MASS INDEX: 22.96 KG/M2 | WEIGHT: 155 LBS | OXYGEN SATURATION: 98 % | HEIGHT: 69 IN | TEMPERATURE: 98.4 F | DIASTOLIC BLOOD PRESSURE: 76 MMHG

## 2025-01-27 DIAGNOSIS — M47.816 LUMBAR SPONDYLOSIS: Primary | ICD-10-CM

## 2025-01-27 DIAGNOSIS — S62.114A CLOSED NONDISPLACED FRACTURE OF TRIQUETRUM OF RIGHT WRIST, INITIAL ENCOUNTER: ICD-10-CM

## 2025-01-27 RX ORDER — OXYCODONE AND ACETAMINOPHEN 7.5; 325 MG/1; MG/1
1 TABLET ORAL EVERY 6 HOURS PRN
Qty: 20 TABLET | Refills: 0 | Status: SHIPPED | OUTPATIENT
Start: 2025-01-27

## 2025-01-27 RX ORDER — PREDNISONE 10 MG/1
TABLET ORAL
Qty: 30 TABLET | Refills: 0 | Status: SHIPPED | OUTPATIENT
Start: 2025-01-27

## 2025-02-13 DIAGNOSIS — G47.00 INSOMNIA, UNSPECIFIED TYPE: ICD-10-CM

## 2025-02-13 DIAGNOSIS — F41.9 ANXIETY: ICD-10-CM

## 2025-02-13 RX ORDER — ALPRAZOLAM 1 MG/1
1 TABLET ORAL NIGHTLY PRN
Qty: 30 TABLET | Refills: 2 | Status: SHIPPED | OUTPATIENT
Start: 2025-02-13

## 2025-02-14 ENCOUNTER — TELEPHONE (OUTPATIENT)
Dept: FAMILY MEDICINE CLINIC | Facility: CLINIC | Age: 70
End: 2025-02-14

## 2025-02-14 NOTE — TELEPHONE ENCOUNTER
I have been very liberal with him and pain medications and gave him tramadol with refills and then due to his injury and ER visit I gave him 20 Percocet 7.5 mg. If he is requiring more pain medication than that we can refer him to a pain treatment center.

## 2025-02-14 NOTE — TELEPHONE ENCOUNTER
"Caller: Stepan Gaines \"Tomas\"    Relationship to patient: Self    Best call back number: [phone]     PATIENT IS CALLING TO STATE THAT HE HAS NECK, SHOULDER AN ARM PAIN.  HE HAS BEEN TREATED FOR BACK PAIN.  DR KEYES PRESCRIBED PAIN MEDICATION FOR HIM IN THE PAST AND WANTED TO SEE IF HE WOULD DO THAT AGAIN .  "

## 2025-02-24 ENCOUNTER — TELEPHONE (OUTPATIENT)
Dept: FAMILY MEDICINE CLINIC | Facility: CLINIC | Age: 70
End: 2025-02-24
Payer: MEDICARE

## 2025-02-24 DIAGNOSIS — M47.812 CERVICAL SPONDYLOSIS: ICD-10-CM

## 2025-02-24 DIAGNOSIS — M54.16 RADICULOPATHY, LUMBAR REGION: Primary | ICD-10-CM

## 2025-02-24 DIAGNOSIS — G89.29 CHRONIC PAIN OF BOTH SHOULDERS: ICD-10-CM

## 2025-02-24 DIAGNOSIS — M25.512 CHRONIC PAIN OF BOTH SHOULDERS: ICD-10-CM

## 2025-02-24 DIAGNOSIS — M25.511 CHRONIC PAIN OF BOTH SHOULDERS: ICD-10-CM

## 2025-02-24 NOTE — TELEPHONE ENCOUNTER
"  Caller: Stepan Gaines \"Tomas\"    Relationship: Self    Best call back number: [phone]     What is the medical concern/diagnosis: BACK PAIN    What specialty or service is being requested: PAIN CLINIC    What is the provider, practice or medical service name: RECOMMENDED BY DR KEYES    Additional Notes:  HE HAS ALSO HAD SHOULDER SURGERY AND HAS BEEN HAVING PAIN.    "

## 2025-03-04 ENCOUNTER — TELEPHONE (OUTPATIENT)
Dept: FAMILY MEDICINE CLINIC | Facility: CLINIC | Age: 70
End: 2025-03-04

## 2025-03-04 NOTE — TELEPHONE ENCOUNTER
"Caller: Stepan Gaines \"Tomas\"    Relationship to patient: Self      Best call back number:   Telephone Information:   Mobile [phone]     Provider: DR COLON    Medication PA needed: LIDOCAINE PATCHES    Reason for call/Prior Auth: INSURANCE WILL NOT COVER    "

## 2025-03-11 NOTE — PROGRESS NOTES
Subjective   Stepan Gaines is a 69 y.o. male    Chief Complaint    Right wrist injury    History of Present Illness  History of Present Illness  The patient is a 69-year-old male who presents today to follow up on a wrist injury. He is accompanied by his female .    He sustained a fracture in one of the carpal bones in his right wrist following a fall. He has been under the care of Dr. Martinez, a hand orthopedist, located on the first floor of this building. He reports persistent soreness at the fracture site and finds it challenging to move his wrist up and down. Despite attempts to alleviate discomfort with padding, he has not found relief. He recounts the incident leading to the fall, where he slipped on ice while walking back from [facility], resulting in the wrist injury. He also mentions a recent cortisone injection and expresses interest in a 15-day course of medication. He finds tramadol effective for daily use but prefers Percocet 7.5 for more severe pain episodes.    He was diagnosed with neuropathy and carpal tunnel syndrome in both hands by Dr. Parekh on Wednesday. He has been experiencing difficulty performing tasks with his hand since the fracture, even though he is not ambidextrous. He reports instances of objects dropping from his hands without his awareness until they hit the ground.    He also reports a knot in his neck that causes significant pain. He finds relief from neck pain with tizanidine and from back pain with Robaxin. His tizanidine prescription was recently reduced from 60 to 30 tablets per month. He can tolerate only a limited amount of ibuprofen and can not take diclofenac due to gastrointestinal side effects. He also reports arthritis.    Supplemental Information  He has undergone two shoulder surgeries in the past. He reports leg issues, which he attributes to his back condition, and describes a sensation of numbness from the knee down in both legs.    ALLERGIES  The patient  can not take DICLOFENAC as it makes him sick to his stomach.    MEDICATIONS  Current: tizanidine, Robaxin, ibuprofen, tramadol, Percocet      The following portions of the patient's history were reviewed and updated as appropriate: allergies, current medications, past social history and problem list    Review of Systems   Constitutional: Negative.    Respiratory: Negative.     Cardiovascular: Negative.    Gastrointestinal: Negative.    Musculoskeletal:  Positive for arthralgias, back pain and myalgias. Negative for gait problem.   Neurological:  Negative for dizziness, tremors, weakness and numbness.   Psychiatric/Behavioral:  Negative for behavioral problems and dysphoric mood. The patient is not nervous/anxious.      Objective     Vitals:    01/27/25 1032   BP: 132/76   Pulse: 70   Temp: 98.4 °F (36.9 °C)   SpO2: 98%       Physical Exam  Vitals and nursing note reviewed.   Constitutional:       Appearance: Normal appearance. He is well-developed.   HENT:      Head: Normocephalic and atraumatic.   Eyes:      General: No scleral icterus.     Conjunctiva/sclera: Conjunctivae normal.      Pupils: Pupils are equal, round, and reactive to light.   Cardiovascular:      Rate and Rhythm: Normal rate and regular rhythm.   Pulmonary:      Effort: Pulmonary effort is normal.      Breath sounds: Normal breath sounds.   Abdominal:      General: Bowel sounds are normal.      Palpations: Abdomen is soft.   Musculoskeletal:      Right wrist: Tenderness present. Decreased range of motion.      Lumbar back: Tenderness and bony tenderness present. No swelling, deformity or spasms. Decreased range of motion.   Neurological:      Mental Status: He is alert and oriented to person, place, and time.      Deep Tendon Reflexes: Reflexes are normal and symmetric.   Psychiatric:         Mood and Affect: Mood normal.         Behavior: Behavior normal.   Physical Exam      Assessment & Plan   Assessment & Plan  1. Fracture of the right carpal  bone.  The fracture is expected to heal without complications. A prescription for prednisone has been provided, with a dosage regimen of 3 tablets daily for 5 days, followed by 2 tablets daily for the subsequent 5 days, and finally 1 tablet daily for the last 5 days. Additionally, a prescription for Percocet 7.5 mg has been issued for pain management.    2. Bilateral carpal tunnel syndrome.  The patient reports difficulty performing tasks and dropping objects unknowingly.    3. Neuropathy.  The patient has been diagnosed with neuropathy in both hands.    4. Arthritis.  The patient reports that NSAIDs bother his stomach.    Problems Addressed this Visit    None  Visit Diagnoses         Lumbar spondylosis    -  Primary    Relevant Medications    oxyCODONE-acetaminophen (Percocet) 7.5-325 MG per tablet          Diagnoses         Codes Comments      Lumbar spondylosis    -  Primary ICD-10-CM: M47.816  ICD-9-CM: 721.3

## 2025-03-17 ENCOUNTER — TELEPHONE (OUTPATIENT)
Dept: FAMILY MEDICINE CLINIC | Facility: CLINIC | Age: 70
End: 2025-03-17

## 2025-03-17 RX ORDER — METOPROLOL TARTRATE 50 MG
50 TABLET ORAL 2 TIMES DAILY
Qty: 180 TABLET | Refills: 1 | Status: SHIPPED | OUTPATIENT
Start: 2025-03-17

## 2025-03-17 NOTE — TELEPHONE ENCOUNTER
"    Caller: Stepan Gaines \"Tomas\"    Relationship: Self    Best call back number: [phone]    What medication are you requesting: PAIN MEDICATION THAT IS STRONGER THAN TRAMADOL    What are your current symptoms: PAIN FROM NECK TO BACK TO LEGS         Have you had these symptoms before:    [x] Yes  [] No    Have you been treated for these symptoms before:   [x] Yes  [] No    If a prescription is needed, what is your preferred pharmacy and phone number: [facility] [id number] [place], [place] - [address] [facility] - [phone]  - [phone]      Additional notes: THE PATIENT STATES THAT HE NEEDS MEDICATION TO LAST UNTIL HE CAN GET AN APPOINTMENT WITH THE PAIN DOCTOR THE PATIENT WILL CALL THE PAIN DOCTOR TO MAKE AN APPOINTMENT WITH THEM AND THEN CALL THE OFFICE BACK WITH THAT DATE AND TIME THE PATIENT STATES THAT HE IS IN A LOT OF PAIN AND HE NEEDS MEDICATION         "

## 2025-03-19 ENCOUNTER — OFFICE VISIT (OUTPATIENT)
Dept: FAMILY MEDICINE CLINIC | Facility: CLINIC | Age: 70
End: 2025-03-19
Payer: MEDICARE

## 2025-03-19 VITALS
DIASTOLIC BLOOD PRESSURE: 86 MMHG | BODY MASS INDEX: 23.25 KG/M2 | HEART RATE: 68 BPM | RESPIRATION RATE: 18 BRPM | TEMPERATURE: 98.6 F | HEIGHT: 69 IN | SYSTOLIC BLOOD PRESSURE: 136 MMHG | WEIGHT: 157 LBS | OXYGEN SATURATION: 97 %

## 2025-03-19 DIAGNOSIS — M47.812 CERVICAL SPONDYLOSIS: Primary | ICD-10-CM

## 2025-03-19 DIAGNOSIS — G89.29 CHRONIC LEFT SHOULDER PAIN: ICD-10-CM

## 2025-03-19 DIAGNOSIS — M47.816 LUMBAR SPONDYLOSIS: ICD-10-CM

## 2025-03-19 DIAGNOSIS — M25.512 CHRONIC LEFT SHOULDER PAIN: ICD-10-CM

## 2025-03-19 PROCEDURE — 99213 OFFICE O/P EST LOW 20 MIN: CPT | Performed by: FAMILY MEDICINE

## 2025-03-19 PROCEDURE — 1125F AMNT PAIN NOTED PAIN PRSNT: CPT | Performed by: FAMILY MEDICINE

## 2025-03-19 RX ORDER — OXYCODONE AND ACETAMINOPHEN 7.5; 325 MG/1; MG/1
1 TABLET ORAL EVERY 6 HOURS PRN
Qty: 20 TABLET | Refills: 0 | Status: SHIPPED | OUTPATIENT
Start: 2025-03-19

## 2025-03-20 NOTE — PROGRESS NOTES
Subjective   Stepan Gaines is a 69 y.o. male    Chief Complaint    Chronic back pain  Chronic neck pain  Chronic left shoulder pain.    Back Pain  Pertinent negatives include no numbness or weakness.     History of Present Illness  The patient is a 69-year-old male who presents today complaining of back pain. He has an appointment to see pain management on 03/27/2025. He will be seeing Dr. BINU Mcgarry on 03/27/2025 according to the note. He is accompanied by his friend.    He has an upcoming appointment with Dr. BINU Mcgarry next week, whom he has previously consulted for shoulder and neck issues post-surgery. He reports that tramadol and ibuprofen provide similar pain relief, but ibuprofen causes gastrointestinal upset while tramadol does not. However, he likens the efficacy of these medications to that of baby aspirin. He expresses a preference for Percocet 7.5, which he finds more effective. He recalls a previous prescription of 10 mg tablets from Dr. Mcgarry, which was subsequently discontinued by his psychiatrist. He expresses a strong aversion to surgical interventions and large needle procedures, citing a particularly painful experience with a hot needle in his neck. He also reports a popping sensation in his neck during movement.    Supplemental Information  He reports limited shoulder mobility, preventing him from reaching into his closet, and restricted hand rotation due to a past baseball injury.        The following portions of the patient's history were reviewed and updated as appropriate: allergies, current medications, past social history and problem list    Review of Systems   Constitutional: Negative.    Respiratory: Negative.     Gastrointestinal: Negative.    Musculoskeletal:  Positive for arthralgias, back pain and neck pain. Negative for gait problem and myalgias.   Neurological:  Negative for dizziness, tremors, weakness and numbness.   Psychiatric/Behavioral:  Negative for behavioral problems and  dysphoric mood. The patient is not nervous/anxious.        Objective     Vitals:    03/19/25 1151   BP: 136/86   Pulse: 68   Resp: 18   Temp: 98.6 °F (37 °C)   SpO2: 97%       Physical Exam  Vitals and nursing note reviewed.   Constitutional:       Appearance: Normal appearance. He is well-developed.   HENT:      Head: Normocephalic and atraumatic.   Eyes:      General: No scleral icterus.     Conjunctiva/sclera: Conjunctivae normal.      Pupils: Pupils are equal, round, and reactive to light.   Cardiovascular:      Rate and Rhythm: Normal rate and regular rhythm.   Pulmonary:      Effort: Pulmonary effort is normal.      Breath sounds: Normal breath sounds.   Abdominal:      General: Bowel sounds are normal.      Palpations: Abdomen is soft.   Musculoskeletal:      Left shoulder: Bony tenderness present. Decreased range of motion. Decreased strength.      Cervical back: Tenderness present. No edema, deformity, erythema or crepitus. Pain with movement present. Decreased range of motion.      Lumbar back: Tenderness and bony tenderness present. No swelling, deformity or spasms. Decreased range of motion.   Neurological:      Mental Status: He is alert and oriented to person, place, and time.      Deep Tendon Reflexes: Reflexes are normal and symmetric.   Psychiatric:         Mood and Affect: Mood normal.         Behavior: Behavior normal.       Physical Exam      Assessment & Plan   Assessment & Plan  1. Degenerative disc disease, lumbar region.  A referral to pain management has been initiated. A prescription for Percocet 7.5 mg, consisting of 20 tablets, has been provided to manage his pain until his appointment with Dr. BINU Mcgarry on March 27.    Problems Addressed this Visit    None  Visit Diagnoses         Lumbar spondylosis        Relevant Medications    oxyCODONE-acetaminophen (Percocet) 7.5-325 MG per tablet          Diagnoses         Codes Comments      Lumbar spondylosis     ICD-10-CM: M47.816  ICD-9-CM:  721.3           I spent 20 minutes in patient care: Reviewing records prior to the visit, examining the patient, entering orders and documentation    Part of this note may be an electronic transcription/translation of spoken language to printed text using the Dragon Dictation System.

## 2025-03-30 DIAGNOSIS — F41.9 ANXIETY: ICD-10-CM

## 2025-04-03 RX ORDER — ESCITALOPRAM OXALATE 10 MG/1
10 TABLET ORAL DAILY
Qty: 90 TABLET | Refills: 1 | Status: SHIPPED | OUTPATIENT
Start: 2025-04-03

## 2025-05-08 RX ORDER — TRAZODONE HYDROCHLORIDE 50 MG/1
TABLET ORAL
Qty: 180 TABLET | Refills: 1 | Status: SHIPPED | OUTPATIENT
Start: 2025-05-08

## 2025-05-16 DIAGNOSIS — G47.00 INSOMNIA, UNSPECIFIED TYPE: ICD-10-CM

## 2025-05-16 DIAGNOSIS — F41.9 ANXIETY: ICD-10-CM

## 2025-05-19 RX ORDER — ALPRAZOLAM 1 MG/1
1 TABLET ORAL NIGHTLY PRN
Qty: 30 TABLET | Refills: 2 | Status: SHIPPED | OUTPATIENT
Start: 2025-05-19

## 2025-05-29 RX ORDER — TRIAMTERENE AND HYDROCHLOROTHIAZIDE 37.5; 25 MG/1; MG/1
1 TABLET ORAL DAILY
Qty: 90 TABLET | Refills: 1 | OUTPATIENT
Start: 2025-05-29

## 2025-06-12 ENCOUNTER — OFFICE VISIT (OUTPATIENT)
Dept: FAMILY MEDICINE CLINIC | Facility: CLINIC | Age: 70
End: 2025-06-12
Payer: MEDICARE

## 2025-06-12 VITALS
BODY MASS INDEX: 22.69 KG/M2 | TEMPERATURE: 98.6 F | DIASTOLIC BLOOD PRESSURE: 78 MMHG | HEIGHT: 69 IN | WEIGHT: 153.2 LBS | SYSTOLIC BLOOD PRESSURE: 122 MMHG | OXYGEN SATURATION: 94 % | HEART RATE: 76 BPM

## 2025-06-12 DIAGNOSIS — M47.816 LUMBAR SPONDYLOSIS: Primary | ICD-10-CM

## 2025-06-12 DIAGNOSIS — J30.9 ALLERGIC RHINITIS, UNSPECIFIED SEASONALITY, UNSPECIFIED TRIGGER: ICD-10-CM

## 2025-06-12 PROCEDURE — 99213 OFFICE O/P EST LOW 20 MIN: CPT | Performed by: FAMILY MEDICINE

## 2025-06-12 PROCEDURE — 1159F MED LIST DOCD IN RCRD: CPT | Performed by: FAMILY MEDICINE

## 2025-06-12 PROCEDURE — 1160F RVW MEDS BY RX/DR IN RCRD: CPT | Performed by: FAMILY MEDICINE

## 2025-06-12 PROCEDURE — 1125F AMNT PAIN NOTED PAIN PRSNT: CPT | Performed by: FAMILY MEDICINE

## 2025-06-12 RX ORDER — BACLOFEN 10 MG/1
10 TABLET ORAL 3 TIMES DAILY
Qty: 60 TABLET | Refills: 3 | Status: SHIPPED | OUTPATIENT
Start: 2025-06-12

## 2025-06-18 ENCOUNTER — TELEPHONE (OUTPATIENT)
Dept: FAMILY MEDICINE CLINIC | Facility: CLINIC | Age: 70
End: 2025-06-18

## 2025-06-18 NOTE — TELEPHONE ENCOUNTER
"Caller: Stepan Gaines \"Tomas\"    Relationship: Self    Best call back number:       [phone] (Mobile)     What is the medical concern/diagnosis:     PAIN IN LEFT SIDE SHOULDER/NECK AREA    What specialty or service is being requested:     MRI OF AREA    What is the office location:     [facility]    Any additional details:     PATIENT REQUESTED A CALL BACK WITH CONFIRMATION OF DR KEYES'S APPROVAL FOR THE MRI AND/OR TO SCHEDULE APPOINTMENT WITH DR KEYES PRIOR TO SCHEDULING MRI  "

## 2025-06-25 DIAGNOSIS — M54.12 LEFT CERVICAL RADICULOPATHY: ICD-10-CM

## 2025-06-25 DIAGNOSIS — M47.812 CERVICAL SPONDYLOSIS: Primary | ICD-10-CM

## 2025-06-27 NOTE — PROGRESS NOTES
Subjective   Stepan Gaines is a 69 y.o. male    Chief Complaint    Chronic back pain    History of Present Illness  History of Present Illness  The patient is a 69-year-old male who presents today for back pain. He is now seeing pain management Dr. Latrell Mcgarry and is on opioid pain medication for his back. He is here today to discuss a muscle relaxer for his back, as tizanidine has not been effective.    He has recently initiated treatment with Dr. Mcgarry and has undergone his first epidural procedure, which provided relief from radiating leg pain. Despite the use of tizanidine, he continues to experience muscle spasms. He reports that Robaxin is more effective for his back than his neck, but he is currently experiencing more discomfort in the area between his neck and left shoulder. He maintains hydration by consuming Powerade and other enzyme-rich beverages. He has previously tried Flexeril, but it resulted in morning lethargy. His first muscle relaxer was Parafon Forte. He has also used Robaxin and tizanidine in the past.    He has a history of entropion, which has been exacerbated by cataract surgery and environmental factors such as air quality. This condition has also triggered his allergies. He is currently using antibiotic drops and erythromycin ointment for this issue. He has found Zyrtec to be beneficial in managing his symptoms, but he is cautious about medications that excessively dry his throat. The use of azelastine nasal spray has led to postnasal drainage, causing hoarseness during musical performances.    PAST SURGICAL HISTORY:  Right shoulder surgery  Cataract surgery      The following portions of the patient's history were reviewed and updated as appropriate: allergies, current medications, past social history and problem list    Review of Systems   Constitutional: Negative.    Respiratory: Negative.     Gastrointestinal: Negative.    Musculoskeletal:  Positive for back pain. Negative for  arthralgias, gait problem and myalgias.   Neurological:  Negative for dizziness, tremors, weakness and numbness.   Psychiatric/Behavioral:  Negative for behavioral problems and dysphoric mood. The patient is not nervous/anxious.      Objective     Vitals:    06/12/25 1504   BP: 122/78   Pulse: 76   Temp: 98.6 °F (37 °C)   SpO2: 94%       Physical Exam  Vitals and nursing note reviewed.   Constitutional:       Appearance: Normal appearance. He is well-developed.   HENT:      Head: Normocephalic and atraumatic.   Eyes:      General: No scleral icterus.     Conjunctiva/sclera: Conjunctivae normal.      Pupils: Pupils are equal, round, and reactive to light.   Cardiovascular:      Rate and Rhythm: Normal rate and regular rhythm.   Pulmonary:      Effort: Pulmonary effort is normal.      Breath sounds: Normal breath sounds.   Abdominal:      General: Bowel sounds are normal.      Palpations: Abdomen is soft.   Musculoskeletal:      Lumbar back: Tenderness and bony tenderness present. No swelling, deformity or spasms. Decreased range of motion.   Neurological:      Mental Status: He is alert and oriented to person, place, and time.      Deep Tendon Reflexes: Reflexes are normal and symmetric.   Psychiatric:         Mood and Affect: Mood normal.         Behavior: Behavior normal.   Physical Exam      Assessment & Plan   Assessment & Plan  1. Back pain.  - Reports that tizanidine and Robaxin have not been effective in managing muscle spasms.  - Experienced some relief from an epidural administered by Dr. Latrell Mcgarry.  - Baclofen will be prescribed as an alternative muscle relaxant.  - Advised to start with one tablet daily and adjust the dosage as needed, potentially breaking the tablets in half if the dosage is too strong. Prescription sent to pharmacy.    2. Allergic rhinitis.  - Reports that azelastine nasal spray causes unpleasant drainage down the throat, affecting performance as a musician.  - Zyrtec will be  recommended to manage symptoms without causing throat dryness.  - Discussed the potential interactions and effectiveness of oral antihistamines.  - Advised to monitor for throat dryness and consider rotating antihistamines if needed.    Problems Addressed this Visit    None  Diagnoses    None.

## 2025-07-09 ENCOUNTER — TELEPHONE (OUTPATIENT)
Dept: FAMILY MEDICINE CLINIC | Facility: CLINIC | Age: 70
End: 2025-07-09

## 2025-07-10 RX ORDER — METHYLPREDNISOLONE 4 MG/1
TABLET ORAL
Qty: 1 EACH | Refills: 0 | Status: SHIPPED | OUTPATIENT
Start: 2025-07-10

## 2025-07-28 ENCOUNTER — TELEPHONE (OUTPATIENT)
Dept: FAMILY MEDICINE CLINIC | Facility: CLINIC | Age: 70
End: 2025-07-28
Payer: MEDICARE

## 2025-07-28 DIAGNOSIS — M54.12 LEFT CERVICAL RADICULOPATHY: ICD-10-CM

## 2025-07-28 DIAGNOSIS — M47.812 CERVICAL SPONDYLOSIS: Primary | ICD-10-CM

## 2025-08-02 DIAGNOSIS — G40.909 NONINTRACTABLE EPILEPSY WITHOUT STATUS EPILEPTICUS, UNSPECIFIED EPILEPSY TYPE: ICD-10-CM

## 2025-08-04 RX ORDER — BRIVARACETAM 75 MG/1
75 TABLET, FILM COATED ORAL 2 TIMES DAILY
Qty: 180 TABLET | Refills: 0 | Status: SHIPPED | OUTPATIENT
Start: 2025-08-04 | End: 2025-08-06

## 2025-08-05 ENCOUNTER — LAB (OUTPATIENT)
Dept: FAMILY MEDICINE CLINIC | Facility: CLINIC | Age: 70
End: 2025-08-05
Payer: MEDICARE

## 2025-08-05 ENCOUNTER — OFFICE VISIT (OUTPATIENT)
Dept: FAMILY MEDICINE CLINIC | Facility: CLINIC | Age: 70
End: 2025-08-05
Payer: MEDICARE

## 2025-08-05 VITALS
RESPIRATION RATE: 16 BRPM | BODY MASS INDEX: 22.57 KG/M2 | TEMPERATURE: 98.5 F | SYSTOLIC BLOOD PRESSURE: 126 MMHG | HEART RATE: 55 BPM | DIASTOLIC BLOOD PRESSURE: 68 MMHG | OXYGEN SATURATION: 98 % | HEIGHT: 69 IN | WEIGHT: 152.4 LBS

## 2025-08-05 DIAGNOSIS — R56.9 SEIZURES: ICD-10-CM

## 2025-08-05 DIAGNOSIS — F41.1 GAD (GENERALIZED ANXIETY DISORDER): ICD-10-CM

## 2025-08-05 DIAGNOSIS — M47.816 LUMBAR SPONDYLOSIS: ICD-10-CM

## 2025-08-05 DIAGNOSIS — Z00.00 MEDICARE ANNUAL WELLNESS VISIT, SUBSEQUENT: Primary | ICD-10-CM

## 2025-08-05 DIAGNOSIS — Z12.5 PROSTATE CANCER SCREENING: ICD-10-CM

## 2025-08-05 DIAGNOSIS — G47.00 INSOMNIA, UNSPECIFIED TYPE: ICD-10-CM

## 2025-08-05 DIAGNOSIS — F41.9 ANXIETY: ICD-10-CM

## 2025-08-05 DIAGNOSIS — M47.812 CERVICAL SPONDYLOSIS: ICD-10-CM

## 2025-08-05 DIAGNOSIS — Z12.11 COLON CANCER SCREENING: ICD-10-CM

## 2025-08-05 DIAGNOSIS — L40.9 PSORIASIS OF SCALP: ICD-10-CM

## 2025-08-05 DIAGNOSIS — J30.9 ALLERGIC RHINITIS, UNSPECIFIED SEASONALITY, UNSPECIFIED TRIGGER: ICD-10-CM

## 2025-08-05 LAB
DEPRECATED RDW RBC AUTO: 44.1 FL (ref 37–54)
ERYTHROCYTE [DISTWIDTH] IN BLOOD BY AUTOMATED COUNT: 12.5 % (ref 12.3–15.4)
HCT VFR BLD AUTO: 41.7 % (ref 37.5–51)
HGB BLD-MCNC: 14.2 G/DL (ref 13–17.7)
MCH RBC QN AUTO: 33.6 PG (ref 26.6–33)
MCHC RBC AUTO-ENTMCNC: 34.1 G/DL (ref 31.5–35.7)
MCV RBC AUTO: 98.6 FL (ref 79–97)
PLATELET # BLD AUTO: 206 10*3/MM3 (ref 140–450)
PMV BLD AUTO: 11 FL (ref 6–12)
RBC # BLD AUTO: 4.23 10*6/MM3 (ref 4.14–5.8)
WBC NRBC COR # BLD AUTO: 8.18 10*3/MM3 (ref 3.4–10.8)

## 2025-08-05 PROCEDURE — 80061 LIPID PANEL: CPT | Performed by: FAMILY MEDICINE

## 2025-08-05 PROCEDURE — 80053 COMPREHEN METABOLIC PANEL: CPT | Performed by: FAMILY MEDICINE

## 2025-08-05 PROCEDURE — 85027 COMPLETE CBC AUTOMATED: CPT | Performed by: FAMILY MEDICINE

## 2025-08-05 PROCEDURE — G0103 PSA SCREENING: HCPCS | Performed by: FAMILY MEDICINE

## 2025-08-05 PROCEDURE — 84443 ASSAY THYROID STIM HORMONE: CPT | Performed by: FAMILY MEDICINE

## 2025-08-05 PROCEDURE — 84439 ASSAY OF FREE THYROXINE: CPT | Performed by: FAMILY MEDICINE

## 2025-08-05 PROCEDURE — 1160F RVW MEDS BY RX/DR IN RCRD: CPT | Performed by: FAMILY MEDICINE

## 2025-08-05 PROCEDURE — 1125F AMNT PAIN NOTED PAIN PRSNT: CPT | Performed by: FAMILY MEDICINE

## 2025-08-05 PROCEDURE — 1170F FXNL STATUS ASSESSED: CPT | Performed by: FAMILY MEDICINE

## 2025-08-05 PROCEDURE — 36415 COLL VENOUS BLD VENIPUNCTURE: CPT | Performed by: FAMILY MEDICINE

## 2025-08-05 PROCEDURE — G0439 PPPS, SUBSEQ VISIT: HCPCS | Performed by: FAMILY MEDICINE

## 2025-08-05 PROCEDURE — 1159F MED LIST DOCD IN RCRD: CPT | Performed by: FAMILY MEDICINE

## 2025-08-05 RX ORDER — TRAZODONE HYDROCHLORIDE 50 MG/1
TABLET ORAL
Qty: 180 TABLET | Refills: 3 | Status: SHIPPED | OUTPATIENT
Start: 2025-08-05

## 2025-08-05 RX ORDER — IBUPROFEN 800 MG/1
800 TABLET, FILM COATED ORAL EVERY 6 HOURS PRN
Qty: 120 TABLET | Refills: 5 | Status: SHIPPED | OUTPATIENT
Start: 2025-08-05

## 2025-08-05 RX ORDER — BACLOFEN 20 MG/1
20 TABLET ORAL 3 TIMES DAILY PRN
Qty: 60 TABLET | Refills: 2 | Status: SHIPPED | OUTPATIENT
Start: 2025-08-05

## 2025-08-05 RX ORDER — FLUOCINONIDE TOPICAL SOLUTION USP, 0.05% 0.5 MG/ML
SOLUTION TOPICAL 2 TIMES DAILY
Qty: 60 ML | Refills: 5 | Status: SHIPPED | OUTPATIENT
Start: 2025-08-05

## 2025-08-05 RX ORDER — TRIAMTERENE AND HYDROCHLOROTHIAZIDE 37.5; 25 MG/1; MG/1
1 TABLET ORAL DAILY
Qty: 90 TABLET | Refills: 3 | Status: SHIPPED | OUTPATIENT
Start: 2025-08-05

## 2025-08-05 RX ORDER — ALPRAZOLAM 1 MG/1
1 TABLET ORAL NIGHTLY PRN
Qty: 30 TABLET | Refills: 2 | Status: SHIPPED | OUTPATIENT
Start: 2025-08-05

## 2025-08-06 ENCOUNTER — OFFICE VISIT (OUTPATIENT)
Dept: NEUROLOGY | Facility: CLINIC | Age: 70
End: 2025-08-06
Payer: MEDICARE

## 2025-08-06 VITALS
WEIGHT: 152 LBS | DIASTOLIC BLOOD PRESSURE: 64 MMHG | OXYGEN SATURATION: 95 % | BODY MASS INDEX: 22.51 KG/M2 | HEART RATE: 87 BPM | HEIGHT: 69 IN | SYSTOLIC BLOOD PRESSURE: 102 MMHG

## 2025-08-06 DIAGNOSIS — G40.909 NONINTRACTABLE EPILEPSY WITHOUT STATUS EPILEPTICUS, UNSPECIFIED EPILEPSY TYPE: Primary | ICD-10-CM

## 2025-08-06 LAB
ALBUMIN SERPL-MCNC: 4.2 G/DL (ref 3.5–5.2)
ALBUMIN/GLOB SERPL: 1.4 G/DL
ALP SERPL-CCNC: 75 U/L (ref 39–117)
ALT SERPL W P-5'-P-CCNC: 25 U/L (ref 1–41)
ANION GAP SERPL CALCULATED.3IONS-SCNC: 12.5 MMOL/L (ref 5–15)
AST SERPL-CCNC: 33 U/L (ref 1–40)
BILIRUB SERPL-MCNC: 0.5 MG/DL (ref 0–1.2)
BUN SERPL-MCNC: 8 MG/DL (ref 8–23)
BUN/CREAT SERPL: 8.6 (ref 7–25)
CALCIUM SPEC-SCNC: 8.9 MG/DL (ref 8.6–10.5)
CHLORIDE SERPL-SCNC: 96 MMOL/L (ref 98–107)
CHOLEST SERPL-MCNC: 196 MG/DL (ref 0–200)
CO2 SERPL-SCNC: 24.5 MMOL/L (ref 22–29)
CREAT SERPL-MCNC: 0.93 MG/DL (ref 0.76–1.27)
EGFRCR SERPLBLD CKD-EPI 2021: 89.4 ML/MIN/1.73
GLOBULIN UR ELPH-MCNC: 3 GM/DL
GLUCOSE SERPL-MCNC: 101 MG/DL (ref 65–99)
HDLC SERPL-MCNC: 59 MG/DL (ref 40–60)
LDLC SERPL CALC-MCNC: 120 MG/DL (ref 0–100)
LDLC/HDLC SERPL: 2 {RATIO}
POTASSIUM SERPL-SCNC: 4.3 MMOL/L (ref 3.5–5.2)
PROT SERPL-MCNC: 7.2 G/DL (ref 6–8.5)
PSA SERPL-MCNC: 1.08 NG/ML (ref 0–4)
SODIUM SERPL-SCNC: 133 MMOL/L (ref 136–145)
T4 FREE SERPL-MCNC: 1.04 NG/DL (ref 0.92–1.68)
TRIGL SERPL-MCNC: 95 MG/DL (ref 0–150)
TSH SERPL DL<=0.05 MIU/L-ACNC: 0.41 UIU/ML (ref 0.27–4.2)
VLDLC SERPL-MCNC: 17 MG/DL (ref 5–40)

## 2025-08-06 PROCEDURE — 1159F MED LIST DOCD IN RCRD: CPT | Performed by: PSYCHIATRY & NEUROLOGY

## 2025-08-06 PROCEDURE — 99214 OFFICE O/P EST MOD 30 MIN: CPT | Performed by: PSYCHIATRY & NEUROLOGY

## 2025-08-06 PROCEDURE — 1160F RVW MEDS BY RX/DR IN RCRD: CPT | Performed by: PSYCHIATRY & NEUROLOGY

## 2025-08-07 RX ORDER — TOBRAMYCIN AND DEXAMETHASONE 3; 1 MG/ML; MG/ML
SUSPENSION/ DROPS OPHTHALMIC
Qty: 5 ML | Refills: 1 | Status: SHIPPED | OUTPATIENT
Start: 2025-08-07

## (undated) DEVICE — DECANT BG O JET

## (undated) DEVICE — LEX ELECTRO PHYSIOLOGY: Brand: MEDLINE INDUSTRIES, INC.

## (undated) DEVICE — CANN NASL CO2 DIVIDED A/

## (undated) DEVICE — DRSNG SURESITE123 4X4.8IN

## (undated) DEVICE — Device: Brand: WEBSTER CS

## (undated) DEVICE — ST EXT IV SMARTSITE 2VLV SP M LL 5ML IV1

## (undated) DEVICE — SKIN PREP TRAY W/CHG: Brand: MEDLINE INDUSTRIES, INC.

## (undated) DEVICE — LIMB HOLDER, WRIST/ANKLE: Brand: DEROYAL

## (undated) DEVICE — INTRO SHEATH ENGAGE W/50 GW .038 8F12

## (undated) DEVICE — Device: Brand: VIZIGO

## (undated) DEVICE — Device: Brand: SOUNDSTAR

## (undated) DEVICE — Device: Brand: SMARTABLATE

## (undated) DEVICE — Device: Brand: REFERENCE PATCH CARTO 3

## (undated) DEVICE — INTRO SHEATH FAST/CATH LG/LUM 11F .038IN 12CM

## (undated) DEVICE — ST INF PRI SMRTSTE 20DRP 2VLV 24ML 117

## (undated) DEVICE — ADULT, W/LG. BACK PAD, RADIOTRANSPARENT ELEMENT AND LEAD WIRE: Brand: DEFIBRILLATION ELECTRODES

## (undated) DEVICE — Device: Brand: THERMOCOOL SMARTTOUCH SF